# Patient Record
Sex: FEMALE | Race: WHITE | Employment: OTHER | URBAN - NONMETROPOLITAN AREA
[De-identification: names, ages, dates, MRNs, and addresses within clinical notes are randomized per-mention and may not be internally consistent; named-entity substitution may affect disease eponyms.]

---

## 2017-09-15 ENCOUNTER — HOSPITAL ENCOUNTER (INPATIENT)
Age: 77
LOS: 20 days | Discharge: HOSPICE | DRG: 441 | End: 2017-10-05
Attending: ANESTHESIOLOGY | Admitting: ANESTHESIOLOGY
Payer: MEDICARE

## 2017-09-15 ENCOUNTER — APPOINTMENT (OUTPATIENT)
Dept: GENERAL RADIOLOGY | Age: 77
DRG: 441 | End: 2017-09-15
Attending: ANESTHESIOLOGY
Payer: MEDICARE

## 2017-09-15 ENCOUNTER — APPOINTMENT (OUTPATIENT)
Dept: ULTRASOUND IMAGING | Age: 77
DRG: 441 | End: 2017-09-15
Attending: ANESTHESIOLOGY
Payer: MEDICARE

## 2017-09-15 DIAGNOSIS — E83.52 HYPERCALCEMIA: Primary | ICD-10-CM

## 2017-09-15 PROBLEM — E87.6 HYPOKALEMIA: Status: ACTIVE | Noted: 2017-09-15

## 2017-09-15 PROBLEM — D69.6 THROMBOCYTOPENIA (HCC): Status: ACTIVE | Noted: 2017-09-15

## 2017-09-15 PROBLEM — N82.1: Status: RESOLVED | Noted: 2017-09-15 | Resolved: 2017-09-15

## 2017-09-15 PROBLEM — D50.9 MICROCYTIC ANEMIA: Status: ACTIVE | Noted: 2017-09-15

## 2017-09-15 PROBLEM — J90 PLEURAL EFFUSION, RIGHT: Status: ACTIVE | Noted: 2017-09-15

## 2017-09-15 PROBLEM — E80.6 HYPERBILIRUBINEMIA: Status: ACTIVE | Noted: 2017-09-15

## 2017-09-15 PROBLEM — N82.1: Status: ACTIVE | Noted: 2017-09-15

## 2017-09-15 PROBLEM — K76.82 HEPATIC ENCEPHALOPATHY: Status: ACTIVE | Noted: 2017-09-15

## 2017-09-15 PROBLEM — I16.0 HYPERTENSIVE URGENCY: Status: ACTIVE | Noted: 2017-09-15

## 2017-09-15 LAB
ALBUMIN SERPL-MCNC: 2.2 G/DL (ref 3.5–5.1)
ALP BLD-CCNC: 197 U/L (ref 38–126)
ALT SERPL-CCNC: 15 U/L (ref 11–66)
AMMONIA: 143 UMOL/L (ref 11–60)
AMPHETAMINE+METHAMPHETAMINE URINE SCREEN: NEGATIVE
ANION GAP SERPL CALCULATED.3IONS-SCNC: 12 MEQ/L (ref 8–16)
ANISOCYTOSIS: ABNORMAL
AST SERPL-CCNC: 19 U/L (ref 5–40)
BACTERIA: ABNORMAL
BARBITURATE QUANTITATIVE URINE: NEGATIVE
BASOPHILIA: SLIGHT
BASOPHILS # BLD: 0 %
BASOPHILS ABSOLUTE: 0 THOU/MM3 (ref 0–0.1)
BENZODIAZEPINE QUANTITATIVE URINE: NEGATIVE
BILIRUB SERPL-MCNC: 2.5 MG/DL (ref 0.3–1.2)
BILIRUBIN DIRECT: 1.3 MG/DL (ref 0–0.3)
BILIRUBIN URINE: NEGATIVE
BLOOD, URINE: ABNORMAL
BUN BLDV-MCNC: 37 MG/DL (ref 7–22)
CALCIUM SERPL-MCNC: 10.3 MG/DL (ref 8.5–10.5)
CANNABINOID QUANTITATIVE URINE: NEGATIVE
CASTS: ABNORMAL /LPF
CASTS: ABNORMAL /LPF
CHARACTER, URINE: CLEAR
CHLORIDE BLD-SCNC: 105 MEQ/L (ref 98–111)
CO2: 28 MEQ/L (ref 23–33)
COCAINE METABOLITE QUANTITATIVE URINE: NEGATIVE
COLOR: YELLOW
CREAT SERPL-MCNC: 1.1 MG/DL (ref 0.4–1.2)
CRYSTALS: ABNORMAL
DIFFERENTIAL TYPE: ABNORMAL
EOSINOPHIL # BLD: 0 %
EOSINOPHILS ABSOLUTE: 0 THOU/MM3 (ref 0–0.4)
EPITHELIAL CELLS, UA: ABNORMAL /HPF
GFR SERPL CREATININE-BSD FRML MDRD: 48 ML/MIN/1.73M2
GLUCOSE BLD-MCNC: 165 MG/DL (ref 70–108)
GLUCOSE, URINE: NEGATIVE MG/DL
HCT VFR BLD CALC: 29 % (ref 37–47)
HEMOGLOBIN: 9.6 GM/DL (ref 12–16)
HYPOCHROMIA: ABNORMAL
INR BLD: 1.09 (ref 0.85–1.13)
INR BLD: 1.09 (ref 0.85–1.13)
KETONES, URINE: NEGATIVE
LACTIC ACID: 2.2 MMOL/L (ref 0.5–2.2)
LEUKOCYTE EST, POC: NEGATIVE
LV EF: 65 %
LVEF MODALITY: NORMAL
LYMPHOCYTES # BLD: 6 %
LYMPHOCYTES ABSOLUTE: 0.3 THOU/MM3 (ref 1–4.8)
MAGNESIUM: 1.8 MG/DL (ref 1.6–2.4)
MCH RBC QN AUTO: 25.8 PG (ref 27–31)
MCHC RBC AUTO-ENTMCNC: 33.1 GM/DL (ref 33–37)
MCV RBC AUTO: 77.9 FL (ref 81–99)
MICROCYTES: ABNORMAL
MISCELLANEOUS LAB TEST RESULT: ABNORMAL
MONOCYTES # BLD: 10.9 %
MONOCYTES ABSOLUTE: 0.5 THOU/MM3 (ref 0.4–1.3)
NITRITE, URINE: NEGATIVE
NUCLEATED RED BLOOD CELLS: 0 /100 WBC
OPIATES, URINE: NEGATIVE
OXYCODONE: NEGATIVE
PATHOLOGIST REVIEW: ABNORMAL
PDW BLD-RTO: 20.8 % (ref 11.5–14.5)
PH UA: 6.5
PHENCYCLIDINE QUANTITATIVE URINE: NEGATIVE
PHOSPHORUS: 2.4 MG/DL (ref 2.4–4.7)
PLATELET # BLD: 64 THOU/MM3 (ref 130–400)
PLATELET ESTIMATE: ABNORMAL
PMV BLD AUTO: 8.6 MCM (ref 7.4–10.4)
POIKILOCYTES: ABNORMAL
POTASSIUM SERPL-SCNC: 3.1 MEQ/L (ref 3.5–5.2)
PROTEIN UA: 100 MG/DL
RBC # BLD: 3.73 MILL/MM3 (ref 4.2–5.4)
RBC # BLD: ABNORMAL 10*6/UL
RBC URINE: ABNORMAL /HPF
RENAL EPITHELIAL, UA: ABNORMAL
SCAN OF BLOOD SMEAR: NORMAL
SEG NEUTROPHILS: 83.1 %
SEGMENTED NEUTROPHILS ABSOLUTE COUNT: 4.1 THOU/MM3 (ref 1.8–7.7)
SODIUM BLD-SCNC: 145 MEQ/L (ref 135–145)
SPECIFIC GRAVITY UA: 1.02 (ref 1–1.03)
TOTAL PROTEIN: 5.5 G/DL (ref 6.1–8)
UROBILINOGEN, URINE: 1 EU/DL
WBC # BLD: 4.9 THOU/MM3 (ref 4.8–10.8)
WBC UA: ABNORMAL /HPF
YEAST: ABNORMAL

## 2017-09-15 PROCEDURE — 76705 ECHO EXAM OF ABDOMEN: CPT

## 2017-09-15 PROCEDURE — 2580000003 HC RX 258: Performed by: ANESTHESIOLOGY

## 2017-09-15 PROCEDURE — 2500000003 HC RX 250 WO HCPCS: Performed by: ANESTHESIOLOGY

## 2017-09-15 PROCEDURE — 87086 URINE CULTURE/COLONY COUNT: CPT

## 2017-09-15 PROCEDURE — 99223 1ST HOSP IP/OBS HIGH 75: CPT | Performed by: ANESTHESIOLOGY

## 2017-09-15 PROCEDURE — 83605 ASSAY OF LACTIC ACID: CPT

## 2017-09-15 PROCEDURE — 2580000003 HC RX 258: Performed by: INTERNAL MEDICINE

## 2017-09-15 PROCEDURE — 83735 ASSAY OF MAGNESIUM: CPT

## 2017-09-15 PROCEDURE — 82140 ASSAY OF AMMONIA: CPT

## 2017-09-15 PROCEDURE — 99221 1ST HOSP IP/OBS SF/LOW 40: CPT | Performed by: NURSE PRACTITIONER

## 2017-09-15 PROCEDURE — 85025 COMPLETE CBC W/AUTO DIFF WBC: CPT

## 2017-09-15 PROCEDURE — 81001 URINALYSIS AUTO W/SCOPE: CPT

## 2017-09-15 PROCEDURE — 80307 DRUG TEST PRSMV CHEM ANLYZR: CPT

## 2017-09-15 PROCEDURE — 36415 COLL VENOUS BLD VENIPUNCTURE: CPT

## 2017-09-15 PROCEDURE — 71010 XR CHEST PORTABLE: CPT

## 2017-09-15 PROCEDURE — 6360000002 HC RX W HCPCS: Performed by: NURSE PRACTITIONER

## 2017-09-15 PROCEDURE — 85610 PROTHROMBIN TIME: CPT

## 2017-09-15 PROCEDURE — 2580000003 HC RX 258: Performed by: NURSE PRACTITIONER

## 2017-09-15 PROCEDURE — 51702 INSERT TEMP BLADDER CATH: CPT

## 2017-09-15 PROCEDURE — 87040 BLOOD CULTURE FOR BACTERIA: CPT

## 2017-09-15 PROCEDURE — 80053 COMPREHEN METABOLIC PANEL: CPT

## 2017-09-15 PROCEDURE — 93306 TTE W/DOPPLER COMPLETE: CPT

## 2017-09-15 PROCEDURE — 82248 BILIRUBIN DIRECT: CPT

## 2017-09-15 PROCEDURE — C9113 INJ PANTOPRAZOLE SODIUM, VIA: HCPCS | Performed by: NURSE PRACTITIONER

## 2017-09-15 PROCEDURE — 6370000000 HC RX 637 (ALT 250 FOR IP): Performed by: NURSE PRACTITIONER

## 2017-09-15 PROCEDURE — 6360000002 HC RX W HCPCS: Performed by: ANESTHESIOLOGY

## 2017-09-15 PROCEDURE — 2500000003 HC RX 250 WO HCPCS: Performed by: INTERNAL MEDICINE

## 2017-09-15 PROCEDURE — 84100 ASSAY OF PHOSPHORUS: CPT

## 2017-09-15 PROCEDURE — 2140000000 HC CCU INTERMEDIATE R&B

## 2017-09-15 PROCEDURE — 99233 SBSQ HOSP IP/OBS HIGH 50: CPT | Performed by: INTERNAL MEDICINE

## 2017-09-15 PROCEDURE — 6360000002 HC RX W HCPCS: Performed by: INTERNAL MEDICINE

## 2017-09-15 RX ORDER — POTASSIUM CHLORIDE 7.45 MG/ML
10 INJECTION INTRAVENOUS
Status: DISPENSED | OUTPATIENT
Start: 2017-09-15 | End: 2017-09-15

## 2017-09-15 RX ORDER — LABETALOL HYDROCHLORIDE 5 MG/ML
10 INJECTION, SOLUTION INTRAVENOUS EVERY 4 HOURS PRN
Status: DISCONTINUED | OUTPATIENT
Start: 2017-09-15 | End: 2017-10-05 | Stop reason: HOSPADM

## 2017-09-15 RX ORDER — OMEPRAZOLE 20 MG/1
40 CAPSULE, DELAYED RELEASE ORAL DAILY
Status: ON HOLD | COMMUNITY
End: 2017-10-05 | Stop reason: HOSPADM

## 2017-09-15 RX ORDER — LACTULOSE 10 G/15ML
20 SOLUTION ORAL 3 TIMES DAILY
Status: DISCONTINUED | OUTPATIENT
Start: 2017-09-15 | End: 2017-10-01

## 2017-09-15 RX ORDER — SODIUM CHLORIDE 0.9 % (FLUSH) 0.9 %
10 SYRINGE (ML) INJECTION PRN
Status: DISCONTINUED | OUTPATIENT
Start: 2017-09-15 | End: 2017-10-05 | Stop reason: HOSPADM

## 2017-09-15 RX ORDER — LEVOTHYROXINE SODIUM 112 UG/1
112 TABLET ORAL
Status: DISCONTINUED | OUTPATIENT
Start: 2017-09-15 | End: 2017-09-15

## 2017-09-15 RX ORDER — FUROSEMIDE 10 MG/ML
20 INJECTION INTRAMUSCULAR; INTRAVENOUS 2 TIMES DAILY
Status: DISCONTINUED | OUTPATIENT
Start: 2017-09-15 | End: 2017-09-15

## 2017-09-15 RX ORDER — LEVOTHYROXINE SODIUM 112 UG/1
112 TABLET ORAL DAILY
Status: ON HOLD | COMMUNITY
End: 2017-10-05 | Stop reason: HOSPADM

## 2017-09-15 RX ORDER — SODIUM CHLORIDE 0.9 % (FLUSH) 0.9 %
10 SYRINGE (ML) INJECTION EVERY 12 HOURS SCHEDULED
Status: DISCONTINUED | OUTPATIENT
Start: 2017-09-15 | End: 2017-09-15 | Stop reason: SDUPTHER

## 2017-09-15 RX ORDER — METRONIDAZOLE 250 MG/1
250 TABLET ORAL EVERY 12 HOURS SCHEDULED
Status: DISCONTINUED | OUTPATIENT
Start: 2017-09-15 | End: 2017-09-16

## 2017-09-15 RX ORDER — ACETAMINOPHEN 325 MG/1
650 TABLET ORAL EVERY 4 HOURS PRN
Status: DISCONTINUED | OUTPATIENT
Start: 2017-09-15 | End: 2017-10-05 | Stop reason: HOSPADM

## 2017-09-15 RX ORDER — NICOTINE 21 MG/24HR
1 PATCH, TRANSDERMAL 24 HOURS TRANSDERMAL DAILY
Status: DISCONTINUED | OUTPATIENT
Start: 2017-09-15 | End: 2017-10-05 | Stop reason: HOSPADM

## 2017-09-15 RX ORDER — SODIUM CHLORIDE 0.9 % (FLUSH) 0.9 %
10 SYRINGE (ML) INJECTION PRN
Status: DISCONTINUED | OUTPATIENT
Start: 2017-09-15 | End: 2017-09-15 | Stop reason: SDUPTHER

## 2017-09-15 RX ORDER — SODIUM CHLORIDE 0.9 % (FLUSH) 0.9 %
10 SYRINGE (ML) INJECTION EVERY 12 HOURS SCHEDULED
Status: DISCONTINUED | OUTPATIENT
Start: 2017-09-15 | End: 2017-10-05 | Stop reason: HOSPADM

## 2017-09-15 RX ORDER — URSODIOL 500 MG/1
500 TABLET, FILM COATED ORAL 2 TIMES DAILY
Status: DISCONTINUED | OUTPATIENT
Start: 2017-09-15 | End: 2017-09-19 | Stop reason: DRUGHIGH

## 2017-09-15 RX ORDER — LISINOPRIL 20 MG/1
20 TABLET ORAL DAILY
Status: DISCONTINUED | OUTPATIENT
Start: 2017-09-15 | End: 2017-09-15

## 2017-09-15 RX ORDER — PANTOPRAZOLE SODIUM 40 MG/1
40 TABLET, DELAYED RELEASE ORAL
Status: DISCONTINUED | OUTPATIENT
Start: 2017-09-15 | End: 2017-09-15

## 2017-09-15 RX ORDER — 0.9 % SODIUM CHLORIDE 0.9 %
5 VIAL (ML) INJECTION DAILY
Status: DISCONTINUED | OUTPATIENT
Start: 2017-09-15 | End: 2017-09-19

## 2017-09-15 RX ORDER — POTASSIUM CHLORIDE 7.45 MG/ML
10 INJECTION INTRAVENOUS PRN
Status: DISCONTINUED | OUTPATIENT
Start: 2017-09-15 | End: 2017-10-05 | Stop reason: HOSPADM

## 2017-09-15 RX ORDER — ONDANSETRON 2 MG/ML
4 INJECTION INTRAMUSCULAR; INTRAVENOUS EVERY 6 HOURS PRN
Status: DISCONTINUED | OUTPATIENT
Start: 2017-09-15 | End: 2017-10-05 | Stop reason: HOSPADM

## 2017-09-15 RX ORDER — FERROUS SULFATE 325(65) MG
325 TABLET ORAL
Status: DISCONTINUED | OUTPATIENT
Start: 2017-09-15 | End: 2017-09-15

## 2017-09-15 RX ORDER — LACTULOSE 10 G/15ML
30 SOLUTION ORAL 3 TIMES DAILY
Status: DISCONTINUED | OUTPATIENT
Start: 2017-09-15 | End: 2017-09-15

## 2017-09-15 RX ORDER — PANTOPRAZOLE SODIUM 40 MG/10ML
40 INJECTION, POWDER, LYOPHILIZED, FOR SOLUTION INTRAVENOUS DAILY
Status: DISCONTINUED | OUTPATIENT
Start: 2017-09-15 | End: 2017-09-15

## 2017-09-15 RX ORDER — PANTOPRAZOLE SODIUM 40 MG/10ML
40 INJECTION, POWDER, LYOPHILIZED, FOR SOLUTION INTRAVENOUS 2 TIMES DAILY
Status: DISCONTINUED | OUTPATIENT
Start: 2017-09-15 | End: 2017-09-18

## 2017-09-15 RX ORDER — LEVOTHYROXINE SODIUM ANHYDROUS 100 UG/5ML
62.5 INJECTION, POWDER, LYOPHILIZED, FOR SOLUTION INTRAVENOUS DAILY
Status: DISCONTINUED | OUTPATIENT
Start: 2017-09-15 | End: 2017-09-19

## 2017-09-15 RX ORDER — SODIUM CHLORIDE 9 MG/ML
INJECTION, SOLUTION INTRAVENOUS CONTINUOUS
Status: DISCONTINUED | OUTPATIENT
Start: 2017-09-15 | End: 2017-09-16

## 2017-09-15 RX ADMIN — POTASSIUM CHLORIDE 10 MEQ: 7.46 INJECTION, SOLUTION INTRAVENOUS at 09:37

## 2017-09-15 RX ADMIN — POTASSIUM CHLORIDE 10 MEQ: 7.46 INJECTION, SOLUTION INTRAVENOUS at 20:33

## 2017-09-15 RX ADMIN — SODIUM CHLORIDE: 9 INJECTION, SOLUTION INTRAVENOUS at 18:43

## 2017-09-15 RX ADMIN — LACTULOSE 20 G: 20 SOLUTION ORAL at 22:08

## 2017-09-15 RX ADMIN — POTASSIUM CHLORIDE 10 MEQ: 7.46 INJECTION, SOLUTION INTRAVENOUS at 08:33

## 2017-09-15 RX ADMIN — ENOXAPARIN SODIUM 40 MG: 40 INJECTION SUBCUTANEOUS at 18:46

## 2017-09-15 RX ADMIN — LABETALOL HYDROCHLORIDE 1 MG/MIN: 5 INJECTION, SOLUTION INTRAVENOUS at 17:21

## 2017-09-15 RX ADMIN — POTASSIUM CHLORIDE 10 MEQ: 7.46 INJECTION, SOLUTION INTRAVENOUS at 14:27

## 2017-09-15 RX ADMIN — LEVOTHYROXINE SODIUM ANHYDROUS 62.5 MCG: 100 INJECTION, POWDER, LYOPHILIZED, FOR SOLUTION INTRAVENOUS at 17:24

## 2017-09-15 RX ADMIN — METRONIDAZOLE 250 MG: 250 TABLET ORAL at 22:09

## 2017-09-15 RX ADMIN — SODIUM CHLORIDE: 9 INJECTION, SOLUTION INTRAVENOUS at 07:19

## 2017-09-15 RX ADMIN — Medication 10 ML: at 22:03

## 2017-09-15 RX ADMIN — PANTOPRAZOLE SODIUM 40 MG: 40 INJECTION, POWDER, FOR SOLUTION INTRAVENOUS at 22:01

## 2017-09-15 RX ADMIN — Medication 5 ML: at 17:24

## 2017-09-15 RX ADMIN — POTASSIUM CHLORIDE 10 MEQ: 7.46 INJECTION, SOLUTION INTRAVENOUS at 11:52

## 2017-09-15 RX ADMIN — POTASSIUM CHLORIDE 10 MEQ: 7.46 INJECTION, SOLUTION INTRAVENOUS at 19:31

## 2017-09-15 RX ADMIN — FUROSEMIDE 20 MG: 10 INJECTION, SOLUTION INTRAMUSCULAR; INTRAVENOUS at 11:52

## 2017-09-15 RX ADMIN — LABETALOL HYDROCHLORIDE 10 MG: 5 INJECTION, SOLUTION INTRAVENOUS at 09:37

## 2017-09-15 RX ADMIN — LABETALOL HYDROCHLORIDE 2 MG/MIN: 5 INJECTION, SOLUTION INTRAVENOUS at 12:05

## 2017-09-15 NOTE — PROGRESS NOTES
0730- Dr. Cody Velasquez to bedside and placed huerta catheter. 700mL of yellow urine out immediately after placement. 65- Dr. Rudy Carlin at bedside and updated on patient inability to take anything PO and elevated BP. Orders received for lactulose enema and IV medications for blood pressure including IV Labetalol. 200- [de-identified] [place],CNP with [facility] states to hold the enema for now and she would be placing orders for possible NG tube. 1445- Patient to ultrasound for possible paracentesis. Tech accompanied patient due to her not following commands. 1640- Patient back into room. Unable to complete paracentesis due to not enough fluid. 1715- NG tube placed to right nare after multiple attempts. Patient uncooperative during placement. 2 RNs present and had to hold arms in order to place. 1835- Chest XRAY states tube needs to be advanced 7cm. NG tube advanced 7cm. XRAY to come verify placement. 1920- XRAY verified good placement of NG tube.      1945- Report off given to Ching Garay RN at bedside

## 2017-09-15 NOTE — PROGRESS NOTES
Nutrition Assessment    Type and Reason for Visit: Initial, Positive Nutrition Screen (Difficulty swallowing pills and meds)    Nutrition Recommendations:Consider swallow eval to insure swallowing safety as appropriate     Malnutrition Assessment:  · Malnutrition Status: At risk for malnutrition    Nutrition Diagnosis:   · Problem: Inadequate oral intake  · Etiology: related to Insufficient energy/nutrient consumption     Signs and symptoms:  as evidenced by NPO status due to medical condition    Nutrition Assessment:  · Subjective Assessment: Pt. a new admit. Pt. doesn't respond to diet questios &. appears to be sleeping.  noted pt. was moaning in her notes. Labs include: K+ 3.1, Glu 165, , Hemoglobin 9.6. · Wound Type: None  · Current Nutrition Therapies:  · Oral Diet Orders: NPO   · Oral Diet intake: NPO  · Anthropometric Measures:  · Ht: 5' 1\" (154.9 cm)   · Current Body Wt: 138 lb 10.7 oz (62.9 kg)  · Admission Body Wt: 138 lb 10.7 oz (62.9 kg) ((9/15))  · Usual Body Wt: 131 lb (59.4 kg) ((2/16/17) per hospital records)  · Ideal Body Wt: 105 lb (47.6 kg), % Ideal Body 131%  · Adjusted Body Wt: 113 lb (51.3 kg), body weight adjusted for    · BMI Classification: BMI 25.0 - 29.9 Overweight  · Comparative Standards (Estimated Nutrition Needs):  · Estimated Daily Total Kcal: 9241-4877  · Estimated Daily Protein (g): 51-61 grams    Estimated Intake vs Estimated Needs: Intake Less Than Needs    Nutrition Risk Level: High    Nutrition Interventions:   Continue NPO  Continued Inpatient Monitoring, Swallow Evaluation    Nutrition Evaluation:   · Evaluation: Goals set   · Goals: Pt. will meet nutritional needs within 1-4 days. · Monitoring: NPO Status, Mental Status/Confusion, Weight, Comparative Standards, Pertinent Labs, Chewing/Swallowing    See Adult Nutrition Doc Flowsheet for more detail.      Electronically signed by Christina Carmichael RD, LD on 9/15/17 at 1:37 PM    Contact Number: [phone]

## 2017-09-15 NOTE — IP AVS SNAPSHOT
After Visit Summary  (Discharge Instructions)    Medication List for Home    Based on the information you provided to us as well as any changes during this visit, the following is your updated medication list.  Compare this with your prescription bottles at home. If you have any questions or concerns, contact your primary care physician's office. Daily Medication List (This medication list can be shared with any healthcare provider who is helping you manage your medications)      There are NEW medications for you. START taking them after you leave the hospital        Last Dose    Next Dose Due AM NOON PM NIGHT    LORazepam 2 MG/ML concentrated solution   Commonly known as:  ATIVAN   Take 0.25 mLs by mouth every hour as needed (agitation or anxiety)                                         morphine 20 MG/5ML solution   Take 0.63 mLs by mouth every 4 hours as needed for Pain . These are the medications you have told us you were taking at home, STOP taking them after you leave the hospital     FEOSOL 325 (65 Fe) MG tablet   Generic drug:  ferrous sulfate       levothyroxine 100 MCG tablet   Commonly known as:  SYNTHROID       levothyroxine 112 MCG tablet   Commonly known as:  SYNTHROID       levothyroxine 50 MCG tablet   Commonly known as:  SYNTHROID       NYSTATIN PO       omeprazole 20 MG delayed release capsule   Commonly known as:  PRILOSEC       omeprazole 20 MG tablet   Commonly known as:  PRILOSEC OTC       ursodiol 500 MG tablet   Commonly known as:  ACTIGALL            Where to Get Your Medications      Information about where to get these medications is not yet available     !  Ask your nurse or doctor about these medications     LORazepam 2 MG/ML concentrated solution    morphine 20 MG/5ML solution               Allergies as of 10/5/2017     No Known Allergies      Immunizations as of 10/5/2017     Name Date Dose VIS Date Route Contact information:    [address] [facility]  [phone]        Follow up with [facility] . Specialties:  [place], Rehabilitation    Contact information:    [address]  [place] [address] [address] [phone]      Future Appointments     2017     Imaging:  NM PARATHYROID W SPECT/CT          Preventive Care        Date Due    Tetanus Combination Vaccine (1 - Tdap) 1959    Zoster Vaccine 2000    Osteoporosis screening or a bone density scan (Dexa) is recommended once at age 72. Based upon the results and risk factors for bone loss, your provider will recommend whether this needs to be repeated. 2005    Pneumococcal Vaccines (two) for all adults aged 72 and over (2 of 2 - PCV13) 2015    Yearly Flu Vaccine (1) 2017    Cholesterol Screening 2022                 Care Plan Once You Return Home    This section includes instructions you will need to follow once you leave the hospital.  Your care team will discuss these with you, so you and those caring for you know how to best care for your health needs at home. This section may also include educational information about certain health topics that may be of help to you. Discharge [address] [place] [address] Form    Patient Name: Rafiq Lyon   :  1940  MRN:  [id number]  Admit date:  9/15/2017  Discharge date:  10/5/2017    Code Status Order: [facility]  Advance Directives:  Yes    Admitting Physician:  Rolando Vazquez MD  PCP: Jackson Haque    Discharging Nurse: AUGUSTIN FLORES [facility] Unit/Room#: 5K-04/004-A  Discharging Unit Phone Number: [phone]    Emergency Contact:   Contact 1: Name: Kyle Chapa  Contact 1: Number: [phone]  Contact 1: Relationship: daughter    Past Surgical History:  Past Surgical History:   Procedure Laterality Date    CATARACT REMOVAL      jolene     COLON SURGERY  2002 resection Dr. Little Parents  2012    Dr. Danielle Espitia  as a child   [address] [employer] [address] TUMOR REMOVAL  2013    of stomach 2013 Dr. Hilton Dickson  3 2013    Dr. Dale Rodriguez       Immunization History:   Immunization History   Administered Date(s) Administered    Influenza Virus Vaccine 09/01/2012, 09/01/2013, 10/14/2015    PPD Test 09/04/2013    Pneumococcal Polysaccharide ([id number]) 04/12/2014       Active Problems:  Patient Active Problem List   Diagnosis    GIST (gastrointestinal stromal tumor), non-malignant    Essential hypertension    [address]rhosis of liver with ascites ([address]    Cancer ([address]    Hypothyroidism    Dizzy spells    Fever    Epigastric pain    History of atrial fibrillation    [facility] cirrhosis [facility]    Hypertensive urgency    Urethrovaginal fistula    Pleural effusion    Acute hepatic encephalopathy    Thrombocytopenia (HCC)    Microcytic anemia    Hyperbilirubinemia    Hypokalemia    Urinary retention    Hypercalcemia, not POA    Acute renal failure with tubular necrosis (HCC)    Pneumonia, organism unspecified    BEN (acute kidney injury) ([facility]    Dehydration    Acquired hypothyroidism    Hypovitaminosis D    Hepatic encephalopathy (HCC)    Hypothermia, not POA    Sludge in gallbladder    Hyperparathyroidism ([address]    Bilateral leg edema    Metabolic acidosis    Severe malnutrition (HCC)       Isolation/Infection:   Isolation     No Isolation            Nurse Assessment:  Last Vital Signs: BP (!) 146/63  Pulse 74  Temp 98.2 °F (36.8 °C) (Oral)   Resp 16  Ht 5' 1\" (1.549 m)  Wt 164 lb 9.3 oz (74.7 kg)  SpO2 94%  BMI 31.1 kg/m2    Last documented pain score (0-10 scale): Pain Level: 0  Last Weight:   Wt Readings from Last 1 Encounters:   09/23/17 164 lb 9.3 oz (74.7 kg)     Mental Status:  disoriented     IV Access:  - None    Nursing Mobility/ADLs:  Walking   Dependent  Transfer  Dependent Emma Menon  is necessary for the continuing treatment of the diagnosis listed and that she requires Hospice for less 30 days. Update Admission H&P: No change in H&P    PHYSICIAN SIGNATURE:  Electronically signed by Vidhi Al MD on 10/5/17 at 1:10 PM    CASE MANAGEMENT/SOCIAL WORK SECTION    Inpatient Status Date: 9/15/17    [facility] Readmission Risk Assessment Score:  Risk Score: 17.5   (Score > 14= high risk for readmission)    Discharging to Facility/ Agency   · Name: [facility]  · Address: [address] [place] [address], [facility] [address]  · Phone: [phone]  · Fax: [phone]    Dialysis Facility (if applicable)   · Name:  · Address:  · Dialysis Schedule:  · Phone:  · Fax:    / signature: Electronically signed by HOLLY Coates on 10/2/17 at 9:25 AM      F/up dr Adiel Urbano 2-4 weeks    Important information for a smoker       SMOKING: QUIT SMOKING. THIS IS THE MOST IMPORTANT ACTION YOU CAN TAKE TO IMPROVE YOUR CURRENT AND FUTURE HEALTH. Call the [address] [place] [address] at [place] NOW ([phone])    Smoking harms nonsmokers. When nonsmokers are around people who smoke, they absorb nicotine, carbon monoxide, and other ingredients of tobacco smoke. DO NOT SMOKE AROUND CHILDREN     Children exposed to secondhand smoke are at an increased risk of:  Sudden Infant Death Syndrome (SIDS), acute respiratory infections, inflammation of the middle ear, and severe asthma. Over a longer time, it causes heart disease and lung cancer. There is no safe level of exposure to secondhand smoke. Contactualhart Signup     Zannel allows you to send messages to your doctor, view your test results, renew your prescriptions, schedule appointments, view visit notes, and more. How Do I Sign Up? 1. In your Internet browser, go to [email or web address]  2. Click on the Sign Up Now link in the Sign In box.  You will see the New Member Sign Up page. 3. Enter your MOWGLI Access Code exactly as it appears below. You will not need to use this code after youve completed the sign-up process. If you do not sign up before the expiration date, you must request a new code. MOWGLI Access Code: [email or web address]  Expires: 11/28/2017 11:05 AM    4. Enter your Social Security Number ([id number]) and Date of Birth (mm/dd/yyyy) as indicated and click Submit. You will be taken to the next sign-up page. 5. Create a MOWGLI ID. This will be your MOWGLI login ID and cannot be changed, so think of one that is secure and easy to remember. 6. Create a MOWGLI password. You can change your password at any time. 7. Enter your Password Reset Question and Answer. This can be used at a later time if you forget your password. 8. Enter your e-mail address. You will receive e-mail notification when new information is available in [address] 9. Click Sign Up. You can now view your medical record. Additional Information  If you have questions, please contact the physician practice where you receive care. Remember, MOWGLI is NOT to be used for urgent needs. For medical emergencies, dial 911. For questions regarding your MOWGLI account call [phone]. If you have a clinical question, please call your doctor's office. View your information online  ? Review your current list of  medications, immunization, and allergies. ? Review your future test results online . ? Review your discharge instructions provided by your caregivers at discharge    Certain functionality such as prescription refills, scheduling appointments or sending messages to your provider are not activated if your provider does not use [employer] in his/her office    For questions regarding your Greetzt account call [phone]. If you have a clinical question, please call your doctor's office.          The information on all pages of the After Visit Summary has been reviewed

## 2017-09-15 NOTE — IP AVS SNAPSHOT
Patient Information     Patient Name HOLLIE Sampson 1940      Important Information for Stroke      If you have a current diagnosis or history of any of the following, please review the information carefully. STROKE PATIENTS:  If you notice any sign or symptom that indicates that you may be having a stroke, activate the emergency medical services immediately by calling 9-1-1. These symptoms include: uneven facial expression, arm(s) drifting down, strange speech or loss of speech, vision problems, sudden severe headache, sudden numbness or face/arms/legs, sudden confusion or difficult understanding, sudden dizziness, sudden difficulty with walking. Continue or begin taking the medications prescribed by your physician as listed above. There are personal risk that are associated with [place]. Anyone can have a stroke no matter your age, race or gender. The chances of having a stroke increase if you have certain risk factors. The best way to protect yourself and loved ones from stroke is to understand your personal risk and how to manage it. There are 2 types of risk factors for stroke: uncontrollable and controllable. Uncontrollable risk factors include being over age 54, being male, being ,  or /, or having a personal or family history of a stroke or transient ischemic attack (TIA). Controllable risk factors generally fall into two categories: lifestyle risk factors or medical risk factors. Lifestyle risk factors can often be changed, while medical risk factors can usually be treated. Both types can be managed best by working with a doctor, who can prescribe medications and advise on how to adopt a healthy lifestyle. Controllable risk factors include high blood pressure, atrial fibrillation, high cholesterol, diabetes, atherosclerosis, circulation problems, tobacco use or smoking, alcohol use, lack of exercise, and obesity. ___  Statin prescribed  or ___ Not applicable

## 2017-09-15 NOTE — IP AVS SNAPSHOT
Patient Information     Patient Name HOLLIE Smith 1940         This is your updated medication list to keep with you all times      TAKE these medications     LORazepam 2 MG/ML concentrated solution   Commonly known as:  ATIVAN   Take 0.25 mLs by mouth every hour as needed (agitation or anxiety)       morphine 20 MG/5ML solution   Take 0.63 mLs by mouth every 4 hours as needed for Pain .

## 2017-09-15 NOTE — CONSULTS
Urology Consult Note      Reason for Consult:  Dr. Abby Hodges  Requesting Physician:  Huerta placement    History Obtained From:  Daughter, nurse. Chief Complaint:  lethargy, weakness, ams    HISTORY OF PRESENT ILLNESS:                The patient is a 68 y.o. female who presented to outside hospital secondary to altered mental status, lethargy, and weakness. Reportedly attempts at catheter placement at outside hospital were unsuccessful. Dr. Uday Londono placed huerta catheter at bedside this morning. Huerta is currently draining clear yellow urine. Pt is lethargic and unable to provide history. Daughter is present at bedside and reports pt had a change in her mental status and just wanted to lay in her bed for 2 days. As such they presented for evaluation. Daughter denies any history of urinary retention. Daughter denies any knowledge of urinary incontinence, frequency, urgency, gross hematuria. Pt has a hx of primary biliary cirrhosis, GAVE, GIST, HTN, murmur, anemia. Nursing reports there are plans for abdominal US with possible paracentesis and pt is currently being treated for hepatic encephalopathy. Past Medical History:        Diagnosis Date    Anemia     Arthritis     Blood transfusion reaction     Cancer ([address]     colon in polyps    Cirrhosis ([address]     biliary    Heart murmur     Hernia     Hypertension     Thyroid disease     Unspecified diseases of blood and blood-forming organs     anemia     Past Surgical History:        Procedure Laterality Date    CATARACT REMOVAL  1995    jolene     COLON SURGERY  July 2002    resection Dr. Little Parents  2012    Dr. Danielle Espitia  as a child   [address] [address] TUMOR REMOVAL  2013    of stomach 2013 Dr. Hilton Dickson  3 2013    Dr. [place] Marital status:       Spouse name: N/A    Number of children: N/A    Years of education: N/A     Occupational History    retired      Social History Main Topics    Smoking status: Current Some Day Smoker     Packs/day: 0.25     Years: 42.00     Types: Cigarettes    Smokeless tobacco: Never Used    Alcohol use No    Drug use: No    Sexual activity: No     Other Topics Concern    Not on file     Social History Narrative       AL  Family History   Problem Relation Age of Onset    Heart Disease Mother     Diabetes Mother     Arthritis Mother     High Blood Pressure Mother     Prostate Cancer Father     Arthritis Father     High Blood Pressure Father     Diabetes Sister     Cancer Sister      pancreatic    Heart Disease Maternal Grandfather     Cancer Sister      pancreatic    Heart Disease Sister     Diabetes Sister     High Blood Pressure Sister        Allergies:  No Known Allergies    ROS:  Unobtainable at current time secondary to pt's mental status. PHYSICAL EXAM:  VITALS:  BP (!) 143/64  Pulse 68  Temp 98.9 °F (37.2 °C) (Axillary)   Resp 18  Ht 5' 1\" (1.549 m)  Wt 138 lb 9.6 oz (62.9 kg)  SpO2 92%  BMI 26.19 kg/m2. Nursing note and vitals reviewed. Constitutional:    Pt lethargic. Moves extremities to stimulation  HEENT:   Head:         Normocephalic and atraumatic. Mouth/Throat:          Mucous membranes are normal.   Eyes:         EOM are normal. No scleral icterus. Nose:    The external appearance of the nose is normal  Ears: The ears appear normal to external inspection   Neck:         Supple, symmetrical, trachea midline, no adenopathy, thyroid symmetric, not enlarged and no tenderness. Cardiovascular:        Normal rate, regular rhythm, S1 S2 heart sounds. Pulmonary/Chest:       Lung sounds diminished. Abdominal:          Soft. No tenderness. Hypoactive bowel sounds. Musculoskeletal:    Normal range of motion. She exhibits no edema or tenderness of lower extremities.     Extremities:    No cyanosis, clubbing, or edema weeks from d/c.      Thank you for including us in the care of [address] [place] [address]  9/15/2017 3:04 PM   VICKY HALEY II.[facility]

## 2017-09-15 NOTE — PROGRESS NOTES
Hospital Medicine Progress Note     Date:  9/15/2017    PCP: Jackson Haque (Tel: [phone])    Date of Admission: 9/15/2017      Brief hospital course: Patient with history of CASTANON and cirrhosis, transferred with report of altered mental status, uncontrolled hypertension with SBP in the 200s. She has history of atrial fibrillation, not on anticoagulation due to thrombocytopenia. She was accepted on transfer, seen by Dr. Luz Maria Pisano on 9/15 (per discussion with nursing staff) - please refer to Dr. Da Lira H&P for further details. Assessment:  Principal Problem:    Hepatic encephalopathy (HCC)  Active Problems:    Hypertensive urgency    Pleural effusion, right    Cirrhosis of liver with ascites (HCC)    Essential hypertension    Hypothyroidism    History of atrial fibrillation    Thrombocytopenia (HCC)    Microcytic anemia    Hyperbilirubinemia    Hypokalemia        Plan:  1. Hepatic encephalopathy. Ammonia 140s on admission. Lactulose as ordered. GI consulted. 2. Abdominal ascites, right pleural effusion likely hepatic hydrothorax. Will obtain abdominal US, paracentesis and fluid count. In the mean time, will place on rocephin, IV lasix (unable to tolerate PO at this time, due to confusion), and monitor. On lactulose, rifaxamin. 3. Right pleural effusion, likely secondary to hepatic hydrothorax. Will monitor with diuretics. May consider CT-chest, thoracentesis if needed. 4. Accelerated hypertension. Labetalol infusion with hold parameters - discussed with nursing staff. 5. Hx of afib, not on anticoagulation per chronic thrombocytopenia and bleeding risk. 6. Hypothyroidism. Continue IV synthroid as ordered. 7. Hypokalemia, replaced per protocol. 8. Palliative care has been consulted. Diet: Diet NPO Effective Now    Code status: Full Code     ----------  Subjective  Confused - not endorsing any symptoms.     Objective  Physical exam:  Vitals: /65  Pulse 63  Temp 98.9 °F (37.2 °C) (Axillary)   Resp 18  Ht 5' 1\" (1.549 m)  Wt 138 lb 9.6 oz (62.9 kg)  SpO2 92%  BMI 26.19 kg/m2  Gen: Not in distress. Alert. Oriented to name only  Head: Normocephalic. Atraumatic. Eyes: EOMI. Good acuity. ENT: Oral mucosa dry  Neck: No JVD. No obvious thyromegaly. CVS: Nml S1S2, no MRG, RRR  Pulmomary: Clear bilaterally. No crackles. No wheezes. Gastrointestinal: Slight distention with mild tenderness to palpation. Soft, positive bowel sounds. Musculoskeletal: No edema. Warm  Neuro: No focal deficit. Moves extremity spontaneously. Psychiatry: Appropriate affect. Not agitated. Skin: Warm, dry with normal turgor.  No rash    24HR INTAKE/OUTPUT:  No intake or output data in the 24 hours ending 09/15/17 1336          Meds:    ursodiol  500 mg Oral BID    enoxaparin  40 mg Subcutaneous Q24H    nicotine  1 patch Transdermal Daily    phosphorus replacement protocol   Other RX Placeholder    potassium chloride  10 mEq Intravenous Q2H    furosemide  20 mg Intravenous BID    lactulose enema   Rectal Daily    sodium chloride flush  10 mL Intravenous 2 times per day    cefTRIAXone (ROCEPHIN) IV  2 g Intravenous Q24H    levothyroxine  62.5 mcg Intravenous Daily    And    sodium chloride (PF)  5 mL Intravenous Daily    pantoprazole  40 mg Intravenous Daily    rifaximin  550 mg Oral BID       Infusions:    sodium chloride 50 mL/hr at 09/15/17 0941    labetalol 1.5 mg/min (09/15/17 1310)       PRN Meds: acetaminophen, magnesium hydroxide, ondansetron, potassium chloride, magnesium sulfate, labetalol, sodium chloride flush    Labs/imaging:  CBC:   Recent Labs      09/15/17   0630   WBC  4.9   HGB  9.6*   PLT  64*         BMP:    Recent Labs      09/15/17   0630   NA  145   K  3.1*   CL  105   CO2  28   BUN  37*   CREATININE  1.1   GLUCOSE  165*         Hepatic:   Recent Labs      09/15/17   0630   AST  19   ALT  15   BILITOT  2.5*   ALKPHOS  197*     INR:   Recent Labs      09/15/17   0630  09/15/17 1117   INR  1.09  1.09     Reviewed imaging and reports noted    Diet: Diet NPO Effective Now    Angel Luis Morelos MD  -------------------------------  Rounding hospitalist

## 2017-09-15 NOTE — PROGRESS NOTES
Returned to start patients IV and patient still getting ECHO at this time. Informed primary nurse to call when she is done.

## 2017-09-16 PROBLEM — E83.52 HYPERCALCEMIA: Status: ACTIVE | Noted: 2017-09-16

## 2017-09-16 LAB
ALBUMIN SERPL-MCNC: 2.2 G/DL (ref 3.5–5.1)
ALP BLD-CCNC: 180 U/L (ref 38–126)
ALT SERPL-CCNC: 14 U/L (ref 11–66)
AMMONIA: 130 UMOL/L (ref 11–60)
ANION GAP SERPL CALCULATED.3IONS-SCNC: 12 MEQ/L (ref 8–16)
ANISOCYTOSIS: ABNORMAL
AST SERPL-CCNC: 15 U/L (ref 5–40)
BASOPHILS # BLD: 0.1 %
BASOPHILS ABSOLUTE: 0 THOU/MM3 (ref 0–0.1)
BILIRUB SERPL-MCNC: 2.2 MG/DL (ref 0.3–1.2)
BUN BLDV-MCNC: 43 MG/DL (ref 7–22)
CALCIUM SERPL-MCNC: 10.6 MG/DL (ref 8.5–10.5)
CHLORIDE BLD-SCNC: 109 MEQ/L (ref 98–111)
CO2: 26 MEQ/L (ref 23–33)
CREAT SERPL-MCNC: 1.3 MG/DL (ref 0.4–1.2)
EOSINOPHIL # BLD: 0 %
EOSINOPHILS ABSOLUTE: 0 THOU/MM3 (ref 0–0.4)
FERRITIN: 38 NG/ML (ref 10–291)
GFR SERPL CREATININE-BSD FRML MDRD: 40 ML/MIN/1.73M2
GLUCOSE BLD-MCNC: 155 MG/DL (ref 70–108)
HCT VFR BLD CALC: 28.7 % (ref 37–47)
HEMOGLOBIN: 9.2 GM/DL (ref 12–16)
HYPOCHROMIA: ABNORMAL
IRON SATURATION: 12 % (ref 20–50)
IRON: 24 UG/DL (ref 50–170)
LYMPHOCYTES # BLD: 6.7 %
LYMPHOCYTES ABSOLUTE: 0.3 THOU/MM3 (ref 1–4.8)
MAGNESIUM: 1.8 MG/DL (ref 1.6–2.4)
MCH RBC QN AUTO: 25.1 PG (ref 27–31)
MCHC RBC AUTO-ENTMCNC: 32.2 GM/DL (ref 33–37)
MCV RBC AUTO: 78 FL (ref 81–99)
MICROCYTES: ABNORMAL
MONOCYTES # BLD: 9.1 %
MONOCYTES ABSOLUTE: 0.4 THOU/MM3 (ref 0.4–1.3)
NUCLEATED RED BLOOD CELLS: 0 /100 WBC
PDW BLD-RTO: 21.9 % (ref 11.5–14.5)
PHOSPHORUS: 2.9 MG/DL (ref 2.4–4.7)
PLATELET # BLD: 60 THOU/MM3 (ref 130–400)
PLATELET ESTIMATE: ABNORMAL
PMV BLD AUTO: 9.1 MCM (ref 7.4–10.4)
POTASSIUM SERPL-SCNC: 3.6 MEQ/L (ref 3.5–5.2)
POTASSIUM SERPL-SCNC: 3.9 MEQ/L (ref 3.5–5.2)
RBC # BLD: 3.69 MILL/MM3 (ref 4.2–5.4)
RBC # BLD: NORMAL 10*6/UL
SEG NEUTROPHILS: 84.1 %
SEGMENTED NEUTROPHILS ABSOLUTE COUNT: 3.6 THOU/MM3 (ref 1.8–7.7)
SODIUM BLD-SCNC: 147 MEQ/L (ref 135–145)
TOTAL IRON BINDING CAPACITY: 193 UG/DL (ref 171–450)
TOTAL PROTEIN: 5.3 G/DL (ref 6.1–8)
WBC # BLD: 4.3 THOU/MM3 (ref 4.8–10.8)

## 2017-09-16 PROCEDURE — 83735 ASSAY OF MAGNESIUM: CPT

## 2017-09-16 PROCEDURE — A4421 OSTOMY SUPPLY MISC: HCPCS

## 2017-09-16 PROCEDURE — 82728 ASSAY OF FERRITIN: CPT

## 2017-09-16 PROCEDURE — 6360000002 HC RX W HCPCS: Performed by: NURSE PRACTITIONER

## 2017-09-16 PROCEDURE — 85025 COMPLETE CBC W/AUTO DIFF WBC: CPT

## 2017-09-16 PROCEDURE — 2580000003 HC RX 258: Performed by: NURSE PRACTITIONER

## 2017-09-16 PROCEDURE — 83540 ASSAY OF IRON: CPT

## 2017-09-16 PROCEDURE — 84132 ASSAY OF SERUM POTASSIUM: CPT

## 2017-09-16 PROCEDURE — 2140000000 HC CCU INTERMEDIATE R&B

## 2017-09-16 PROCEDURE — 2500000003 HC RX 250 WO HCPCS: Performed by: INTERNAL MEDICINE

## 2017-09-16 PROCEDURE — 2580000003 HC RX 258: Performed by: INTERNAL MEDICINE

## 2017-09-16 PROCEDURE — 6360000002 HC RX W HCPCS: Performed by: ANESTHESIOLOGY

## 2017-09-16 PROCEDURE — 82140 ASSAY OF AMMONIA: CPT

## 2017-09-16 PROCEDURE — 6370000000 HC RX 637 (ALT 250 FOR IP): Performed by: INTERNAL MEDICINE

## 2017-09-16 PROCEDURE — 84100 ASSAY OF PHOSPHORUS: CPT

## 2017-09-16 PROCEDURE — 80053 COMPREHEN METABOLIC PANEL: CPT

## 2017-09-16 PROCEDURE — C9113 INJ PANTOPRAZOLE SODIUM, VIA: HCPCS | Performed by: NURSE PRACTITIONER

## 2017-09-16 PROCEDURE — 6370000000 HC RX 637 (ALT 250 FOR IP): Performed by: NURSE PRACTITIONER

## 2017-09-16 PROCEDURE — 83550 IRON BINDING TEST: CPT

## 2017-09-16 PROCEDURE — 36415 COLL VENOUS BLD VENIPUNCTURE: CPT

## 2017-09-16 PROCEDURE — 99233 SBSQ HOSP IP/OBS HIGH 50: CPT | Performed by: INTERNAL MEDICINE

## 2017-09-16 PROCEDURE — 6360000002 HC RX W HCPCS: Performed by: INTERNAL MEDICINE

## 2017-09-16 RX ORDER — CIPROFLOXACIN 500 MG/1
500 TABLET, FILM COATED ORAL EVERY 12 HOURS SCHEDULED
Status: DISCONTINUED | OUTPATIENT
Start: 2017-09-16 | End: 2017-09-17

## 2017-09-16 RX ORDER — POTASSIUM CHLORIDE 7.45 MG/ML
10 INJECTION INTRAVENOUS
Status: DISPENSED | OUTPATIENT
Start: 2017-09-16 | End: 2017-09-16

## 2017-09-16 RX ORDER — SODIUM CHLORIDE 450 MG/100ML
INJECTION, SOLUTION INTRAVENOUS CONTINUOUS
Status: DISCONTINUED | OUTPATIENT
Start: 2017-09-16 | End: 2017-09-16

## 2017-09-16 RX ORDER — POTASSIUM CHLORIDE AND SODIUM CHLORIDE 450; 150 MG/100ML; MG/100ML
INJECTION, SOLUTION INTRAVENOUS CONTINUOUS
Status: DISCONTINUED | OUTPATIENT
Start: 2017-09-16 | End: 2017-09-18

## 2017-09-16 RX ORDER — METRONIDAZOLE 500 MG/1
500 TABLET ORAL EVERY 6 HOURS SCHEDULED
Status: DISCONTINUED | OUTPATIENT
Start: 2017-09-16 | End: 2017-09-21

## 2017-09-16 RX ORDER — AMLODIPINE BESYLATE 5 MG/1
5 TABLET ORAL DAILY
Status: DISCONTINUED | OUTPATIENT
Start: 2017-09-16 | End: 2017-09-17

## 2017-09-16 RX ORDER — METOPROLOL TARTRATE 50 MG/1
50 TABLET, FILM COATED ORAL 2 TIMES DAILY
Status: DISCONTINUED | OUTPATIENT
Start: 2017-09-16 | End: 2017-09-21

## 2017-09-16 RX ADMIN — METRONIDAZOLE 500 MG: 500 TABLET, FILM COATED ORAL at 23:56

## 2017-09-16 RX ADMIN — CIPROFLOXACIN 500 MG: 500 TABLET, FILM COATED ORAL at 23:56

## 2017-09-16 RX ADMIN — CIPROFLOXACIN 500 MG: 500 TABLET, FILM COATED ORAL at 11:55

## 2017-09-16 RX ADMIN — LABETALOL HYDROCHLORIDE 1 MG/MIN: 5 INJECTION, SOLUTION INTRAVENOUS at 10:39

## 2017-09-16 RX ADMIN — METRONIDAZOLE 500 MG: 500 TABLET, FILM COATED ORAL at 13:22

## 2017-09-16 RX ADMIN — PANTOPRAZOLE SODIUM 40 MG: 40 INJECTION, POWDER, FOR SOLUTION INTRAVENOUS at 23:57

## 2017-09-16 RX ADMIN — LABETALOL HYDROCHLORIDE 2 MG/MIN: 5 INJECTION, SOLUTION INTRAVENOUS at 02:13

## 2017-09-16 RX ADMIN — SODIUM CHLORIDE: 9 INJECTION, SOLUTION INTRAVENOUS at 09:42

## 2017-09-16 RX ADMIN — METRONIDAZOLE 250 MG: 250 TABLET ORAL at 09:27

## 2017-09-16 RX ADMIN — AMLODIPINE BESYLATE 5 MG: 5 TABLET ORAL at 19:51

## 2017-09-16 RX ADMIN — METRONIDAZOLE 500 MG: 500 TABLET, FILM COATED ORAL at 19:51

## 2017-09-16 RX ADMIN — POTASSIUM CHLORIDE AND SODIUM CHLORIDE: 450; 150 INJECTION, SOLUTION INTRAVENOUS at 15:05

## 2017-09-16 RX ADMIN — LACTULOSE 20 G: 20 SOLUTION ORAL at 15:14

## 2017-09-16 RX ADMIN — Medication 10 ML: at 09:25

## 2017-09-16 RX ADMIN — PANTOPRAZOLE SODIUM 40 MG: 40 INJECTION, POWDER, FOR SOLUTION INTRAVENOUS at 09:25

## 2017-09-16 RX ADMIN — POTASSIUM CHLORIDE 10 MEQ: 7.46 INJECTION, SOLUTION INTRAVENOUS at 09:44

## 2017-09-16 RX ADMIN — POTASSIUM CHLORIDE 10 MEQ: 7.46 INJECTION, SOLUTION INTRAVENOUS at 13:23

## 2017-09-16 RX ADMIN — LEVOTHYROXINE SODIUM ANHYDROUS 62.5 MCG: 100 INJECTION, POWDER, LYOPHILIZED, FOR SOLUTION INTRAVENOUS at 09:20

## 2017-09-16 RX ADMIN — LACTULOSE 20 G: 20 SOLUTION ORAL at 09:26

## 2017-09-16 RX ADMIN — Medication 10 ML: at 23:57

## 2017-09-16 RX ADMIN — Medication 5 ML: at 09:24

## 2017-09-16 ASSESSMENT — PAIN SCALES - GENERAL: PAINLEVEL_OUTOF10: 3

## 2017-09-16 NOTE — PLAN OF CARE
Problem: Falls - Risk of  Goal: Absence of falls  Outcome: Ongoing  Monitoring patient for falls with each shift assessment and hourly rounding. Bed alarm activated and 3 rails up on the bed. Problem: Nutrition  Goal: Optimal nutrition therapy  Outcome: Ongoing    Comments:   Care plan reviewed with patient. Patient verbalize understanding of the plan of care and contribute to goal setting.

## 2017-09-16 NOTE — PROGRESS NOTES
Pt Name: Anaya Motley  MRN: [id number]  [id number]  YOB: 1940  Admit Date: 9/15/2017  4:22 AM  Date of evaluation: 9/16/2017  Primary Care Physician: Messi Caraballo   3B-24/024-BELGICA CHEUNG No complaints. Lethargic    O.     Vitals:    09/16/17 0930   BP: (!) 145/65   Pulse: 55   Resp: 16   Temp:    SpO2: 92%       Body mass index is 26.81 kg/(m^2). Lethargic, responds to pain   clear to auscultation bilaterally   regular rate and rhythm   Soft and nondistended with normal BS, nontender   no cyanosis, clubbing or edema present      Current Meds    potassium chloride  10 mEq Intravenous Q1H    ursodiol  500 mg Oral BID    nicotine  1 patch Transdermal Daily    phosphorus replacement protocol   Other RX Placeholder    levothyroxine  62.5 mcg Intravenous Daily    And    sodium chloride (PF)  5 mL Intravenous Daily    lactulose  20 g Oral TID    sodium chloride flush  10 mL Intravenous 2 times per day    pantoprazole  40 mg Intravenous BID    metroNIDAZOLE  250 mg Oral 2 times per day      sodium chloride 50 mL/hr at 09/16/17 0942    labetalol 1 mg/min (09/16/17 1039)     Diet: Diet NPO Effective Now    A.  68 y.o.  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 Cirrhosis, acutely decompensated   Cholestasis without Jaundice   Hepatic encephalopathy   Ascites - trace only, too little to tap    Venous insufficiency   Hypoalbuminemia   ^alk phos   Pancytopenia    Anemia, micro - AR by labs    thrombocytopenia   Jarvis Bryanna     Infection? - possible acalculous cholecystitis on U/S   Tumor? - imaging without mass    GI bleeding? - no signs of hemorrhage   Thrombosis? - no, dopplers normal   Alcoholic hepatitis? - no signs   Med Noncompliance? - probable   Diet Noncompliance? - no   Sedatives? - no    P.      Check AFP    Cont lactulose  Cont flagyl but increase to 500 q6h  Add cipro for possible acalculous cholecystitis    Cont NPO, begin TF tomorrow if MS not improving    Ady Acevedo MD  10:59 AM 9/16/2017

## 2017-09-16 NOTE — PROGRESS NOTES
Hospital Medicine Progress Note     Date:  9/16/2017    PCP: India Bauer (Tel: [phone])    Date of Admission: 9/15/2017      Brief hospital course: Patient with history of CASTANON and cirrhosis, transferred with report of altered mental status, uncontrolled hypertension with SBP in the 200s. She has history of atrial fibrillation, not on anticoagulation due to thrombocytopenia. Patient accepted on transfer, seen by Dr. Delfino Hamman on 9/15 (per discussion with nursing staff) - please refer to Dr. Estrella Breaux H&P for further details. Assessment:  Principal Problem:    Acute hepatic encephalopathy  Active Problems:    Hypertensive urgency    Pleural effusion, right    Cirrhosis of liver with ascites (HCC)    Essential hypertension    Hypothyroidism    History of atrial fibrillation    Primary biliary cirrhosis (HCC)    Thrombocytopenia (HCC)    Microcytic anemia    Hyperbilirubinemia    Hypokalemia    Urinary retention    Hypercalcemia, not POA        Plan:  1. Hepatic encephalopathy. Ammonia remains high. On lactulose, cipro, flagyl; ursodiol per hx of PBC. GI assisting with management. 2. Right pleural effusion, likely hepatic hydrothorax. No significant ascites amenable to paracentesis on abdominal US. Will check CT-chest without contrast to assess pleural effusion and decide if will benefit from thoracentesis. 3. Accelerated hypertension/hypertensive urgency. Continue labetalol infusion for now. Will transition to PO antihypertensives soon. 4. Hx of afib, not on anticoagulation per chronic thrombocytopenia and bleeding risk. 5. Urinary retention. Aguilar catheter had to be placed by urology; to follow up with urologist as outpatient. 6. Hypothyroidism. Continue IV synthroid as ordered. 7. Hypokalemia, resolved with replacement. 8. Hypercalcemia, mild. Suspect due to dehydration. Increased fluid; repeat Ca in AM.  9. Palliative care has been consulted.       Diet: Diet NPO Effective Now

## 2017-09-17 ENCOUNTER — APPOINTMENT (OUTPATIENT)
Dept: CT IMAGING | Age: 77
DRG: 441 | End: 2017-09-17
Attending: ANESTHESIOLOGY
Payer: MEDICARE

## 2017-09-17 PROBLEM — N17.9 ACUTE RENAL FAILURE (ARF) (HCC): Status: ACTIVE | Noted: 2017-09-17

## 2017-09-17 LAB
AFP-TUMOR MARKER: 2.2 UG/L
ALBUMIN SERPL-MCNC: 2.1 G/DL (ref 3.5–5.1)
ALP BLD-CCNC: 181 U/L (ref 38–126)
ALT SERPL-CCNC: 13 U/L (ref 11–66)
AMMONIA: 66 UMOL/L (ref 11–60)
ANION GAP SERPL CALCULATED.3IONS-SCNC: 11 MEQ/L (ref 8–16)
ANION GAP SERPL CALCULATED.3IONS-SCNC: 15 MEQ/L (ref 8–16)
ANISOCYTOSIS: ABNORMAL
AST SERPL-CCNC: 18 U/L (ref 5–40)
BASOPHILS # BLD: 0.2 %
BASOPHILS ABSOLUTE: 0 THOU/MM3 (ref 0–0.1)
BILIRUB SERPL-MCNC: 1.6 MG/DL (ref 0.3–1.2)
BUN BLDV-MCNC: 49 MG/DL (ref 7–22)
BUN BLDV-MCNC: 50 MG/DL (ref 7–22)
CALCIUM SERPL-MCNC: 11.5 MG/DL (ref 8.5–10.5)
CALCIUM SERPL-MCNC: 11.9 MG/DL (ref 8.5–10.5)
CHLORIDE BLD-SCNC: 107 MEQ/L (ref 98–111)
CHLORIDE BLD-SCNC: 108 MEQ/L (ref 98–111)
CO2: 22 MEQ/L (ref 23–33)
CO2: 25 MEQ/L (ref 23–33)
CREAT SERPL-MCNC: 1.5 MG/DL (ref 0.4–1.2)
CREAT SERPL-MCNC: 1.6 MG/DL (ref 0.4–1.2)
EOSINOPHIL # BLD: 0.1 %
EOSINOPHILS ABSOLUTE: 0 THOU/MM3 (ref 0–0.4)
GFR SERPL CREATININE-BSD FRML MDRD: 31 ML/MIN/1.73M2
GFR SERPL CREATININE-BSD FRML MDRD: 34 ML/MIN/1.73M2
GLUCOSE BLD-MCNC: 104 MG/DL (ref 70–108)
GLUCOSE BLD-MCNC: 118 MG/DL (ref 70–108)
HCT VFR BLD CALC: 28 % (ref 37–47)
HEMOGLOBIN: 9.2 GM/DL (ref 12–16)
HYPOCHROMIA: ABNORMAL
LYMPHOCYTES # BLD: 5.3 %
LYMPHOCYTES ABSOLUTE: 0.3 THOU/MM3 (ref 1–4.8)
MAGNESIUM: 1.9 MG/DL (ref 1.6–2.4)
MCH RBC QN AUTO: 25.8 PG (ref 27–31)
MCHC RBC AUTO-ENTMCNC: 32.8 GM/DL (ref 33–37)
MCV RBC AUTO: 78.6 FL (ref 81–99)
MICROCYTES: ABNORMAL
MONOCYTES # BLD: 9.9 %
MONOCYTES ABSOLUTE: 0.5 THOU/MM3 (ref 0.4–1.3)
NUCLEATED RED BLOOD CELLS: 0 /100 WBC
PDW BLD-RTO: 22.4 % (ref 11.5–14.5)
PHOSPHORUS: 3 MG/DL (ref 2.4–4.7)
PLATELET # BLD: 56 THOU/MM3 (ref 130–400)
PMV BLD AUTO: 8.9 MCM (ref 7.4–10.4)
POTASSIUM SERPL-SCNC: 3.9 MEQ/L (ref 3.5–5.2)
POTASSIUM SERPL-SCNC: 5 MEQ/L (ref 3.5–5.2)
PTH INTACT: 120.3 PG/ML (ref 15–65)
RBC # BLD: 3.57 MILL/MM3 (ref 4.2–5.4)
RBC # BLD: NORMAL 10*6/UL
SEG NEUTROPHILS: 84.5 %
SEGMENTED NEUTROPHILS ABSOLUTE COUNT: 4.3 THOU/MM3 (ref 1.8–7.7)
SODIUM BLD-SCNC: 144 MEQ/L (ref 135–145)
SODIUM BLD-SCNC: 144 MEQ/L (ref 135–145)
TOTAL PROTEIN: 5 G/DL (ref 6.1–8)
URINE CULTURE, ROUTINE: NORMAL
VITAMIN D 25-HYDROXY: 10 NG/ML (ref 30–100)
WBC # BLD: 5.1 THOU/MM3 (ref 4.8–10.8)

## 2017-09-17 PROCEDURE — 80053 COMPREHEN METABOLIC PANEL: CPT

## 2017-09-17 PROCEDURE — 85025 COMPLETE CBC W/AUTO DIFF WBC: CPT

## 2017-09-17 PROCEDURE — 82306 VITAMIN D 25 HYDROXY: CPT

## 2017-09-17 PROCEDURE — 83970 ASSAY OF PARATHORMONE: CPT

## 2017-09-17 PROCEDURE — 2140000000 HC CCU INTERMEDIATE R&B

## 2017-09-17 PROCEDURE — 80048 BASIC METABOLIC PNL TOTAL CA: CPT

## 2017-09-17 PROCEDURE — 82105 ALPHA-FETOPROTEIN SERUM: CPT

## 2017-09-17 PROCEDURE — 6360000002 HC RX W HCPCS: Performed by: NURSE PRACTITIONER

## 2017-09-17 PROCEDURE — 83735 ASSAY OF MAGNESIUM: CPT

## 2017-09-17 PROCEDURE — 99233 SBSQ HOSP IP/OBS HIGH 50: CPT | Performed by: INTERNAL MEDICINE

## 2017-09-17 PROCEDURE — 82140 ASSAY OF AMMONIA: CPT

## 2017-09-17 PROCEDURE — 6370000000 HC RX 637 (ALT 250 FOR IP): Performed by: INTERNAL MEDICINE

## 2017-09-17 PROCEDURE — 2500000003 HC RX 250 WO HCPCS: Performed by: INTERNAL MEDICINE

## 2017-09-17 PROCEDURE — C9113 INJ PANTOPRAZOLE SODIUM, VIA: HCPCS | Performed by: NURSE PRACTITIONER

## 2017-09-17 PROCEDURE — 6360000002 HC RX W HCPCS: Performed by: INTERNAL MEDICINE

## 2017-09-17 PROCEDURE — 2580000003 HC RX 258: Performed by: INTERNAL MEDICINE

## 2017-09-17 PROCEDURE — 84100 ASSAY OF PHOSPHORUS: CPT

## 2017-09-17 PROCEDURE — 2580000003 HC RX 258: Performed by: NURSE PRACTITIONER

## 2017-09-17 PROCEDURE — 71250 CT THORAX DX C-: CPT

## 2017-09-17 PROCEDURE — 6370000000 HC RX 637 (ALT 250 FOR IP): Performed by: PHARMACIST

## 2017-09-17 PROCEDURE — 6370000000 HC RX 637 (ALT 250 FOR IP): Performed by: NURSE PRACTITIONER

## 2017-09-17 PROCEDURE — 36415 COLL VENOUS BLD VENIPUNCTURE: CPT

## 2017-09-17 RX ORDER — AMLODIPINE BESYLATE 10 MG/1
10 TABLET ORAL DAILY
Status: DISCONTINUED | OUTPATIENT
Start: 2017-09-18 | End: 2017-10-02

## 2017-09-17 RX ORDER — AMLODIPINE BESYLATE 5 MG/1
5 TABLET ORAL ONCE
Status: COMPLETED | OUTPATIENT
Start: 2017-09-17 | End: 2017-09-17

## 2017-09-17 RX ORDER — CIPROFLOXACIN 500 MG/1
500 TABLET, FILM COATED ORAL EVERY 24 HOURS
Status: DISCONTINUED | OUTPATIENT
Start: 2017-09-17 | End: 2017-09-21

## 2017-09-17 RX ADMIN — LEVOTHYROXINE SODIUM ANHYDROUS 62.5 MCG: 100 INJECTION, POWDER, LYOPHILIZED, FOR SOLUTION INTRAVENOUS at 11:54

## 2017-09-17 RX ADMIN — METRONIDAZOLE 500 MG: 500 TABLET, FILM COATED ORAL at 11:53

## 2017-09-17 RX ADMIN — PANTOPRAZOLE SODIUM 40 MG: 40 INJECTION, POWDER, FOR SOLUTION INTRAVENOUS at 11:54

## 2017-09-17 RX ADMIN — Medication 10 ML: at 11:55

## 2017-09-17 RX ADMIN — POTASSIUM CHLORIDE AND SODIUM CHLORIDE: 450; 150 INJECTION, SOLUTION INTRAVENOUS at 16:52

## 2017-09-17 RX ADMIN — AMLODIPINE BESYLATE 5 MG: 5 TABLET ORAL at 15:34

## 2017-09-17 RX ADMIN — CIPROFLOXACIN 500 MG: 500 TABLET, FILM COATED ORAL at 21:18

## 2017-09-17 RX ADMIN — LACTULOSE 20 G: 20 SOLUTION ORAL at 15:34

## 2017-09-17 RX ADMIN — LACTULOSE 20 G: 20 SOLUTION ORAL at 11:53

## 2017-09-17 RX ADMIN — Medication 5 ML: at 11:55

## 2017-09-17 RX ADMIN — POTASSIUM CHLORIDE AND SODIUM CHLORIDE: 450; 150 INJECTION, SOLUTION INTRAVENOUS at 05:03

## 2017-09-17 RX ADMIN — AMLODIPINE BESYLATE 5 MG: 5 TABLET ORAL at 11:53

## 2017-09-17 ASSESSMENT — PAIN SCALES - GENERAL
PAINLEVEL_OUTOF10: 0

## 2017-09-17 NOTE — PROGRESS NOTES
Pt Name: Jude Gardner  MRN: [id number]  [id number]  YOB: 1940  Admit Date: 9/15/2017  4:22 AM  Date of evaluation: 9/17/2017  Primary Care Physician: Parisa Collins   3B-24/024-BELGICA CHEUNG No complaints. Much more awake today. O.     Vitals:    09/17/17 0930   BP: (!) 155/71   Pulse: 54   Resp: 16   Temp: 97.4 °F (36.3 °C)   SpO2: 92%       Body mass index is 27.93 kg/(m^2). Awake but confused   clear to auscultation bilaterally   regular rate and rhythm   Soft and nondistended with normal BS, nontender   no cyanosis, clubbing or edema present      Current Meds    ciprofloxacin  500 mg Oral Q24H    metroNIDAZOLE  500 mg Oral 4 times per day    metoprolol tartrate  50 mg Oral BID    amLODIPine  5 mg Oral Daily    ursodiol  500 mg Oral BID    nicotine  1 patch Transdermal Daily    phosphorus replacement protocol   Other RX Placeholder    levothyroxine  62.5 mcg Intravenous Daily    And    sodium chloride (PF)  5 mL Intravenous Daily    lactulose  20 g Oral TID    sodium chloride flush  10 mL Intravenous 2 times per day    pantoprazole  40 mg Intravenous BID      0.45 % NaCl with KCl 20 mEq 75 mL/hr at 09/17/17 0503     Diet: Diet NPO Effective Now    A.  68 y.o.  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       Cirrhosis, acutely decompensated   Cholestasis without Jaundice   Hepatic encephalopathy   Ascites - trace only, too little to tap    Venous insufficiency   Hypoalbuminemia   ^alk phos   Pancytopenia    Anemia, micro - AR by labs    thrombocytopenia   Rikki Ates     Infection? - possible acalculous cholecystitis on U/S   Tumor? - imaging without mass    GI bleeding? - no signs of hemorrhage   Thrombosis? - no, dopplers normal   Alcoholic hepatitis? - no signs   Med Noncompliance? - probable   Diet Noncompliance? - no   Sedatives? - no    P.     Await AFP    Cont lactulose  Cont flagyl but increase to 500 q6h  Cont cipro for possible acalculous cholecystitis    Begin diet  If will take meds po, may pull ngt    Will need iron supp when taking po well    Gianna Carey MD  9:52 AM 9/17/2017

## 2017-09-17 NOTE — PROGRESS NOTES
replacement. 10. Hypercalcemia. Check PTH, 25-OH vitamin D. Diet: Diet NPO Effective Now    Code status: Full Code     ----------  Subjective  Remains confused. Objective  Physical exam:  Vitals: BP (!) 140/59  Pulse 54  Temp 96.1 °F (35.6 °C) (Axillary)   Resp 15  Ht 5' 1\" (1.549 m)  Wt 147 lb 12.8 oz (67 kg)  SpO2 92%  BMI 27.93 kg/m2  Gen: Not in distress. More awake but still confused. Head: Normocephalic. Atraumatic. Eyes: EOMI. Good acuity. ENT: Oral mucosa dry  Neck: No JVD. No obvious thyromegaly. CVS: Nml S1S2, no MRG, RRR  Pulmomary: Clear bilaterally. No crackles. No wheezes. Gastrointestinal: Soft, non-tender, nondistended  Musculoskeletal: Trace pedal edema. Warm  Neuro: No focal deficit. Moves extremity spontaneously. Psychiatry: Appropriate affect. Not agitated. Skin: Warm, dry with normal turgor. No rash    24HR INTAKE/OUTPUT:      Intake/Output Summary (Last 24 hours) at 09/17/17 0847  Last data filed at 09/17/17 0359   Gross per 24 hour   Intake          1835.88 ml   Output              350 ml   Net          1485.88 ml     I/O last 3 completed shifts: In: 1835.9 [I.V.:1785. 9; NG/GT:50]  Out: 350 [Urine:350]       Meds:    ciprofloxacin  500 mg Oral Q24H    [START ON 9/18/2017] amLODIPine  10 mg Oral Daily    amLODIPine  5 mg Oral Once    metroNIDAZOLE  500 mg Oral 4 times per day    metoprolol tartrate  50 mg Oral BID    ursodiol  500 mg Oral BID    nicotine  1 patch Transdermal Daily    phosphorus replacement protocol   Other RX Placeholder    levothyroxine  62.5 mcg Intravenous Daily    And    sodium chloride (PF)  5 mL Intravenous Daily    lactulose  20 g Oral TID    sodium chloride flush  10 mL Intravenous 2 times per day    pantoprazole  40 mg Intravenous BID       Infusions:    0.45 % NaCl with KCl 20 mEq 75 mL/hr at 09/17/17 0503       PRN Meds: acetaminophen, magnesium hydroxide, ondansetron, potassium chloride, magnesium sulfate, labetalol, sodium

## 2017-09-17 NOTE — PROGRESS NOTES
Pharmacy Renal Adjustment Consult    Steve Weaver is a 68 y.o. female. Pharmacy has been consulted to renally adjust Cipro per P&T Protocol. Recent Labs      09/16/17   0508  09/17/17   0412   BUN  43*  49*       Recent Labs      09/16/17   0508  09/17/17   0412   CREATININE  1.3*  1.6*       Estimated Creatinine Clearance: 26 mL/min (based on Cr of 1.6).     CrCl 22ml/min using IBW    Height:   Ht Readings from Last 1 Encounters:   09/15/17 5' 1\" (1.549 m)     Weight:  Wt Readings from Last 1 Encounters:   09/17/17 147 lb 12.8 oz (67 kg)           Plan: Adjust the following medications based on renal function:           Cipro to 500mg PO Q24h    Reina Wright PharmD, BCPS 9/17/2017 9:33 AM

## 2017-09-18 LAB
ALBUMIN SERPL-MCNC: 2.4 G/DL (ref 3.5–5.1)
ALP BLD-CCNC: 192 U/L (ref 38–126)
ALT SERPL-CCNC: 15 U/L (ref 11–66)
AMMONIA: 38 UMOL/L (ref 11–60)
ANION GAP SERPL CALCULATED.3IONS-SCNC: 12 MEQ/L (ref 8–16)
ANISOCYTOSIS: ABNORMAL
AST SERPL-CCNC: 24 U/L (ref 5–40)
BASOPHILS # BLD: 0.4 %
BASOPHILS ABSOLUTE: 0 THOU/MM3 (ref 0–0.1)
BILIRUB SERPL-MCNC: 1.8 MG/DL (ref 0.3–1.2)
BUN BLDV-MCNC: 53 MG/DL (ref 7–22)
CALCIUM SERPL-MCNC: 11.5 MG/DL (ref 8.5–10.5)
CHLORIDE BLD-SCNC: 107 MEQ/L (ref 98–111)
CO2: 21 MEQ/L (ref 23–33)
CREAT SERPL-MCNC: 1.7 MG/DL (ref 0.4–1.2)
CREATININE URINE: 61.7 MG/DL
EOSINOPHIL # BLD: 0.4 %
EOSINOPHIL SMEAR: PRESENT
EOSINOPHILS ABSOLUTE: 0 THOU/MM3 (ref 0–0.4)
GFR SERPL CREATININE-BSD FRML MDRD: 29 ML/MIN/1.73M2
GLUCOSE BLD-MCNC: 102 MG/DL (ref 70–108)
HCT VFR BLD CALC: 30.9 % (ref 37–47)
HEMOGLOBIN: 9.9 GM/DL (ref 12–16)
HYPOCHROMIA: ABNORMAL
LYMPHOCYTES # BLD: 4 %
LYMPHOCYTES ABSOLUTE: 0.3 THOU/MM3 (ref 1–4.8)
MAGNESIUM: 1.9 MG/DL (ref 1.6–2.4)
MCH RBC QN AUTO: 25.2 PG (ref 27–31)
MCHC RBC AUTO-ENTMCNC: 32.1 GM/DL (ref 33–37)
MCV RBC AUTO: 78.5 FL (ref 81–99)
MICROCYTES: ABNORMAL
MONOCYTES # BLD: 10.2 %
MONOCYTES ABSOLUTE: 0.7 THOU/MM3 (ref 0.4–1.3)
NUCLEATED RED BLOOD CELLS: 0 /100 WBC
PDW BLD-RTO: 22.1 % (ref 11.5–14.5)
PHOSPHORUS: 3 MG/DL (ref 2.4–4.7)
PLATELET # BLD: 55 THOU/MM3 (ref 130–400)
PMV BLD AUTO: 9 MCM (ref 7.4–10.4)
POTASSIUM SERPL-SCNC: 4.3 MEQ/L (ref 3.5–5.2)
PROCALCITONIN: 0.16 NG/ML (ref 0.01–0.09)
PROTEIN, URINE: 41.9 MG/DL
RBC # BLD: 3.93 MILL/MM3 (ref 4.2–5.4)
RBC # BLD: NORMAL 10*6/UL
SEG NEUTROPHILS: 85 %
SEGMENTED NEUTROPHILS ABSOLUTE COUNT: 5.5 THOU/MM3 (ref 1.8–7.7)
SODIUM BLD-SCNC: 140 MEQ/L (ref 135–145)
SPECIMEN: NORMAL
TOTAL PROTEIN: 5.5 G/DL (ref 6.1–8)
WBC # BLD: 6.5 THOU/MM3 (ref 4.8–10.8)

## 2017-09-18 PROCEDURE — 6360000002 HC RX W HCPCS: Performed by: NURSE PRACTITIONER

## 2017-09-18 PROCEDURE — 2140000000 HC CCU INTERMEDIATE R&B

## 2017-09-18 PROCEDURE — 6370000000 HC RX 637 (ALT 250 FOR IP): Performed by: PHARMACIST

## 2017-09-18 PROCEDURE — 82340 ASSAY OF CALCIUM IN URINE: CPT

## 2017-09-18 PROCEDURE — 84100 ASSAY OF PHOSPHORUS: CPT

## 2017-09-18 PROCEDURE — 6370000000 HC RX 637 (ALT 250 FOR IP): Performed by: INTERNAL MEDICINE

## 2017-09-18 PROCEDURE — 81050 URINALYSIS VOLUME MEASURE: CPT

## 2017-09-18 PROCEDURE — 99233 SBSQ HOSP IP/OBS HIGH 50: CPT | Performed by: INTERNAL MEDICINE

## 2017-09-18 PROCEDURE — 82570 ASSAY OF URINE CREATININE: CPT

## 2017-09-18 PROCEDURE — 84156 ASSAY OF PROTEIN URINE: CPT

## 2017-09-18 PROCEDURE — 89190 NASAL SMEAR FOR EOSINOPHILS: CPT

## 2017-09-18 PROCEDURE — 36415 COLL VENOUS BLD VENIPUNCTURE: CPT

## 2017-09-18 PROCEDURE — 6360000002 HC RX W HCPCS: Performed by: INTERNAL MEDICINE

## 2017-09-18 PROCEDURE — 83735 ASSAY OF MAGNESIUM: CPT

## 2017-09-18 PROCEDURE — 2580000003 HC RX 258: Performed by: INTERNAL MEDICINE

## 2017-09-18 PROCEDURE — C9113 INJ PANTOPRAZOLE SODIUM, VIA: HCPCS | Performed by: NURSE PRACTITIONER

## 2017-09-18 PROCEDURE — 99222 1ST HOSP IP/OBS MODERATE 55: CPT | Performed by: NURSE PRACTITIONER

## 2017-09-18 PROCEDURE — 6370000000 HC RX 637 (ALT 250 FOR IP): Performed by: NURSE PRACTITIONER

## 2017-09-18 PROCEDURE — 99223 1ST HOSP IP/OBS HIGH 75: CPT | Performed by: INTERNAL MEDICINE

## 2017-09-18 PROCEDURE — 84145 PROCALCITONIN (PCT): CPT

## 2017-09-18 PROCEDURE — 2500000003 HC RX 250 WO HCPCS: Performed by: INTERNAL MEDICINE

## 2017-09-18 PROCEDURE — 82140 ASSAY OF AMMONIA: CPT

## 2017-09-18 PROCEDURE — 99222 1ST HOSP IP/OBS MODERATE 55: CPT | Performed by: INTERNAL MEDICINE

## 2017-09-18 PROCEDURE — 85025 COMPLETE CBC W/AUTO DIFF WBC: CPT

## 2017-09-18 PROCEDURE — 80053 COMPREHEN METABOLIC PANEL: CPT

## 2017-09-18 PROCEDURE — 2580000003 HC RX 258: Performed by: NURSE PRACTITIONER

## 2017-09-18 RX ORDER — SODIUM CHLORIDE 450 MG/100ML
INJECTION, SOLUTION INTRAVENOUS CONTINUOUS
Status: DISCONTINUED | OUTPATIENT
Start: 2017-09-18 | End: 2017-09-18

## 2017-09-18 RX ORDER — SODIUM CHLORIDE 9 MG/ML
INJECTION, SOLUTION INTRAVENOUS CONTINUOUS
Status: DISCONTINUED | OUTPATIENT
Start: 2017-09-18 | End: 2017-09-23

## 2017-09-18 RX ORDER — HYDRALAZINE HYDROCHLORIDE 25 MG/1
25 TABLET, FILM COATED ORAL EVERY 8 HOURS SCHEDULED
Status: DISCONTINUED | OUTPATIENT
Start: 2017-09-18 | End: 2017-09-20

## 2017-09-18 RX ORDER — FAMOTIDINE 20 MG/1
20 TABLET, FILM COATED ORAL DAILY
Status: DISCONTINUED | OUTPATIENT
Start: 2017-09-18 | End: 2017-10-05 | Stop reason: HOSPADM

## 2017-09-18 RX ORDER — CINACALCET 30 MG/1
30 TABLET, FILM COATED ORAL DAILY
Status: DISCONTINUED | OUTPATIENT
Start: 2017-09-18 | End: 2017-10-02

## 2017-09-18 RX ORDER — CALCITONIN SALMON 200 [USP'U]/ML
100 INJECTION, SOLUTION INTRAMUSCULAR; SUBCUTANEOUS ONCE
Status: COMPLETED | OUTPATIENT
Start: 2017-09-18 | End: 2017-09-18

## 2017-09-18 RX ADMIN — PIPERACILLIN SODIUM,TAZOBACTAM SODIUM 3.38 G: 3; .375 INJECTION, POWDER, FOR SOLUTION INTRAVENOUS at 16:48

## 2017-09-18 RX ADMIN — LACTULOSE 20 G: 20 SOLUTION ORAL at 21:03

## 2017-09-18 RX ADMIN — PANTOPRAZOLE SODIUM 40 MG: 40 INJECTION, POWDER, FOR SOLUTION INTRAVENOUS at 08:49

## 2017-09-18 RX ADMIN — METOPROLOL TARTRATE 50 MG: 50 TABLET, FILM COATED ORAL at 21:02

## 2017-09-18 RX ADMIN — POTASSIUM CHLORIDE AND SODIUM CHLORIDE: 450; 150 INJECTION, SOLUTION INTRAVENOUS at 01:42

## 2017-09-18 RX ADMIN — SODIUM CHLORIDE: 9 INJECTION, SOLUTION INTRAVENOUS at 17:31

## 2017-09-18 RX ADMIN — METOPROLOL TARTRATE 50 MG: 50 TABLET, FILM COATED ORAL at 08:49

## 2017-09-18 RX ADMIN — FAMOTIDINE 20 MG: 20 TABLET, FILM COATED ORAL at 17:31

## 2017-09-18 RX ADMIN — METRONIDAZOLE 500 MG: 500 TABLET, FILM COATED ORAL at 17:31

## 2017-09-18 RX ADMIN — Medication 10 ML: at 21:03

## 2017-09-18 RX ADMIN — CINACALCET HYDROCHLORIDE 30 MG: 30 TABLET, COATED ORAL at 17:31

## 2017-09-18 RX ADMIN — METRONIDAZOLE 500 MG: 500 TABLET, FILM COATED ORAL at 13:43

## 2017-09-18 RX ADMIN — Medication 10 ML: at 08:50

## 2017-09-18 RX ADMIN — PIPERACILLIN SODIUM,TAZOBACTAM SODIUM 3.38 G: 3; .375 INJECTION, POWDER, FOR SOLUTION INTRAVENOUS at 08:56

## 2017-09-18 RX ADMIN — CALCITONIN SALMON 100 UNITS: 200 INJECTION, SOLUTION INTRAMUSCULAR; SUBCUTANEOUS at 17:31

## 2017-09-18 RX ADMIN — Medication 5 ML: at 08:50

## 2017-09-18 RX ADMIN — CIPROFLOXACIN 500 MG: 500 TABLET, FILM COATED ORAL at 21:03

## 2017-09-18 RX ADMIN — AMLODIPINE BESYLATE 10 MG: 10 TABLET ORAL at 08:49

## 2017-09-18 RX ADMIN — SODIUM CHLORIDE: 4.5 INJECTION, SOLUTION INTRAVENOUS at 08:50

## 2017-09-18 RX ADMIN — LEVOTHYROXINE SODIUM ANHYDROUS 62.5 MCG: 100 INJECTION, POWDER, LYOPHILIZED, FOR SOLUTION INTRAVENOUS at 08:49

## 2017-09-18 RX ADMIN — HYDRALAZINE HYDROCHLORIDE 25 MG: 25 TABLET, FILM COATED ORAL at 08:56

## 2017-09-18 RX ADMIN — HYDRALAZINE HYDROCHLORIDE 25 MG: 25 TABLET, FILM COATED ORAL at 13:43

## 2017-09-18 RX ADMIN — METRONIDAZOLE 500 MG: 500 TABLET, FILM COATED ORAL at 08:51

## 2017-09-18 RX ADMIN — LACTULOSE 20 G: 20 SOLUTION ORAL at 08:49

## 2017-09-18 RX ADMIN — LACTULOSE 20 G: 20 SOLUTION ORAL at 13:43

## 2017-09-18 ASSESSMENT — PAIN SCALES - GENERAL
PAINLEVEL_OUTOF10: 0
PAINLEVEL_OUTOF10: 0

## 2017-09-18 NOTE — PROGRESS NOTES
Hospital Medicine Progress Note     Date:  9/18/2017    PCP: Jair Garces (Tel: [phone])    Date of Admission: 9/15/2017      Brief hospital course: Patient with history of CASTANON and cirrhosis, transferred with report of altered mental status, uncontrolled hypertension with SBP in the 200s. She has history of atrial fibrillation, not on anticoagulation due to thrombocytopenia. Patient accepted on transfer, seen by Dr. Raina Louie on 9/15 (per discussion with nursing staff) - please refer to Dr. Carlyn Klinefelter H&P for further details. Assessment:  Principal Problem:    Acute hepatic encephalopathy  Active Problems:    Hypertensive urgency    Pleural effusion, right    Cirrhosis of liver with ascites (HCC)    Essential hypertension    Hypothyroidism    History of atrial fibrillation    Primary biliary cirrhosis (HCC)    Thrombocytopenia (HCC)    Microcytic anemia    Hyperbilirubinemia    Hypokalemia    Urinary retention    Hypercalcemia, not POA    Acute renal failure, not POA        Plan:  1. Hepatic encephalopathy. Elevated ammonia on admission. On lactulose; monitor clinical improvement of confusion. Hx of PBC, on ursodiol per hx of PBC. GI assisting with management. 2. Biliary sludge; cannot exclude acalculous cholecystitis. On cipro/flagyl per GI recommendations. 3. Bacterial pneumonia, aspiration suspected. CT-chest with findings concerning for aspiration pneumonia, likely POA. Cultures obtained on admission. Added zosyn to treatment. 4. Right pleural effusion, small and likely parapneumonic. No significant ascites amenable to paracentesis on abdominal US to completely associate with hepatic hydrothorax. Consult pulm to assist with management. 5. Accelerated hypertension/hypertensive urgency. Was on labetalol infusion, which has been titrated off. Continue PO BP meds as ordered. 6. Acute renal failure, not POA. Likely secondary to pre-renal azotemia.  Continue fluid and monitor renal function. Nephro consult. 7. Hx of afib, not on anticoagulation per chronic thrombocytopenia and bleeding risk. 8. Urinary retention. Aguilar catheter had to be placed by urology; to follow up with urologist as outpatient. 9. Hypothyroidism. Continue IV synthroid as ordered for now. 10. Hypokalemia, resolved with replacement. Stopped K in IVF. 11. Hypercalcemia. PTH elevated, vitamin D low. Consult endocrinologist.        Diet: DIET CARDIAC;    Code status: Full Code     ----------  Subjective  Denies any complaints. Was refusing labs as of this morning. Objective  Physical exam:  Vitals: BP (!) 143/65  Pulse 62  Temp 96.4 °F (35.8 °C) (Axillary)   Resp 16  Ht 5' 1\" (1.549 m)  Wt 143 lb 4.8 oz (65 kg)  SpO2 94%  BMI 27.08 kg/m2  Gen: Not in distress. More awake, confusion slightly improved  Head: Normocephalic. Atraumatic. Eyes: EOMI. Good acuity. ENT: Oral mucosa dry  Neck: No JVD. No obvious thyromegaly. CVS: Nml S1S2, no MRG, RRR  Pulmomary: Clear bilaterally. No crackles. No wheezes. Gastrointestinal: Soft, non-tender, nondistended  Musculoskeletal: Trace pedal edema. Warm  Neuro: No focal deficit. Moves extremity spontaneously. Psychiatry: Appropriate affect. Not agitated. Skin: Warm, dry with normal turgor. No rash    24HR INTAKE/OUTPUT:      Intake/Output Summary (Last 24 hours) at 09/18/17 0735  Last data filed at 09/18/17 0654   Gross per 24 hour   Intake          3736.03 ml   Output              400 ml   Net          3336.03 ml     I/O last 3 completed shifts: In: 6776 [P.O.:1270;  I.V.:2466]  Out: 400 [Urine:400]       Meds:    piperacillin-tazobactam  3.375 g Intravenous Q8H    hydrALAZINE  25 mg Oral 3 times per day    ciprofloxacin  500 mg Oral Q24H    amLODIPine  10 mg Oral Daily    metroNIDAZOLE  500 mg Oral 4 times per day    metoprolol tartrate  50 mg Oral BID    ursodiol  500 mg Oral BID    nicotine  1 patch Transdermal Daily    phosphorus replacement protocol   Other RX Placeholder    levothyroxine  62.5 mcg Intravenous Daily    And    sodium chloride (PF)  5 mL Intravenous Daily    lactulose  20 g Oral TID    sodium chloride flush  10 mL Intravenous 2 times per day    pantoprazole  40 mg Intravenous BID       Infusions:    sodium chloride         PRN Meds: acetaminophen, magnesium hydroxide, ondansetron, potassium chloride, magnesium sulfate, labetalol, sodium chloride flush    Labs/imaging:  CBC:   Recent Labs      09/16/17   0508  09/17/17   0412   WBC  4.3*  5.1   HGB  9.2*  9.2*   PLT  60*  56*         BMP:    Recent Labs      09/16/17   0508  09/17/17   0412  09/17/17   1645   NA  147*  144  144   K  3.6  3.9  5.0   CL  109  108  107   CO2  26  25  22*   BUN  43*  49*  50*   CREATININE  1.3*  1.6*  1.5*   GLUCOSE  155*  118*  104         Hepatic:   Recent Labs      09/16/17   0508  09/17/17   0412   AST  15  18   ALT  14  13   BILITOT  2.2*  1.6*   ALKPHOS  180*  181*     INR:   Recent Labs      09/15/17   1117   INR  1.09     Reviewed imaging and reports noted    Diet: DIET CARDIAC;    Raad Anne MD  -------------------------------  Rounding hospitalist

## 2017-09-18 NOTE — H&P
[address] [id number]                             HISTORY AND PHYSICAL    PATIENT NAME: JENSEN RAMON                          :             1940  MED REC NO:   [id number]                            ROOM:  ACCOUNT NO:   [de-identified]                           ADMISSION DATE:  09/15/2017  PROVIDER:     SANDY Franks [facility]:  09/15/2017    CHIEF COMPLAINT:  Hypertensive urgency, CASTANON with jaundice,  urethrovaginal fistula, and inability to urinate. HISTORY OF PRESENT ILLNESS:  A 59-year-old  female brought in  from [facility]. She was admitted there for altered mental status,  but she has also been found to have hypertension with blood pressures  above 200 at times. She also could not urinate. They tried to put a  Aguilar in, but it came out the vagina through urethrovaginal fistula. They even got the most experienced people from the OB/GYN nurses to  put the Aguilar in, and they were not able to do so. She has also got  CASTANON and a bit jaundiced and a little bit cirrhotic. There were not  any real abdominal ascites. She was brought here for further  evaluation and management of the urethrovaginal fistula and also her  blood pressure control. REVIEW OF SYSTEMS:  GENERAL:  Decreased activity and fatigue. Decreased appetite. No  weight loss. RESPIRATORY:  No shortness of breath. No cough. No phlegm  production. No hemoptysis. No wheezing. No stridor. CARDIOVASCULAR:  No chest pain. No palpitations. No leg swelling. GASTROINTESTINAL:  No nausea or vomiting. No hematemesis. No  abdominal pain. No abdominal distention. No constipation or  diarrhea. No melena or hematochezia. GENITOURINARY:  Cannot urinate.   Tried to put Aguilar in, was  unsuccessful because of the urethrovaginal fistula, but no dysuria and  no hematuria is noted at least.  NEUROLOGICAL:  Affect is somewhat depressed, and daily.  4.  She is on nystatin p.o., takes 5 mL by mouth four times daily. 5.  She is on omeprazole 20 mg delayed release capsule 40 mg taken by  mouth daily. 6.  Ursodiol or Actigall 500 mg by mouth two times daily. PHYSICAL EXAMINATION:  VITAL SIGNS:  Blood pressure is 144/77, pulse is 68, normal sinus  rhythm, regular rate and rhythm on monitor. Respirations are 18. Saturation 92% to 93% on room air. Temperature 98.9 degrees  Fahrenheit, which is high since she had been here apparently. Weight  is 62.9 kg. GENERAL:  She is somewhat somnolent, mildly encephalopathic, elderly   female, apparently in no real acute distress at the present  moment. No chest pain, no shortness of breath, but she just wakes up  enough to follow some commands and answer a question or two and then  goes into a glaze and stare and is no longer answering questions. HEENT:  Normocephalic, atraumatic. Pupils are equal and reactive to  light. Status post cataract surgery bilaterally. Extraocular muscles  are intact. Sclerae are icteric. Conjunctivae are icteric. NECK:  Supple. Free range of motion. No meningismus or nuchal  rigidity. No vertebral tenderness. No JVD noted. Carotid pulses  present. No bruits present. Trachea midline and mobile. No  thyromegaly noted. CHEST:  Somewhat barrel-chested. No other deformities or trauma. Equal excursion, bilateral respiratory effort. No use of accessory  muscles of respiration. No respiratory distress. LUNGS:  Coarse rhonchi throughout. Somewhat cleared with coughing. No wheezing, rales, or crackles. HEART:  PMI is displaced a little bit left of left midclavicular line,  somewhat enlarged. Normal S1. Normal S2. A 1/6 systolic ejection  murmur in upper right sternal border. No S3. No S4. No other  murmurs, gallops, rubs, or clicks. ABDOMEN:  Soft. Nondistended. Nontender. No peritoneal signs. No  hepatosplenomegaly. No masses felt.   Bowel sounds hypoactive in all  four quadrants. BACK:  No spinal tenderness to percussion. No CVA tenderness to  percussion bilaterally. GENITOURINARY:  Has urethrovaginal fistula as mentioned above. EXTREMITIES:  No cyanosis, clubbing, or edema. Radial pulses are  4+/4+ bilaterally. Pedal pulses are 1+/4+ bilaterally. No pedal  edema. No ankle edema. Calves are nontender. Negative Homans sign. No evidence of DVT. NEUROLOGIC:  Alert and oriented x3, but somewhat sluggish to answer  questions. Cranial nerves II through XII appear to be intact. Motor  is 5/5 in all extremities and all muscle groups. Sensory exam is hard  to perform because she is not very cooperative. SKIN:  Warm and dry and jaundiced. PSYCHIATRIC:  Somewhat flat affect. Somewhat somnolent and somewhat  minimally obtunded. It is hard to assess her psychiatric status at  the present moment, but appears to have a flat affect probably related  to her hyperammonemia. Ultrasound of liver, liver is somewhat decreased in size. No focal or  intrahepatic ductal dilatation seen. There is multiple tortuous  vessel at the new hepatis. Possible recannulation of the umbilicus  vein. There are multiple hypoechoic masses at the new hepatis  indicating periportal pericaval adenopathy. Gallbladder shows no  evidence of stones. There is a gallbladder wall thickening and  pericholecystic edema and sludge within the gallbladder. Common bile  duct is enlarged. IMPRESSION:  Cirrhotic liver with possible recannulated emboli of the  vein. Marked periportal pericaval adenopathy. Gallbladder sludge  with gallbladder wall thickening and pericholecystic edema with  acalculous cholecystitis is a possibility. Ultrasound of the  gallbladder, right upper quadrant, refer to above ultrasound. Chest x-ray portable shows NG tube is well into stomach. LABORATORY DATA:  Sodium 145. Potassium 3.1. Chloride 105. Bicarb  28. BUN is up at 37.   Creatinine is normal at 1.1 with estimated GFR  of 48. Anion gap 12.0. Magnesium 1.8. Lactic acid 2.2. Glucose is  up at 165. Calcium 10.3. Phosphorus 2.4. Total protein 5.5. Albumin 2.2. Alkaline phosphatase is 197 which is up. Bilirubin is  up at 2.5 and direct bilirubin is up at 1.3. Total protein is down at  5.5. CBC showed white blood cell count of 4.9 with a left shift of  83.1% neutrophils. H and H is down at 9.6 and 29.0.  RBC indices were  down with MCV being 77.9, MCH being 25.8, MCHC being 33.1 which is  normal.  RDW is way up at 20.8. There is 1+ poikilocytosis, 2+  anisocytosis, 1+ hypochromia, and 1+ macrocytes on the RBC morphology. Platelet count is 64. INR is 1.09. Urinalysis showed clear yellow  urine, pH 6.5, specific gravity 1.016, negative nitrite, negative  leukocyte esterase. Negative ketones, negative bilirubin, trace  blood, protein 100, no casts seen. Wbc only 2 to 5, red blood cells  only 5 to 10 per high-powered field. Epithelial cells is 0 to 2. No  renal epithelial cells. No bacteria. No yeast seen. No crystal  seen. ASSESSMENT AND PLAN:  1. Hypertensive urgency, appears to be controlled now with  medications and p.r.n. labetalol. 2. CASTANON with hyperammonemia, put her on lactulose. 3.  DVT prophylaxis. 4.  Hypothyroidism. Continue her levothyroxine. 5.  Put her on Flagyl for any possible infection. 6.  Smoker. Put her on Nicoderm patch. 7.  GI prophylaxis and GERD, treatment with 40 mg Protonix daily IV. 8.  Continue her ursodiol. 9.  Urethrovaginal fistula, Urology to attend to. 10. She is on labetalol drip for pressures. I spent 70 minutes evaluating and managing the patient including  documentation, review of records, and examination of the patient. This 70 minutes of hospitalist time not including time spent  performing procedures as none were performed.         Kofi Ac M.D.    D: 09/15/2017 19:46:48       T: 09/15/2017 20:00:14 [id number]  Job#: [id number]     Doc#: [id number]

## 2017-09-18 NOTE — PLAN OF CARE
Problem: Falls - Risk of  Goal: Absence of falls  Outcome: Met This Shift  Patient remained free of falls. Continues on bedrest with bed alarm on. Frequent rounding due to confusion. Problem: Nutrition  Goal: Optimal nutrition therapy  Outcome: Ongoing  Alertness improving to allow oral intake to be initiated. Problem: Risk for Impaired Skin Integrity  Goal: Tissue integrity - skin and mucous membranes  Structural intactness and normal physiological function of skin and  mucous membranes. Outcome: Ongoing  Patient refuses to be turned by staff. Insists she is capable of doing this herself. Problem: Discharge Planning:  Goal: Participates in care planning  Participates in care planning  Outcome: Not Met This Shift  Patient's confusion continues and she is not able to participate in care planning. Goal: Discharged to appropriate level of care  Discharged to appropriate level of care  Outcome: Ongoing  Patient not yet ready for discharge. Problem: Airway Clearance - Ineffective:  Goal: Ability to maintain a clear airway will improve  Ability to maintain a clear airway will improve  Outcome: Ongoing  Patient's airway remains clear. Monitor for aspiration. Problem: Anxiety/Stress:  Goal: Level of anxiety will decrease  Level of anxiety will decrease  Outcome: Ongoing  Patient continues to get agitated with staff due to confusion. Family called to assist in calming patient. Problem: Aspiration:  Goal: Absence of aspiration  Absence of aspiration  Outcome: Met This Shift  Patient's airway remained clear. No aspiration this shift. Problem: Bowel Function - Altered:  Goal: Bowel elimination is within specified parameters  Bowel elimination is within specified parameters  Outcome: Ongoing  Patient refusing Lactulose at this time. One small bowel movement this shift.     Problem: Cardiac Output - Decreased:  Goal: Hemodynamic stability will improve  Hemodynamic stability will improve  Outcome: Ongoing  Patient's vital signs improved. Legs elevated at all times. Problem: Mental Status - Impaired:  Goal: Mental status will be restored to baseline  Mental status will be restored to baseline  Outcome: Ongoing  Patient's mental status is slowly improving. Not yet ready for discharge. Problem: Nutrition Deficit:  Goal: Ability to achieve adequate nutritional intake will improve  Ability to achieve adequate nutritional intake will improve  Outcome: Ongoing  Patient more alert and able to initiate oral intake. Comments:   Care plan reviewed with patient and patient verbalized understanding of the care plan and contributing to goal setting.

## 2017-09-18 NOTE — CONSULTS
[facility]    Patient - Lissett Mir   MRN -  [id number]   [facility] # - [de-identified]   - 1940      Date of Admission -  9/15/2017  4:22 AM  Date of evaluation -  2017  Room - 3B-24/024-A   Hospital Day - 3  Consulting - Sidney Hernandez MD Primary Care Physician - Kellie Oliva   Chief Complaint/ reason for consult   Pneumonia   Active Hospital Problem List      Active Hospital Problems    Diagnosis Date Noted    Acute renal failure, not POA [N17.9] 2017    Hypercalcemia, not POA [E83.52] 2017    Hypertensive urgency [I16.0] 09/15/2017    Pleural effusion, right [J90] 09/15/2017    Acute hepatic encephalopathy [K72.00] 09/15/2017    Thrombocytopenia ([address] [D69.6] 09/15/2017    Microcytic anemia [D50.9] 09/15/2017    Hyperbilirubinemia [E80.6] 09/15/2017    Hypokalemia [E87.6] 09/15/2017    Urinary retention [R33.9]     Primary biliary cirrhosis ([address] [K74.3] 04/15/2014    History of atrial fibrillation [Z86.79] 04/10/2014    Essential hypertension [I10]     Cirrhosis of liver with ascites ([address] [K74.60]     Hypothyroidism [E03.9]      HPI   Lissett Mir is a 68 y.o. female admitted for hypertensive emergency, CASTANON, urethrovaginal fistula. HPI limited as patient is confused, information obtained by chart review. Patient was a transfer from [facility]. Was on labetalol gtt for HTN, now off. Patient is confused with high ammonia level on lactulose. GI assisting with management of hepatic encephalopathy, Cirrhosis. Developed right sided pleural effusion likely due to cirrhosis, CT of chest obtained for further evaluation shows bilateral infiltrates. Pulmonary consulted for further management. Chart states is current every day tobacco smoker. No history of COPD, pneumonia, or asthma listed.     Past Medical History         Diagnosis Date    Anemia     Arthritis     Blood transfusion reaction     Cancer ([address]     colon in metoprolol tartrate  50 mg Oral BID    ursodiol  500 mg Oral BID    nicotine  1 patch Transdermal Daily    phosphorus replacement protocol   Other RX Placeholder    levothyroxine  62.5 mcg Intravenous Daily    And    sodium chloride (PF)  5 mL Intravenous Daily    lactulose  20 g Oral TID    sodium chloride flush  10 mL Intravenous 2 times per day    pantoprazole  40 mg Intravenous BID     acetaminophen, magnesium hydroxide, ondansetron, potassium chloride, magnesium sulfate, labetalol, sodium chloride flush  IV Drips/Infusions   sodium chloride 100 mL/hr at 09/18/17 0850     Vitals    Vitals    height is 5' 1\" (1.549 m) and weight is 143 lb 4.8 oz (65 kg). Her axillary temperature is 97 °F (36.1 °C). Her blood pressure is 138/63 and her pulse is 63. Her respiration is 16 and oxygen saturation is 94%. I/O    Intake/Output Summary (Last 24 hours) at 09/18/17 1351  Last data filed at 09/18/17 0654   Gross per 24 hour   Intake          3601.03 ml   Output              400 ml   Net          3201.03 ml     Patient Vitals for the past 96 hrs (Last 3 readings):   Weight   09/18/17 0340 143 lb 4.8 oz (65 kg)   09/17/17 0359 147 lb 12.8 oz (67 kg)   09/16/17 0423 141 lb 14.4 oz (64.4 kg)     Exam   General Appearance  Awake in no acute distress on room air  HEENT - Normal, Head is normocephalic, atraumatic. EOMI, PERRL  Neck - Supple, symmetrical, trachea midline and Soft, trachea midline and straight  Lungs - no wheezes, rales or rhonchi, aeration good  Cardiovascular - Heart sounds are normal.  Regular rhythm normal rate without murmur, gallop or rub.   Abdomen - Soft, nontender, nondistended, no masses or organomegaly  Neurologic  - alert to name only, thinks year is 1977  Skin -  Jaundiced No bruising or bleeding, warm and dry   Extremities - No cyanosis, clubbing or edema  Labs   ABG  Lab Results   Component Value Date    PH 7.48 09/03/2013    PO2 64 09/03/2013    PCO2 36 09/03/2013    HCO3 27 09/03/2013 O2SAT 94 09/03/2013     No results found for: WYATT Alvarez  CBC  Recent Labs      09/16/17   0508  09/17/17   0412 09/18/17   0927   WBC  4.3*  5.1  6.5   RBC  3.69*  3.57*  3.93*   HGB  9.2*  9.2*  9.9*   HCT  28.7*  28.0*  30.9*   MCV  78.0*  78.6*  78.5*   MCH  25.1*  25.8*  25.2*   MCHC  32.2*  32.8*  32.1*   RDW  21.9*  22.4*  22.1*   PLT  60*  56*  55*   MPV  9.1  8.9  9.0      BMP  Recent Labs      09/16/17   0508  09/17/17   0412 09/17/17   1645  09/18/17   0927   NA  147*  144  144  140   K  3.6  3.9  5.0  4.3   CL  109  108  107  107   CO2  26  25  22*  21*   BUN  43*  49*  50*  53*   CREATININE  1.3*  1.6*  1.5*  1.7*   GLUCOSE  155*  118*  104  102   MG  1.8  1.9   --   1.9   PHOS  2.9  3.0   --   3.0   CALCIUM  10.6*  11.5*  11.9*  11.5*     LFT  Recent Labs      09/16/17   0508 09/17/17 0412 09/18/17   0927   AST  15  18  24   ALT  14  13  15   BILITOT  2.2*  1.6*  1.8*   ALKPHOS  180*  181*  192*     TROP  Lab Results   Component Value Date    TROPONINT < 0.010 07/02/2015    TROPONINT < 0.010 04/11/2014    TROPONINT < 0.010 04/11/2014     BNP  No results for input(s): BNP in the last 72 hours. Lactic Acid  No results for input(s): LACTA in the last 72 hours. INR  No results for input(s): INR, PROTIME in the last 72 hours. PTT  No results for input(s): APTT in the last 72 hours. Glucose  No results for input(s): POCGLU in the last 72 hours. UA No results for input(s): SPECGRAV, PHUR, COLORU, CLARITYU, MUCUS, PROTEINU, BLOODU, RBCUA, WBCUA, BACTERIA, NITRU, GLUCOSEU, BILIRUBINUR, UROBILINOGEN, KETUA, LABCAST, LABCASTTY, AMORPHOS in the last 72 hours. Invalid input(s): CRYSTALS.   PFTs   None   Echo    9/15/17  Summary   Left ventricle size is normal.   Mildly increased left ventricle wall thickness.   Mild concentric left ventricular hypertrophy.   Systolic function was normal.   There were no regional wall motion abnormalities.   Ejection fraction is visually

## 2017-09-18 NOTE — PROGRESS NOTES
Hospital Follow Up Note  JAQUAN Mcghee 1940 9/18/2017 8:25 AM  S   In bed. NGT in place, not taking orals well due to refusing. Confusion is improving         O.    Vitals:    09/18/17 0340   BP: (!) 143/65   Pulse: 62   Resp: 16   Temp: 96.4 °F (35.8 °C)   SpO2: 94%            Awake, confused          Heart  RRR         Lungs CTAB          ABD Round/S/NT/ND/+BS         Ext No LLE     A. 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 Cirrhosis, acutely decompensated  NGT   Cholestasis without Jaundice  Hepatic encephalopathy  Lactulose   Actigall     Ascites - trace only, too little to tap   Venous insufficiency  Hypoalbuminemia  Elevated alk phos  Pancytopenia                                                Anemia, micro - AR by labs   Thrombocytopenia  Elevated INR     Infection? - possible acalculous cholecystitis on U/S  Cipro/flagyl 9-15-17    AFP 2.2     Tumor? - imaging without mass   GI bleeding? - no signs of hemorrhage  Thrombosis? - no, dopplers normal  Alcoholic hepatitis? - no signs  Med Noncompliance? - probable  Diet Noncompliance? - no  Sedatives? - no       Hypertension   Hypothyroidism         P.  Continue lactulose   Continue Actigall   Continue Cipro and Flagyl for possible acalculous cholecystitis per US      When taking PO medications then may pull NGT   Begin oral iron supplementation when taking PO meds well   Follow     A&P discussed with Dr Felicity Ross, CNP

## 2017-09-18 NOTE — FLOWSHEET NOTE
Consult Note for Palliative Care  Patient Name:Maame Huber  YOB: 1940  Age:  68 y.o. Gender: female  Principal Problem:    Acute hepatic encephalopathy  Active Problems:    Essential hypertension    Cirrhosis of liver with ascites (HCC)    Hypothyroidism    History of atrial fibrillation    Primary biliary cirrhosis (HCC)    Hypertensive urgency    Pleural effusion, right    Thrombocytopenia (HCC)    Microcytic anemia    Hyperbilirubinemia    Hypokalemia    Urinary retention    Hypercalcemia, not POA    Acute renal failure, not POA      Consultation Request By: Palliative nurse. Code Status:Full Code   Advanced Care Planning:   Healthcare Directive: Yes, patient has an advance directive for healthcare treatment  Type of Healthcare Directive: Living will, Durable power of  for health care, Copy in Chart: No, copy requested from family  Healthcare Agent Appointed: [id number] Agent's Name: Kady Griffin- daughter     Lizzeth Bridges- son Healthcare Agent's Phone Number: [phone]      [phone]    Contact Numbers: [phone] (home)   Next of Kin: Extended Emergency Contact Information  Primary Emergency Contact: Mary Kate Carlos [address] Phone: [phone]  Relation: Child  Secondary Emergency Contact: Kady Griffin  Address: [place]  [phone]           [address] Phone: [phone]  Relation: Child    Restoration Affiliation/:     Subjective:  Patient is in bed and requested coffee of this . She was under her blanket and seemed to be called. We talked about her needs. She welcomed prayer and seemed more relaxed after the prayer. Her nurse let me know that family does come in the evening. She seemed confused to me and the nurse confirmed this. No family was in the room at this time.      Objective:  Assessment: (P) Coping (confused and cold)                   Assessment:  Type: (P) Initial

## 2017-09-18 NOTE — CONSULTS
female with history of hepatic cirrhosis, primary hypertension, anemia of chronic disease, who was admitted through the emergency department after the patient presented there with mental confusion. She was found to have a profoundly elevated ammonia level. She was admitted for evaluation and management. Ammonia level is back to normal now. Serum calcium has also been high. Renal function progressively has been worsening. As a result I was asked to see her. She remains confused. Past Medical History:   Diagnosis Date    Anemia     Arthritis     Blood transfusion reaction     Cancer [address]     colon in polyps    Cirrhosis ([facility]     biliary    Heart murmur     Hernia     Hypertension     Thyroid disease     Unspecified diseases of blood and blood-forming organs     anemia       Past Surgical History:   Procedure Laterality Date    CATARACT REMOVAL  1995    jolene     COLON SURGERY  July 2002    resection Dr. Guanako Chacon  2012    Dr. Conrado Kenny  as a child   [id number] [id number] TUMOR REMOVAL  2013    of stomach 2013 Dr. Ulloa   3 2013    Dr. Kaleb Rausch       Family History   Problem Relation Age of Onset    Heart Disease Mother     Diabetes Mother     Arthritis Mother     High Blood Pressure Mother     Prostate Cancer Father     Arthritis Father     High Blood Pressure Father     Diabetes Sister     Cancer Sister      pancreatic    Heart Disease Maternal Grandfather     Cancer Sister      pancreatic    Heart Disease Sister     Diabetes Sister     High Blood Pressure Sister         reports that she has been smoking Cigarettes. She has a 10.50 pack-year smoking history. She has never used smokeless tobacco. She reports that she does not drink alcohol or use illicit drugs. Allergies:  Review of patient's allergies indicates no known allergies.     Current Medications:      piperacillin-tazobactam (ZOSYN) 3.375 g in acute distress. Vitals:   Vitals:    09/18/17 1534   BP: 132/62   Pulse: 56   Resp: 16   Temp: 97.5 °F (36.4 °C)   SpO2: 93%       Skin: no rash, turgor wnl  Heent:Pupils are reactive to light and accommodation. Throat is clear. Oral mucosa is dry. Neck: no bruits or jvd noted  Cardiovascular:  S1, S2 without m/r/g  Respiratory: Clear to auscultation without any wheezes, rhonchi or rales  Abdomen:  Abdomen is mildly distended. Bowel sounds. No palpable mass or tenderness to palpation. Ext: No lower extremity edema  Psychiatric: Deferred  Musculoskeletal:  intact  CNS: Awake and alert. Orientation is zero. Speech however is normal.  She has poor motor strength.   Data:   Labs:  Lab Results   Component Value Date     09/18/2017     09/17/2017     09/17/2017    K 4.3 09/18/2017    K 5.0 09/17/2017    K 3.9 09/17/2017     09/18/2017    CO2 21 (L) 09/18/2017    CO2 22 (L) 09/17/2017    CO2 25 09/17/2017    CREATININE 1.7 (H) 09/18/2017    CREATININE 1.5 (H) 09/17/2017    CREATININE 1.6 (H) 09/17/2017    BUN 53 (H) 09/18/2017    BUN 50 (H) 09/17/2017    BUN 49 (H) 09/17/2017    GLUCOSE 102 09/18/2017    GLUCOSE 104 09/17/2017    GLUCOSE 118 (H) 09/17/2017    PHOS 3.0 09/18/2017    PHOS 3.0 09/17/2017    PHOS 2.9 09/16/2017    WBC 6.5 09/18/2017    WBC 5.1 09/17/2017    WBC 4.3 (L) 09/16/2017    HGB 9.9 (L) 09/18/2017    HGB 9.2 (L) 09/17/2017    HGB 9.2 (L) 09/16/2017    HCT 30.9 (L) 09/18/2017    HCT 28.0 (L) 09/17/2017    HCT 28.7 (L) 09/16/2017    MCV 78.5 (L) 09/18/2017    PLT 55 (L) 09/18/2017     Labs reviewed    Imaging:  CXR results: Diagnostic images reports reviewed        Thank you Dr. Miguel Shin for allowing [facility]       Electronically signed by Boris Rocha MD on 9/18/2017 at 5:01 PM

## 2017-09-19 ENCOUNTER — APPOINTMENT (OUTPATIENT)
Dept: GENERAL RADIOLOGY | Age: 77
DRG: 441 | End: 2017-09-19
Attending: ANESTHESIOLOGY
Payer: MEDICARE

## 2017-09-19 LAB
ALBUMIN SERPL-MCNC: 2.3 G/DL (ref 3.5–5.1)
ALP BLD-CCNC: 180 U/L (ref 38–126)
ALT SERPL-CCNC: 17 U/L (ref 11–66)
AMMONIA: 26 UMOL/L (ref 11–60)
ANION GAP SERPL CALCULATED.3IONS-SCNC: 20 MEQ/L (ref 8–16)
ANISOCYTOSIS: ABNORMAL
AST SERPL-CCNC: 30 U/L (ref 5–40)
BASOPHILS # BLD: 0 %
BASOPHILS ABSOLUTE: 0 THOU/MM3 (ref 0–0.1)
BILIRUB SERPL-MCNC: 2 MG/DL (ref 0.3–1.2)
BUN BLDV-MCNC: 51 MG/DL (ref 7–22)
CALCIUM IONIZED: 1.51 MMOL/L (ref 1.12–1.32)
CALCIUM SERPL-MCNC: 10.8 MG/DL (ref 8.5–10.5)
CALCIUM URINE: 122 MG/24HR (ref 100–240)
CALCIUM URINE: 16.1 MG/DL
CHLORIDE BLD-SCNC: 105 MEQ/L (ref 98–111)
CO2: 20 MEQ/L (ref 23–33)
CREAT SERPL-MCNC: 1.8 MG/DL (ref 0.4–1.2)
CREATININE URINE: 0.4 GM/24HR
CREATININE URINE: 50.2 MG/DL
EOSINOPHIL # BLD: 0.1 %
EOSINOPHILS ABSOLUTE: 0 THOU/MM3 (ref 0–0.4)
GFR SERPL CREATININE-BSD FRML MDRD: 27 ML/MIN/1.73M2
GLUCOSE BLD-MCNC: 111 MG/DL (ref 70–108)
HCT VFR BLD CALC: 30.9 % (ref 37–47)
HEMOGLOBIN: 9.8 GM/DL (ref 12–16)
HOURS COLLECTED: 24 HRS
HYPOCHROMIA: ABNORMAL
LYMPHOCYTES # BLD: 4.1 %
LYMPHOCYTES ABSOLUTE: 0.3 THOU/MM3 (ref 1–4.8)
MAGNESIUM: 1.9 MG/DL (ref 1.6–2.4)
MCH RBC QN AUTO: 25.2 PG (ref 27–31)
MCHC RBC AUTO-ENTMCNC: 31.9 GM/DL (ref 33–37)
MCV RBC AUTO: 79 FL (ref 81–99)
MICROCYTES: ABNORMAL
MONOCYTES # BLD: 13 %
MONOCYTES ABSOLUTE: 1 THOU/MM3 (ref 0.4–1.3)
NUCLEATED RED BLOOD CELLS: 0 /100 WBC
PDW BLD-RTO: 20.8 % (ref 11.5–14.5)
PHOSPHORUS: 3.2 MG/DL (ref 2.4–4.7)
PLATELET # BLD: 58 THOU/MM3 (ref 130–400)
PMV BLD AUTO: 9.4 MCM (ref 7.4–10.4)
POTASSIUM SERPL-SCNC: 4.2 MEQ/L (ref 3.5–5.2)
RBC # BLD: 3.91 MILL/MM3 (ref 4.2–5.4)
RBC # BLD: NORMAL 10*6/UL
SEG NEUTROPHILS: 82.8 %
SEGMENTED NEUTROPHILS ABSOLUTE COUNT: 6.6 THOU/MM3 (ref 1.8–7.7)
SODIUM BLD-SCNC: 145 MEQ/L (ref 135–145)
SODIUM URINE: 18 MEQ/24HR (ref 75–200)
SODIUM URINE: 24 MEQ/L
T4 FREE: 1.12 NG/DL (ref 0.93–1.76)
TOTAL PROTEIN: 5.4 G/DL (ref 6.1–8)
TSH SERPL DL<=0.05 MIU/L-ACNC: 2.41 UIU/ML (ref 0.4–4.2)
URINE VOLUME MEASURE: 760 ML
URINE VOLUME, 24 HOUR: 760 ML
URINE VOLUME, 24 HOUR: 760 ML
WBC # BLD: 8 THOU/MM3 (ref 4.8–10.8)

## 2017-09-19 PROCEDURE — 92610 EVALUATE SWALLOWING FUNCTION: CPT

## 2017-09-19 PROCEDURE — G8978 MOBILITY CURRENT STATUS: HCPCS

## 2017-09-19 PROCEDURE — 2580000003 HC RX 258: Performed by: NURSE PRACTITIONER

## 2017-09-19 PROCEDURE — G8979 MOBILITY GOAL STATUS: HCPCS

## 2017-09-19 PROCEDURE — 6360000002 HC RX W HCPCS: Performed by: INTERNAL MEDICINE

## 2017-09-19 PROCEDURE — 83735 ASSAY OF MAGNESIUM: CPT

## 2017-09-19 PROCEDURE — 6370000000 HC RX 637 (ALT 250 FOR IP): Performed by: NURSE PRACTITIONER

## 2017-09-19 PROCEDURE — 2140000000 HC CCU INTERMEDIATE R&B

## 2017-09-19 PROCEDURE — 71020 XR CHEST STANDARD TWO VW: CPT

## 2017-09-19 PROCEDURE — 6370000000 HC RX 637 (ALT 250 FOR IP): Performed by: INTERNAL MEDICINE

## 2017-09-19 PROCEDURE — 36415 COLL VENOUS BLD VENIPUNCTURE: CPT

## 2017-09-19 PROCEDURE — 84300 ASSAY OF URINE SODIUM: CPT

## 2017-09-19 PROCEDURE — 99232 SBSQ HOSP IP/OBS MODERATE 35: CPT | Performed by: NURSE PRACTITIONER

## 2017-09-19 PROCEDURE — 99232 SBSQ HOSP IP/OBS MODERATE 35: CPT | Performed by: INTERNAL MEDICINE

## 2017-09-19 PROCEDURE — 80050 GENERAL HEALTH PANEL: CPT

## 2017-09-19 PROCEDURE — 2580000003 HC RX 258: Performed by: INTERNAL MEDICINE

## 2017-09-19 PROCEDURE — 82140 ASSAY OF AMMONIA: CPT

## 2017-09-19 PROCEDURE — 97530 THERAPEUTIC ACTIVITIES: CPT

## 2017-09-19 PROCEDURE — 82330 ASSAY OF CALCIUM: CPT

## 2017-09-19 PROCEDURE — 6370000000 HC RX 637 (ALT 250 FOR IP): Performed by: PHARMACIST

## 2017-09-19 PROCEDURE — 2500000003 HC RX 250 WO HCPCS: Performed by: INTERNAL MEDICINE

## 2017-09-19 PROCEDURE — 82570 ASSAY OF URINE CREATININE: CPT

## 2017-09-19 PROCEDURE — 97162 PT EVAL MOD COMPLEX 30 MIN: CPT

## 2017-09-19 PROCEDURE — 83883 ASSAY NEPHELOMETRY NOT SPEC: CPT

## 2017-09-19 PROCEDURE — 84100 ASSAY OF PHOSPHORUS: CPT

## 2017-09-19 PROCEDURE — 84439 ASSAY OF FREE THYROXINE: CPT

## 2017-09-19 RX ORDER — URSODIOL 300 MG/1
300 CAPSULE ORAL 2 TIMES DAILY
Status: DISCONTINUED | OUTPATIENT
Start: 2017-09-19 | End: 2017-10-05 | Stop reason: HOSPADM

## 2017-09-19 RX ORDER — CALCITONIN SALMON 200 [USP'U]/ML
100 INJECTION, SOLUTION INTRAMUSCULAR; SUBCUTANEOUS ONCE
Status: COMPLETED | OUTPATIENT
Start: 2017-09-19 | End: 2017-09-19

## 2017-09-19 RX ORDER — FERROUS SULFATE 325(65) MG
325 TABLET ORAL 2 TIMES DAILY WITH MEALS
Status: DISCONTINUED | OUTPATIENT
Start: 2017-09-19 | End: 2017-09-24

## 2017-09-19 RX ORDER — LEVOTHYROXINE SODIUM 112 UG/1
112 TABLET ORAL DAILY
Status: DISCONTINUED | OUTPATIENT
Start: 2017-09-20 | End: 2017-10-05 | Stop reason: HOSPADM

## 2017-09-19 RX ORDER — METHYLPREDNISOLONE SODIUM SUCCINATE 125 MG/2ML
250 INJECTION, POWDER, LYOPHILIZED, FOR SOLUTION INTRAMUSCULAR; INTRAVENOUS DAILY
Status: DISCONTINUED | OUTPATIENT
Start: 2017-09-19 | End: 2017-09-19 | Stop reason: SDUPTHER

## 2017-09-19 RX ADMIN — CALCITONIN SALMON 100 UNITS: 200 INJECTION, SOLUTION INTRAMUSCULAR; SUBCUTANEOUS at 12:56

## 2017-09-19 RX ADMIN — LACTULOSE 20 G: 20 SOLUTION ORAL at 12:56

## 2017-09-19 RX ADMIN — METOPROLOL TARTRATE 50 MG: 50 TABLET, FILM COATED ORAL at 19:56

## 2017-09-19 RX ADMIN — CIPROFLOXACIN 500 MG: 500 TABLET, FILM COATED ORAL at 19:56

## 2017-09-19 RX ADMIN — HYDRALAZINE HYDROCHLORIDE 25 MG: 25 TABLET, FILM COATED ORAL at 12:55

## 2017-09-19 RX ADMIN — PIPERACILLIN SODIUM,TAZOBACTAM SODIUM 3.38 G: 3; .375 INJECTION, POWDER, FOR SOLUTION INTRAVENOUS at 09:43

## 2017-09-19 RX ADMIN — PIPERACILLIN SODIUM,TAZOBACTAM SODIUM 3.38 G: 3; .375 INJECTION, POWDER, FOR SOLUTION INTRAVENOUS at 16:35

## 2017-09-19 RX ADMIN — Medication 325 MG: at 16:35

## 2017-09-19 RX ADMIN — FAMOTIDINE 20 MG: 20 TABLET, FILM COATED ORAL at 07:43

## 2017-09-19 RX ADMIN — LACTULOSE 20 G: 20 SOLUTION ORAL at 07:43

## 2017-09-19 RX ADMIN — METRONIDAZOLE 500 MG: 500 TABLET, FILM COATED ORAL at 12:55

## 2017-09-19 RX ADMIN — AMLODIPINE BESYLATE 10 MG: 10 TABLET ORAL at 07:43

## 2017-09-19 RX ADMIN — LEVOTHYROXINE SODIUM ANHYDROUS 62.5 MCG: 100 INJECTION, POWDER, LYOPHILIZED, FOR SOLUTION INTRAVENOUS at 07:43

## 2017-09-19 RX ADMIN — CINACALCET HYDROCHLORIDE 30 MG: 30 TABLET, COATED ORAL at 07:43

## 2017-09-19 RX ADMIN — METOPROLOL TARTRATE 50 MG: 50 TABLET, FILM COATED ORAL at 07:43

## 2017-09-19 RX ADMIN — PIPERACILLIN SODIUM,TAZOBACTAM SODIUM 3.38 G: 3; .375 INJECTION, POWDER, FOR SOLUTION INTRAVENOUS at 01:50

## 2017-09-19 RX ADMIN — SODIUM CHLORIDE: 9 INJECTION, SOLUTION INTRAVENOUS at 09:44

## 2017-09-19 RX ADMIN — Medication 5 ML: at 07:44

## 2017-09-19 RX ADMIN — Medication 325 MG: at 12:55

## 2017-09-19 RX ADMIN — METRONIDAZOLE 500 MG: 500 TABLET, FILM COATED ORAL at 01:52

## 2017-09-19 RX ADMIN — SODIUM CHLORIDE 250 MG: 9 INJECTION, SOLUTION INTRAVENOUS at 12:56

## 2017-09-19 RX ADMIN — URSODIOL 300 MG: 300 CAPSULE ORAL at 19:56

## 2017-09-19 RX ADMIN — LACTULOSE 20 G: 20 SOLUTION ORAL at 19:55

## 2017-09-19 RX ADMIN — Medication 10 ML: at 07:45

## 2017-09-19 RX ADMIN — METRONIDAZOLE 500 MG: 500 TABLET, FILM COATED ORAL at 16:35

## 2017-09-19 RX ADMIN — METRONIDAZOLE 500 MG: 500 TABLET, FILM COATED ORAL at 07:45

## 2017-09-19 ASSESSMENT — PAIN SCALES - GENERAL
PAINLEVEL_OUTOF10: 0

## 2017-09-19 NOTE — PROGRESS NOTES
Hospital Medicine Progress Note     Date:  9/19/2017    PCP: Jair Garces (Tel: [phone])    Date of Admission: 9/15/2017      Brief hospital course: Patient with history of CASTANON and cirrhosis, transferred with report of altered mental status, uncontrolled hypertension with SBP in the 200s. She has history of atrial fibrillation, not on anticoagulation due to thrombocytopenia. Patient accepted on transfer, seen by Dr. Raina Louie on 9/15 (per discussion with nursing staff) - please refer to Dr. Carlyn Klinefelter H&P for further details. Assessment:  Principal Problem:    Acute hepatic encephalopathy  Active Problems:    Hypertensive urgency    Pleural effusion    Cirrhosis of liver with ascites (HCC)    Essential hypertension    Hypothyroidism    History of atrial fibrillation    Primary biliary cirrhosis (HCC)    Thrombocytopenia (HCC)    Microcytic anemia    Hyperbilirubinemia    Hypokalemia    Urinary retention    Hypercalcemia, not POA    Acute renal failure, not POA    Pneumonia, organism unspecified    BEN ([facility] kidney injury) ([facility]    Dehydration    Acquired hypothyroidism    Hypovitaminosis D        Plan:  1. Hepatic encephalopathy. Elevated ammonia on admission; however, ammonia normal with lactulose. Confusion improving, albeit slowly. Hx of PBC, on ursodiol per hx of PBC. GI assisting with management. 2. Biliary sludge; cannot exclude acalculous cholecystitis. On cipro/flagyl per GI recommendations. 3. Bacterial pneumonia, aspiration suspected. CT-chest with findings concerning for aspiration pneumonia, likely POA. Cultures obtained on admission. On zosyn. 4. Right pleural effusion, small and likely parapneumonic. No significant ascites amenable to paracentesis on abdominal US to completely associate with hepatic hydrothorax. Needs repeat CXR in a few weeks. 5. Accelerated hypertension/hypertensive urgency. Was on labetalol infusion, which has been titrated off.  Continue PO BP meds as ordered. 6. Acute renal failure, not POA. Likely secondary to pre-renal azotemia vs hypercalcemia vs interstitial nephritis. Nephrology assisting with care - on IV solumedrol; calcitonin for management of hypercalcemia. 7. Hypercalcemia. PTH elevated, vitamin D low; likely primary hyperparathyroidism. Endocrinology on board - planning sestamibi scan later this week. 8. Hx of afib, not on anticoagulation per chronic thrombocytopenia and bleeding risk. 9. Urinary retention. Aguilar catheter had to be placed by urology; to follow up with urologist as outpatient. 10. Hypothyroidism. Continue synthroid. 11. Hypokalemia, resolved with replacement. Diet: DIET CARDIAC; Dietary Nutrition Supplements: Low Calorie High Protein Supplement    Code status: Full Code     ----------  Subjective  Denies any complaints. No chest pain or shortness of breath. Objective  Physical exam:  Vitals: BP (!) 148/67  Pulse 58  Temp 97.3 °F (36.3 °C) (Oral)   Resp 16  Ht 5' 1\" (1.549 m)  Wt 143 lb 4.8 oz (65 kg)  SpO2 96%  BMI 27.08 kg/m2  Gen: Not in distress. Awake. Remains confused. Head: Normocephalic. Atraumatic. Eyes: EOMI. Good acuity. ENT: Oral mucosa dry  Neck: No JVD. No obvious thyromegaly. CVS: Nml S1S2, no MRG, RRR  Pulmomary: Clear bilaterally. No crackles. No wheezes. Gastrointestinal: Soft, non-tender, nondistended  Musculoskeletal: Trace pedal edema. Warm  Neuro: No focal deficit. Moves extremity spontaneously. Psychiatry: Appropriate affect. Not agitated. Skin: Warm, dry with normal turgor. No rash    24HR INTAKE/OUTPUT:      Intake/Output Summary (Last 24 hours) at 09/19/17 1349  Last data filed at 09/19/17 0545   Gross per 24 hour   Intake          2141.43 ml   Output           1269.2 ml   Net           872.23 ml     I/O last 3 completed shifts: In: 2531.4 [P.O.:850;  I.V.:1681.4]  Out: 1269.2 [Urine:1269.2]       Meds:    [START ON 9/20/2017] levothyroxine  112 mcg Oral Daily    ferrous

## 2017-09-19 NOTE — PROGRESS NOTES
AROM : WFL    Strength RLE: WFL  Comment: >/=3/5, pt not following commands for MMT    Strength LLE: WFL  Comment: >/=3/5, pt not following commands for MMT         Balance  Sitting - Static: Fair, +  Sitting - Dynamic: Fair  Standing - Static: Fair  Standing - Dynamic: Fair, -    Supine to Sit: Minimal assistance (cues for direction and to stay focused to task)    Transfers  Sit to Stand: Minimal Assistance (bedside chair, cues for hand placement, pt difficulty following one step commands)  Stand to sit: Contact guard assistance       Ambulation 1  Surface: level tile  Device:  (IV pole for 1 UE support)  Assistance: Minimal assistance  Quality of Gait: multiple cues for direction, pt took a couple of steps then stated she needed to stop secondary to eye shadow and pointed to floor, PT attempted to redirect pt stating there was nothing on floor and after around 2 min pt started to sit so needed guided back to chair for safety  Distance: 3'x1          Exercises:  Comments: none, pt not following commands for therex          Activity Tolerance:  Activity Tolerance: Patient limited by cognitive status    Treatment Initiated: static and dyn sitting on edge of chair, pt reaching OBOS to bed for blanket, pt redirected multiple times to task however not following simple one step  Commands, pt hallucinating and confused, SBA to Ingris for safety with balance    Assessment:   Body structures, Functions, Activity limitations: Decreased functional mobility , Decreased balance, Decreased safe awareness, Decreased cognition, Decreased endurance, Decreased strength  Assessment: pt limited by confusion, hallucinating and safety concerns for mobility, recommend cont PT to improve pt strength/endurance/balance for increased functional mobility  Prognosis: Fair    Clinical Presentation: Moderate - Evolving with Changing Characteristics: pt with a lot of safety concerns secondary to confusion and hallucinations and increased need for Walking and Moving Around Current Status (): At least 40 percent but less than 60 percent impaired, limited or restricted  Mobility: Walking and Moving Around Goal Status ():  At least 1 percent but less than 20 percent impaired, limited or restricted

## 2017-09-19 NOTE — PLAN OF CARE
Problem: Falls - Risk of  Goal: Absence of falls  Outcome: Ongoing  No falls this shift. Patient up with 2 assist to the chair. Problem: Nutrition  Goal: Optimal nutrition therapy  Outcome: Ongoing  Patient able to tolerate PO diet. Problem: Discharge Planning:  Goal: Participates in care planning  Participates in care planning   Outcome: Ongoing    Problem: Airway Clearance - Ineffective:  Goal: Ability to maintain a clear airway will improve  Ability to maintain a clear airway will improve   Outcome: Ongoing  Lungs sounds improving. Problem: Musculor/Skeletal Functional Status  Goal: Highest potential functional level  Outcome: Ongoing  Patient very confused and does not follow all commands when getting up. Comments:   Care plan reviewed with patient and family. Patient and family verbalize understanding of the plan of care and contribute to goal setting.

## 2017-09-19 NOTE — PROGRESS NOTES
Renal Progress Note    Assessment and Plan: 1. Acute kidney injury mostly stable and likely due to combination of hypercalcemia and interstitial nephritis drug-induced  2. Essential hypertension  3. Drug induced interstitial nephritis  4. Hypercalcemia improved  5. Mental confusion multifactorial  6. Hepatitic cirrhosis  7. Pancytopenia  8. Metabolic acidosis  PLAN:   labs reviewed  Medications reviewed  Calcitonin 100 unit subcu once daily  Solu-Medrol  250 mg intravenously once a day for 3 days and  BMP the morning  If no improvement in serum bicarbonate level, I will change the intravenous fluid to bicarbonate infusion in am  We'll follow    Patient Active Problem List:     GIST (gastrointestinal stromal tumor), non-malignant     Essential hypertension     Cirrhosis of liver with ascites (HCC)     Cancer (HCC)     Hypothyroidism     Dizzy spells     Fever     Epigastric pain     History of atrial fibrillation     Primary biliary cirrhosis (HCC)     Hypertensive urgency     Pleural effusion     Acute hepatic encephalopathy     Thrombocytopenia (HCC)     Microcytic anemia     Hyperbilirubinemia     Hypokalemia     Urinary retention     Hypercalcemia, not POA     Acute renal failure, not POA     Pneumonia, organism unspecified     BEN (acute kidney injury) ([facility] [address]     Dehydration     Acquired hypothyroidism     Hypovitaminosis D      Subjective:   Admit Date: 9/15/2017    Interval History: Late entry  Seeing for acute kidney injury  Very awake this morning when I saw her. Still confused. Updated by the staff. Blood pressure is okay.   Urine output is good        Medications:   Scheduled Meds:   [START ON 9/20/2017] levothyroxine  112 mcg Oral Daily    ferrous sulfate  325 mg Oral BID WC    calcitonin  100 Units Intramuscular Once    methylPREDNISolone  250 mg Intravenous Daily    piperacillin-tazobactam  3.375 g Intravenous Q8H    hydrALAZINE  25 mg Oral 3 times per day    cinacalcet  30 mg Oral

## 2017-09-19 NOTE — PROGRESS NOTES
Pt up in chair in room, alert to self but is otherwise confused. Call placed to pt's son Oh Thompson who has a lot of questions regarding his mom's plan of care. He states both he and sister share Power of [place] but that she works until [employer] everyday. Suggested that he speak with his sister and see if they can coordinate a time to come in tomorrow to meet with Palliative Care and discuss concerns. He states he will talk with her tonight. He also states that is mom would \"hate him\" if he put her in an ECF but does not know if he could take her home with him as his ex-wife is now living with him and is a paranoid schizophrenic who is also off her medication. Emotional support offered to son as he sounds very overwhelmed over the phone. We discussed briefly what they would want done if her heart would quit or she would stop breathing suddenly as she is currently a Full Code and he replied \"man I just don't even know. \" Again I encouraged a family meeting tomorrow if possible with palliative care and he did agree as long as his sister could make it too. I did try to call the sister also, but did not get an answer. Plan is for Palliative Care to call Oh Thompson in the morning after he talks to his sister tonight, and try to set up a family meeting. Floor RN updated on case, and Palliative Care will continue to see pt.

## 2017-09-19 NOTE — PROGRESS NOTES
edema. Acalculous cholecystitis is a possibility. **This report has been created using voice recognition software. It may contain minor errors which are inherent in voice recognition technology. ** Final report electronically signed by Dr. Sharita Lemus on 9/15/2017 7:23 PM    Us Gallbladder Ruq    Result Date: 9/15/2017  PROCEDURE: US GALLBLADDER RUQ, US LIVER CLINICAL INFORMATION: CIRRHOSIS, HEPATITIS,  . COMPARISON: No prior study. TECHNIQUE: Grayscale and color Doppler ultrasound FINDINGS: Exam is somewhat limited due to patient condition. Liver Liver is somewhat decreased in size. No focal mass or intrahepatic ductal dilatation is seen. There are multiple tortuous vessels at the new hepatis. There is possible recannulated the umbilicus vein. There are multiple hypoechoic masses at the new hepatis indicating periportal pericaval adenopathy. Some of these are as large as 9.7 cm. The gallbladder shows no evidence of stones there is gallbladder wall thickening and pericholecystic edema and sludge within the gallbladder. Common bile duct is not enlarged. 1. Cirrhotic liver with possible recannulated emboli: Vein. 2. Marked periportal pericaval adenopathy. Follow-up with CT scan is recommended. 3. Gallbladder sludge with gallbladder wall thickening and pericholecystic edema. Acalculous cholecystitis is a possibility. **This report has been created using voice recognition software. It may contain minor errors which are inherent in voice recognition technology. ** Final report electronically signed by Dr. Sharita Lemus on 9/15/2017 7:23 PM    Xr Chest Portable    Result Date: 9/15/2017  PROCEDURE: XR CHEST PORTABLE CLINICAL INFORMATION: NG tube placement,  . COMPARISON: 9/15/2017 at 1740 TECHNIQUE: Portable supine FINDINGS: NG tube is been advanced and is well into the stomach. Distal tip is not included on the image. There are no other changes.      NG tube is well into the stomach **This report has been created Assessment / Plan:    1. Hypercalcemia with elevated PTH, suggesting possible primary hyperparathyroidism. Nephrology gave Calcitonin SQ and Cinacalcet 30 mg 9/18. Calcium this Am 10.8. Vitamin D 1.25 dihydroxy, 24 hour urine for calcium, creatinine and sodium pending. Sestamibi scan scheduled for Thursday. 2. Hypothyrodisim. Now taking orals, transition IV Synthroid to PO.   3. Hypovitaminosis D. Vit D 10. No replacement due to #1. 4. Cirrhosis with hepatic encephalopathy. GI following. Lactulose. Ammonia level has improved. 5. Encephalopathy, multifactorial with #1/4/5  6. Right lower lobe pneumonia, Pulmonary following. 7. Right pleural effusion. 8. Acute kidney injury, nephrology consulted. Dispo: Await pending test. Change Synthroid to PO. Electronically signed by Ladi Dove CNP on 9/19/2017 at 11:05 AM  Rounding for Dr. Nate Baker, Endocrinologist.         Attending addendum:  I have reviewed NP note and relevant data in detail. I agree with findings and plans of care as documented in NP nurse note.

## 2017-09-19 NOTE — PROGRESS NOTES
[address]  OCCUPATIONAL THERAPY MISSED TREATMENT NOTE  ACUTE CARE    Date: 2017  Patient Name: Nicolás Whitfield        CSN: [id number]   : 1940  (68 y.o.)  Gender: female   Referring Practitioner: Dr. Louise Walsh MD  Diagnosis: Hypertensive Urgency         REASON FOR MISSED TREATMENT:  Missed Treat. Will see tomorrow.

## 2017-09-19 NOTE — PROGRESS NOTES
No history of COPD, pneumonia, or asthma listed. Events past 24 hours   Oxygenating well on room air  Seems little more confused today from yesterday  OOB to chair  Increased cough through night  AFVSS  Past Medical History         Diagnosis Date    Anemia     Arthritis     Blood transfusion reaction     Cancer (HCC)     colon in polyps    Cirrhosis (HCC)     biliary    Heart murmur     Hernia     Hypertension     Thyroid disease     Unspecified diseases of blood and blood-forming organs     anemia      Meds    Current Medications    [START ON 9/20/2017] levothyroxine  112 mcg Oral Daily    ferrous sulfate  325 mg Oral BID WC    calcitonin  100 Units Intramuscular Once    methylPREDNISolone (SOLU-MEDROL) IVPB  250 mg Intravenous Daily    piperacillin-tazobactam  3.375 g Intravenous Q8H    hydrALAZINE  25 mg Oral 3 times per day    cinacalcet  30 mg Oral Daily    famotidine  20 mg Oral Daily    ciprofloxacin  500 mg Oral Q24H    amLODIPine  10 mg Oral Daily    metroNIDAZOLE  500 mg Oral 4 times per day    metoprolol tartrate  50 mg Oral BID    ursodiol  500 mg Oral BID    nicotine  1 patch Transdermal Daily    phosphorus replacement protocol   Other RX Placeholder    lactulose  20 g Oral TID    sodium chloride flush  10 mL Intravenous 2 times per day     acetaminophen, magnesium hydroxide, ondansetron, potassium chloride, magnesium sulfate, labetalol, sodium chloride flush  IV Drips/Infusions   sodium chloride 125 mL/hr at 09/19/17 0944     Vitals    Vitals    height is 5' 1\" (1.549 m) and weight is 143 lb 4.8 oz (65 kg). Her oral temperature is 97.3 °F (36.3 °C). Her blood pressure is 148/67 (abnormal) and her pulse is 58. Her respiration is 16 and oxygen saturation is 96%.         I/O    Intake/Output Summary (Last 24 hours) at 09/19/17 1230  Last data filed at 09/19/17 0545   Gross per 24 hour   Intake          2141.43 ml   Output           1269.2 ml   Net           872.23 ml Patient Vitals for the past 96 hrs (Last 3 readings):   Weight   09/18/17 0340 143 lb 4.8 oz (65 kg)   09/17/17 0359 147 lb 12.8 oz (67 kg)   09/16/17 0423 141 lb 14.4 oz (64.4 kg)     Exam   General Appearance  Awake, no acute distress on room air, sitting up in recliner   HEENT - Normal, Head is normocephalic, atraumatic. PERRL, + scleral icterus   Neck - Supple, symmetrical, trachea midline and Soft, trachea midline and straight  Lungs - no wheezes, rales or rhonchi, aeration diminished   Cardiovascular - Heart sounds are normal.  Regular rhythm normal rate without murmur, gallop or rub.   Abdomen - Soft, nontender, nondistended, no masses or organomegaly  Neurologic -  Alert to name only,  There are no focal motor or sensory deficits  Skin - No bruising or bleeding  Extremities - No cyanosis, clubbing or edema    Labs   ABG  Lab Results   Component Value Date    PH 7.48 09/03/2013    PO2 64 09/03/2013    PCO2 36 09/03/2013    HCO3 27 09/03/2013    O2SAT 94 09/03/2013     No results found for: WYATT Gilliam  CBC  Recent Labs      09/17/17   0412  09/18/17   0927  09/19/17   0735   WBC  5.1  6.5  8.0   RBC  3.57*  3.93*  3.91*   HGB  9.2*  9.9*  9.8*   HCT  28.0*  30.9*  30.9*   MCV  78.6*  78.5*  79.0*   MCH  25.8*  25.2*  25.2*   MCHC  32.8*  32.1*  31.9*   RDW  22.4*  22.1*  20.8*   PLT  56*  55*  58*   MPV  8.9  9.0  9.4      BMP  Recent Labs      09/17/17   0412  09/17/17   1645  09/18/17   0927  09/19/17   0735   NA  144  144  140  145   K  3.9  5.0  4.3  4.2   CL  108  107  107  105   CO2  25  22*  21*  20*   BUN  49*  50*  53*  51*   CREATININE  1.6*  1.5*  1.7*  1.8*   GLUCOSE  118*  104  102  111*   MG  1.9   --   1.9  1.9   PHOS  3.0   --   3.0  3.2   CALCIUM  11.5*  11.9*  11.5*  10.8*     LFT  Recent Labs      09/17/17   0412  09/18/17   0927  09/19/17   0735   AST  18  24  30   ALT  13  15  17   BILITOT  1.6*  1.8*  2.0*   ALKPHOS  181*  192*  180*     TROP  Lab Results encephalopathy with elevated ammonia- improving-  Lactulose per GI  Hypercalcemia with elevated PTH- endocrinology following   Hypothyroidism- IV synthroid per Endocrinology   Recommendations   Continue Zosyn for treatment of pneumonia- started 9/18/17- recommend complete 7 days antibiotic therapy   Small effusion in right- continue to monitor, does not need thoracentesis at this time  CXR improving  Speech therapy consult for swallow eval completed- ok to proceed with regular diet with close monitoring   Supplemental O2 PRN to keep sats >90%  Nicotine patch for smoking cessation  DVT prophylaxis per primary     Case discussed with nurse and patient/family. Questions and concerns addressed. Meds and Orders reviewed. Electronically signed by     Kathe Cabrera CNP on 9/19/2017 at 12:30 PM      Improved CXR  Smaller effusions, no SOB  Coughing with eating, will recommend SLP eval  Complete ATBs for pneumonia  Pulmonary will s/o  Thank you    Patient seen and examined independently by me. Above discussed and I agree with Kathe Cabrera CNP note Also see my additional comments. Labs, cultures, and radiographs when available were reviewed. Changes were made in the orders as necessary. I discussed patient concerns with Maame's R.N. and instructions were given. Respiratory care issues addressed. Please see our orders for the updated patient care plan.     Electronically signed by     Lisandro Morrow MD on 9/19/2017 at 1:33 PM

## 2017-09-19 NOTE — CONSULTS
suicide ideation  Neurology: no syncope, no seizures, no numbness or tingling of hands, no numbness or tingling of feet, no paresis               PHYSICAL EXAM:  BP (!) 161/95  Pulse 62  Temp 98.8 °F (37.1 °C) (Axillary)   Resp 18  Ht 5' 1\" (1.549 m)  Wt 143 lb 4.8 oz (65 kg)  SpO2 95%  BMI 27.08 kg/m2  General:  Awake, alert, not in distress. Appears to be stated age. HEENT: Atraumatic, normocephalic. PERRLA. EOM intact. Pink conjunctiva, lips moist. Teeth in good repair. Anicteric sclera. Pink and moist oral mucosa. No lymphadenopathy. Trachea midline. Thyroid non palpable. Neck supple. No carotid bruit. No JVD. Chest: Bilateal air entry, Clear to auscultation, no wheezing, rhonchi or rales. Cardiovascular: RRR, R8U9, + murmur, click, rub or gallop. No lower extremity edema. Pedal and posterior tibialis 2+. Abdomen: Soft, non tender to palpation. Active bowel sounds x 4 quadrants. Musculoskeletal: passive and active ROM x 4 extremities. Integumentary: Pink, warm and dry. Free from rash or lesions. CNS: Oriented to person only. Cranial nerves 2-12 grossly intact. Speech clear. Normal sensation. Face symmetrical. No tremor. Psych: confused.       Review of Labs and Diagnostic Testing:    CBC:   Recent Labs      09/17/17   0412  09/18/17   0927  09/19/17   0735   WBC  5.1  6.5  8.0   HGB  9.2*  9.9*  9.8*   PLT  56*  55*  58*     BMP:    Recent Labs      09/17/17   1645  09/18/17   0927  09/19/17   0735   NA  144  140  145   K  5.0  4.3  4.2   CL  107  107  105   CO2  22*  21*  20*   BUN  50*  53*  51*   CREATININE  1.5*  1.7*  1.8*   GLUCOSE  104  102  111*     Calcium:  Recent Labs      09/19/17   0735   CALCIUM  10.8*       Magnesium:  Recent Labs      09/19/17   0735   MG  1.9     Phosphorus:  Recent Labs      09/19/17   0735   PHOS  3.2       Hepatic:   Recent Labs      09/19/17   0735   ALKPHOS  180*   ALT  17   AST  30   PROT  5.4*   BILITOT  2.0*   LABALBU  2.3*       ABGs:   Lab Results

## 2017-09-19 NOTE — PROGRESS NOTES
[address] [address]  SPEECH THERAPY  STRZ CCU-STEPDOWN 3B  Bedside Swallowing Evaluation      SLP Individual Minutes  Time In: 1024  Time Out: 5717  Minutes: 9          Date: 2017  Patient Name: Yoana Larkin      CSN: [id number]   : 1940  (68 y.o.)  Gender: female   Referring Physician:  Dr. Radha Reece    Diagnosis: Hypertensive urgency  Secondary Diagnosis:  Dysphagia  History of Present Illness/Injury: Patient admitted to [facility] with above dx. See physician H&P for full report. ST consulted d/t patient coughing with liquids. ST to complete swallow evaluation, r/o aspiration and determine goals and POC.     Past Medical History:   Diagnosis Date    Anemia     Arthritis     Blood transfusion reaction     Cancer ([facility] [address]     colon in polyps    Cirrhosis ([facility] [address]     biliary    Heart murmur     Hernia     Hypertension     Thyroid disease     Unspecified diseases of blood and blood-forming organs     anemia       Pain:  0/10    Current Diet: Regular with thin     Respiratory Status: [x] Independent     Behavioral Observation: [x] Alert  [x] Confused        [x] Limited Direction Following     ORAL MECHANISM EVALUATION:         Comments:  Facial / Labial [x]WFL [] Impaired []DNT    Lingual [x]WFL [] Impaired []DNT Small white patches   Dentition [x]WFL [] Impaired []DNT    Velum [x]WFL [] Impaired []DNT    Vocal Quality [x]WFL [] Impaired []DNT    Sensation [x]WFL [] Impaired []DNT    Cough [x]WFL [] Impaired []DNT    Other: []WFL [] Impaired []DNT    Other: []WFL [] Impaired []DNT        PATIENT WAS EVALUATED USING:  Thin, puree,  Hard solids     ORAL PHASE: [x] WFL  [] Impaired   [] Loss of bolus from lips [] Impaired AP movement [] Pocketing Left   [] Pocketing Right  [] Lingual Pumping  [] Impaired Mastication   [] Reduced Bolus Formation [] Impaired Oral Initiation    [] OTHER:    PHARYNGEAL PHASE: [x] WFL: Pharyngeal phase appears WFLs, but cannot completely rule out pharyngeal phase deficits from

## 2017-09-20 LAB
ALBUMIN SERPL-MCNC: 2.4 G/DL (ref 3.5–5.1)
ALP BLD-CCNC: 180 U/L (ref 38–126)
ALT SERPL-CCNC: 19 U/L (ref 11–66)
AMMONIA: 14 UMOL/L (ref 11–60)
ANION GAP SERPL CALCULATED.3IONS-SCNC: 16 MEQ/L (ref 8–16)
ANISOCYTOSIS: ABNORMAL
AST SERPL-CCNC: 36 U/L (ref 5–40)
BASOPHILS # BLD: 0.1 %
BASOPHILS ABSOLUTE: 0 THOU/MM3 (ref 0–0.1)
BILIRUB SERPL-MCNC: 2.2 MG/DL (ref 0.3–1.2)
BUN BLDV-MCNC: 53 MG/DL (ref 7–22)
CALCIUM SERPL-MCNC: 10.5 MG/DL (ref 8.5–10.5)
CHLORIDE BLD-SCNC: 106 MEQ/L (ref 98–111)
CO2: 19 MEQ/L (ref 23–33)
CREAT SERPL-MCNC: 2 MG/DL (ref 0.4–1.2)
CRENATED RBC'S: ABNORMAL
EOSINOPHIL # BLD: 0.2 %
EOSINOPHILS ABSOLUTE: 0 THOU/MM3 (ref 0–0.4)
GFR SERPL CREATININE-BSD FRML MDRD: 24 ML/MIN/1.73M2
GLUCOSE BLD-MCNC: 121 MG/DL (ref 70–108)
HCT VFR BLD CALC: 31.6 % (ref 37–47)
HEMOGLOBIN: 10.2 GM/DL (ref 12–16)
HYPOCHROMIA: ABNORMAL
LYMPHOCYTES # BLD: 4.7 %
LYMPHOCYTES ABSOLUTE: 0.4 THOU/MM3 (ref 1–4.8)
MAGNESIUM: 2 MG/DL (ref 1.6–2.4)
MCH RBC QN AUTO: 25.3 PG (ref 27–31)
MCHC RBC AUTO-ENTMCNC: 32.3 GM/DL (ref 33–37)
MCV RBC AUTO: 78.4 FL (ref 81–99)
MICROCYTES: ABNORMAL
MONOCYTES # BLD: 9.4 %
MONOCYTES ABSOLUTE: 0.7 THOU/MM3 (ref 0.4–1.3)
NUCLEATED RED BLOOD CELLS: 2 /100 WBC
PDW BLD-RTO: 22.4 % (ref 11.5–14.5)
PHOSPHORUS: 3.7 MG/DL (ref 2.4–4.7)
PLATELET # BLD: 72 THOU/MM3 (ref 130–400)
PLATELET ESTIMATE: ABNORMAL
PMV BLD AUTO: 9.4 MCM (ref 7.4–10.4)
POIKILOCYTES: ABNORMAL
POTASSIUM SERPL-SCNC: 4.1 MEQ/L (ref 3.5–5.2)
RBC # BLD: 4.03 MILL/MM3 (ref 4.2–5.4)
RBC # BLD: ABNORMAL 10*6/UL
SCAN OF BLOOD SMEAR: NORMAL
SEG NEUTROPHILS: 85.6 %
SEGMENTED NEUTROPHILS ABSOLUTE COUNT: 6.6 THOU/MM3 (ref 1.8–7.7)
SODIUM BLD-SCNC: 141 MEQ/L (ref 135–145)
TOTAL PROTEIN: 5.7 G/DL (ref 6.1–8)
WBC # BLD: 7.7 THOU/MM3 (ref 4.8–10.8)

## 2017-09-20 PROCEDURE — 6370000000 HC RX 637 (ALT 250 FOR IP): Performed by: NURSE PRACTITIONER

## 2017-09-20 PROCEDURE — 2580000003 HC RX 258: Performed by: INTERNAL MEDICINE

## 2017-09-20 PROCEDURE — 97166 OT EVAL MOD COMPLEX 45 MIN: CPT

## 2017-09-20 PROCEDURE — 6370000000 HC RX 637 (ALT 250 FOR IP): Performed by: FAMILY MEDICINE

## 2017-09-20 PROCEDURE — 6370000000 HC RX 637 (ALT 250 FOR IP): Performed by: INTERNAL MEDICINE

## 2017-09-20 PROCEDURE — 99232 SBSQ HOSP IP/OBS MODERATE 35: CPT | Performed by: INTERNAL MEDICINE

## 2017-09-20 PROCEDURE — 85025 COMPLETE CBC W/AUTO DIFF WBC: CPT

## 2017-09-20 PROCEDURE — 6370000000 HC RX 637 (ALT 250 FOR IP): Performed by: PHARMACIST

## 2017-09-20 PROCEDURE — 97530 THERAPEUTIC ACTIVITIES: CPT

## 2017-09-20 PROCEDURE — 6360000002 HC RX W HCPCS: Performed by: INTERNAL MEDICINE

## 2017-09-20 PROCEDURE — 83735 ASSAY OF MAGNESIUM: CPT

## 2017-09-20 PROCEDURE — 82140 ASSAY OF AMMONIA: CPT

## 2017-09-20 PROCEDURE — 84100 ASSAY OF PHOSPHORUS: CPT

## 2017-09-20 PROCEDURE — 36415 COLL VENOUS BLD VENIPUNCTURE: CPT

## 2017-09-20 PROCEDURE — 80053 COMPREHEN METABOLIC PANEL: CPT

## 2017-09-20 PROCEDURE — 92523 SPEECH SOUND LANG COMPREHEN: CPT

## 2017-09-20 PROCEDURE — 2140000000 HC CCU INTERMEDIATE R&B

## 2017-09-20 RX ORDER — LANOLIN ALCOHOL/MO/W.PET/CERES
3 CREAM (GRAM) TOPICAL NIGHTLY PRN
Status: DISCONTINUED | OUTPATIENT
Start: 2017-09-20 | End: 2017-10-05 | Stop reason: HOSPADM

## 2017-09-20 RX ORDER — DIPHENHYDRAMINE HCL 25 MG
25 TABLET ORAL ONCE
Status: COMPLETED | OUTPATIENT
Start: 2017-09-20 | End: 2017-09-20

## 2017-09-20 RX ORDER — HALOPERIDOL 5 MG/ML
1 INJECTION INTRAMUSCULAR EVERY 6 HOURS PRN
Status: DISCONTINUED | OUTPATIENT
Start: 2017-09-20 | End: 2017-10-05 | Stop reason: HOSPADM

## 2017-09-20 RX ADMIN — LEVOTHYROXINE SODIUM 112 MCG: 112 TABLET ORAL at 05:40

## 2017-09-20 RX ADMIN — PIPERACILLIN SODIUM,TAZOBACTAM SODIUM 3.38 G: 3; .375 INJECTION, POWDER, FOR SOLUTION INTRAVENOUS at 20:10

## 2017-09-20 RX ADMIN — Medication 3 MG: at 20:15

## 2017-09-20 RX ADMIN — PIPERACILLIN SODIUM,TAZOBACTAM SODIUM 3.38 G: 3; .375 INJECTION, POWDER, FOR SOLUTION INTRAVENOUS at 12:15

## 2017-09-20 RX ADMIN — CINACALCET HYDROCHLORIDE 30 MG: 30 TABLET, COATED ORAL at 09:47

## 2017-09-20 RX ADMIN — METOPROLOL TARTRATE 50 MG: 50 TABLET, FILM COATED ORAL at 09:48

## 2017-09-20 RX ADMIN — AMLODIPINE BESYLATE 10 MG: 10 TABLET ORAL at 09:47

## 2017-09-20 RX ADMIN — METRONIDAZOLE 500 MG: 500 TABLET, FILM COATED ORAL at 17:34

## 2017-09-20 RX ADMIN — SODIUM CHLORIDE 250 MG: 9 INJECTION, SOLUTION INTRAVENOUS at 12:14

## 2017-09-20 RX ADMIN — RIFAXIMIN 550 MG: 550 TABLET ORAL at 20:09

## 2017-09-20 RX ADMIN — FAMOTIDINE 20 MG: 20 TABLET, FILM COATED ORAL at 09:47

## 2017-09-20 RX ADMIN — URSODIOL 300 MG: 300 CAPSULE ORAL at 09:48

## 2017-09-20 RX ADMIN — LACTULOSE 20 G: 20 SOLUTION ORAL at 09:48

## 2017-09-20 RX ADMIN — METRONIDAZOLE 500 MG: 500 TABLET, FILM COATED ORAL at 00:01

## 2017-09-20 RX ADMIN — METRONIDAZOLE 500 MG: 500 TABLET, FILM COATED ORAL at 12:20

## 2017-09-20 RX ADMIN — URSODIOL 300 MG: 300 CAPSULE ORAL at 20:10

## 2017-09-20 RX ADMIN — HYDRALAZINE HYDROCHLORIDE 25 MG: 25 TABLET, FILM COATED ORAL at 05:40

## 2017-09-20 RX ADMIN — Medication 325 MG: at 17:34

## 2017-09-20 RX ADMIN — LACTULOSE 20 G: 20 SOLUTION ORAL at 20:10

## 2017-09-20 RX ADMIN — Medication 325 MG: at 09:47

## 2017-09-20 RX ADMIN — DIPHENHYDRAMINE HCL 25 MG: 25 TABLET ORAL at 02:34

## 2017-09-20 RX ADMIN — CIPROFLOXACIN 500 MG: 500 TABLET, FILM COATED ORAL at 20:10

## 2017-09-20 RX ADMIN — HYDRALAZINE HYDROCHLORIDE 25 MG: 25 TABLET, FILM COATED ORAL at 00:00

## 2017-09-20 RX ADMIN — LACTULOSE 20 G: 20 SOLUTION ORAL at 17:34

## 2017-09-20 RX ADMIN — SODIUM CHLORIDE: 9 INJECTION, SOLUTION INTRAVENOUS at 21:29

## 2017-09-20 RX ADMIN — VITAMIN D, TAB 1000IU (100/BT) 5000 UNITS: 25 TAB at 12:20

## 2017-09-20 RX ADMIN — METRONIDAZOLE 500 MG: 500 TABLET, FILM COATED ORAL at 05:40

## 2017-09-20 ASSESSMENT — PAIN SCALES - GENERAL
PAINLEVEL_OUTOF10: 0
PAINLEVEL_OUTOF10: 0

## 2017-09-20 NOTE — PROGRESS NOTES
[address] [address]  SPEECH THERAPY  STRZ CCU-STEPDOWN 3B  Speech  Language  Cognitive Evaluation    SLP Individual Minutes  Time In: 2712  Time Out: 1100  Minutes: 15          Date: 2017  Patient Name: Nicolás Whitfield      MRN: [id number]    : 1940  (79 y.o.)  Gender: female   Referring Physician:  Dr Jay Rodriguez  Diagnosis: hypertensive urgency  Secondary Diagnosis:  Cognitive impairment  History of Present Illness/Injury: Patient with history of CASTANON and cirrhosis, transferred with report of altered mental status, uncontrolled hypertension with SBP in the 200s. She has history of atrial fibrillation, not on anticoagulation due to thrombocytopenia. Past Medical History:   Diagnosis Date    Anemia     Arthritis     Blood transfusion reaction     Cancer ([facility] [address]     colon in polyps    Cirrhosis ([facility] [address]     biliary    Heart murmur     Hernia     Hypertension     Thyroid disease     Unspecified diseases of blood and blood-forming organs     anemia       Precautions: falls aspiration  Pain:  0/10    Subjective:  Pt seen at bedside. Poor alertness and attention  SOCIAL HISTORY: Per chart review, pt is from home alone. Pt unable to provide further details regarding PLOF. SPEECH / VOICE:  Vocal quality WFL. Fluctuating dysarthria possibly related to alertness. LANGUAGE:  Receptive:  1 step commands: 3/5  2 step commands: 0/2  Simple yes/no: 1/2    Expressive:  Automatic speech: Numbers 1-10: stated 1-3 then no response  Sentence Completion (automatic): 0/2 no response  Confrontational namin/3  Divergent naming (concrete): no response  Sentence Formulation: nonsensical speech at times. COGNITION:  Unable to fully assess due to poor alertness a well as expressive and receive language impairment. Will continue to assess during therapy sessions    SWALLOWING:  BSE completed  with recommendation for regular and thin diet. See report for details.       IMPRESSIONS: Pt presents to r/o aspiratino.   Goal 3: Pt will follow 1 step commands and answer basic yes/no questions with 70% accuracy to improve receptive language skills  Goal 4: Pt will complete automatic speech tasks and basic naming tasks with 70% accuracy to improve verbal expression  Goal 5: Complete further cognitive assessment as attention and basic communication skills improve      Juancho Tamez M.S. [id number]

## 2017-09-20 NOTE — PLAN OF CARE
access. Will continue to monitor with cardiac telemetry. Problem: Mental Status - Impaired:  Goal: Mental status will be restored to baseline  Mental status will be restored to baseline   Outcome: Ongoing  Patient still very confused. Patient disoriented x 4. Will continue to monitor. Problem: Musculor/Skeletal Functional Status  Goal: Highest potential functional level  Outcome: Ongoing  Patient has OT working with patient to obtain highest functional level. Will continue to monitor. Comments:   Care plan reviewed with patient. Patient verbalized understanding of the plan of care and contribute to goal setting.

## 2017-09-20 NOTE — PLAN OF CARE
Problem: Falls - Risk of  Goal: Absence of falls  Outcome: Ongoing  No falls this shift. Bed in locked and low position with side rails up x 3 and call light within reach. Tele monitor in use as well as bed alarm    Problem: Nutrition  Goal: Optimal nutrition therapy  Outcome: Not Met This Shift  Patient eating very little. Does take sips from her Ensure but does not finish any  Of them    Problem: Risk for Impaired Skin Integrity  Goal: Tissue integrity - skin and mucous membranes  Structural intactness and normal physiological function of skin and  mucous membranes. Outcome: Ongoing  Patient moves in bed often but has a tendency to scoot more than turn increasing shearing risk. Has sacral dressing on her cocyx    Problem: Discharge Planning:  Goal: Participates in care planning  Participates in care planning   Outcome: Ongoing  Patient unable to participate in discharge planning due to mental status but family meeting with palliative care is occurring today    Problem: Airway Clearance - Ineffective:  Goal: Ability to maintain a clear airway will improve  Ability to maintain a clear airway will improve   Outcome: Ongoing  No noted airway issues - is able to cough and swallow without issues    Problem: Anxiety/Stress:  Goal: Level of anxiety will decrease  Level of anxiety will decrease   Outcome: Ongoing  Unsure if patient is actually anxious or ifv she is more confused but is fairly calm    Problem: Bowel Function - Altered:  Goal: Bowel elimination is within specified parameters  Bowel elimination is within specified parameters   Outcome: Ongoing  No bowel issues at present.   Getting Lactulose 3 times a day for liver issues    Problem: Cardiac Output - Decreased:  Goal: Hemodynamic stability will improve  Hemodynamic stability will improve   Outcome: Ongoing  Vitals are stable and remains in NSR    Problem: Mental Status - Impaired:  Goal: Mental status will be restored to baseline  Mental status will be restored to baseline   Outcome: Ongoing  Continues to be confused     Problem: Musculor/Skeletal Functional Status  Goal: Highest potential functional level  Outcome: Ongoing  Patient moves all extremities when asked but is weak.

## 2017-09-20 NOTE — FLOWSHEET NOTE
While connecting with the patient, there were no family members present. I offered words of encouragement to the pt. Plan: Pt is in Palliative care.  Spiritual support would be helpful.     09/20/17 0946   Encounter Summary   Services provided to: Patient   Referral/Consult From: [address] [place] [address] Family members   Continue Visiting Yes  (9/20/17 Palliative Care pt. )   Complexity of Encounter Moderate   Length of Encounter 15 minutes   Routine   Type Follow up   Assessment Coping   Intervention Nurtured hope   Outcome Coping   Spiritual/Protestant   Type Spiritual support

## 2017-09-20 NOTE — PLAN OF CARE
Problem: DISCHARGE BARRIERS  Goal: Patients continuum of care needs are met  Outcome: Ongoing  1530: Spoke with pt son Jake Arredondo and daughter Elías Suarez to review pt status and goals of care. At this time pt remains confused, resting in bed. Pt does not appear in any acute distress. Discussed options for care including code status options and benefits and risks. Daughter and son state that pt overall status has been declining in past year. We discuss that pt will need 24 hour care at time of discharge, and family acknowledge this. We also discuss in detail benefits and risks of aggressive measures and family \"unsure if Mom should to go though all of that. \" We discuss that underlying health issues cannot be cured, but may respond to medical intervention and pt may improve some. We also discuss Limited code status and benefits and risks. Family voice understanding and daughter offers that her mother has told her she did not want to be on a vent. After much discussion family state that they would like to see how pt does for next 24-48 hours and pt is not improving will consider changing code status at that time. Emotional support given and pt remains FULL CODE at this time.

## 2017-09-20 NOTE — PROGRESS NOTES
methylPREDNISolone (SOLU-MEDROL) IVPB  250 mg Intravenous Daily    ursodiol  300 mg Oral BID    piperacillin-tazobactam  3.375 g Intravenous Q8H    cinacalcet  30 mg Oral Daily    famotidine  20 mg Oral Daily    ciprofloxacin  500 mg Oral Q24H    amLODIPine  10 mg Oral Daily    metroNIDAZOLE  500 mg Oral 4 times per day    metoprolol tartrate  50 mg Oral BID    nicotine  1 patch Transdermal Daily    phosphorus replacement protocol   Other RX Placeholder    lactulose  20 g Oral TID    sodium chloride flush  10 mL Intravenous 2 times per day       Infusions:    sodium chloride 100 mL/hr at 09/20/17 1218       PRN Meds: melatonin, haloperidol lactate, acetaminophen, magnesium hydroxide, ondansetron, potassium chloride, magnesium sulfate, labetalol, sodium chloride flush    Labs/imaging:  CBC:   Recent Labs      09/18/17 0927 09/19/17   0735  09/20/17   0616   WBC  6.5  8.0  7.7   HGB  9.9*  9.8*  10.2*   PLT  55*  58*  72*         BMP:    Recent Labs      09/18/17 0927 09/19/17   0735  09/20/17   0616   NA  140  145  141   K  4.3  4.2  4.1   CL  107  105  106   CO2  21*  20*  19*   BUN  53*  51*  53*   CREATININE  1.7*  1.8*  2.0*   GLUCOSE  102  111*  121*         Hepatic:   Recent Labs      09/18/17 0927 09/19/17   0735  09/20/17   0616   AST  24  30  36   ALT  15  17  19   BILITOT  1.8*  2.0*  2.2*   ALKPHOS  192*  180*  180*     INR:   No results for input(s): INR in the last 72 hours. Reviewed imaging and reports noted    Diet: DIET CARDIAC;   Dietary Nutrition Supplements: Low Calorie High Protein Supplement    Ambar Tello MD  -------------------------------  Rounding hospitalist

## 2017-09-20 NOTE — PROGRESS NOTES
Endocrinology Progress Note    Patient:  Rosalinda Baird      Unit/Bed:3B-24/024-A    YOB: 1940    MRN: [id number]     Acct: [de-identified]     Admit date: 9/15/2017    Patient Seen, Chart, Consults notes, Labs, Radiology studies reviewed. Subjective:   CC: Hypercalcemia. Confused. Denies pain. Diet:  DIET CARDIAC; Dietary Nutrition Supplements: Low Calorie High Protein Supplement    Medications:  Scheduled Meds:   vitamin D  5,000 Units Oral Daily    levothyroxine  112 mcg Oral Daily    ferrous sulfate  325 mg Oral BID WC    methylPREDNISolone (SOLU-MEDROL) IVPB  250 mg Intravenous Daily    ursodiol  300 mg Oral BID    piperacillin-tazobactam  3.375 g Intravenous Q8H    cinacalcet  30 mg Oral Daily    famotidine  20 mg Oral Daily    ciprofloxacin  500 mg Oral Q24H    amLODIPine  10 mg Oral Daily    metroNIDAZOLE  500 mg Oral 4 times per day    metoprolol tartrate  50 mg Oral BID    nicotine  1 patch Transdermal Daily    phosphorus replacement protocol   Other RX Placeholder    lactulose  20 g Oral TID    sodium chloride flush  10 mL Intravenous 2 times per day     Continuous Infusions:   sodium chloride 100 mL/hr at 09/20/17 1218     PRN Meds:melatonin, haloperidol lactate, acetaminophen, magnesium hydroxide, ondansetron, potassium chloride, magnesium sulfate, labetalol, sodium chloride flush    Objective:    CBC:   Recent Labs      09/18/17   0927  09/19/17   0735  09/20/17   0616   WBC  6.5  8.0  7.7   HGB  9.9*  9.8*  10.2*   PLT  55*  58*  72*     BMP:    Recent Labs      09/18/17   0927  09/19/17   0735  09/20/17   0616   NA  140  145  141   K  4.3  4.2  4.1   CL  107  105  106   CO2  21*  20*  19*   BUN  53*  51*  53*   CREATININE  1.7*  1.8*  2.0*   GLUCOSE  102  111*  121*     Calcium:  Recent Labs      09/20/17   0616   CALCIUM  10.5     Ionized Calcium:No results for input(s): IONCA in the last 72 hours.   Magnesium:  Recent Labs      09/20/17   0616   MG  2.0 Phosphorus:  Recent Labs      09/20/17   0616   PHOS  3.7     BNP:No results for input(s): BNP in the last 72 hours. Glucose:No results for input(s): POCGLU in the last 72 hours. HgbA1C: No results for input(s): LABA1C in the last 72 hours. INR: No results for input(s): INR in the last 72 hours. Hepatic:   Recent Labs      09/20/17   0616   ALKPHOS  180*   ALT  19   AST  36   PROT  5.7*   BILITOT  2.2*   LABALBU  2.4*     Amylase and Lipase:No results for input(s): LACTA, AMYLASE in the last 72 hours. Lactic Acid: No results for input(s): LACTA in the last 72 hours. Troponin: No results for input(s): CKTOTAL, CKMB, TROPONINT in the last 72 hours. BNP: No results for input(s): BNP in the last 72 hours. Lipids: No results for input(s): CHOL, TRIG, HDL, LDLCALC in the last 72 hours. Invalid input(s): LDL  ABGs:   Lab Results   Component Value Date    PH 7.48 09/03/2013    PCO2 36 09/03/2013    PO2 64 09/03/2013    HCO3 27 09/03/2013    O2SAT 94 09/03/2013       Radiology reports as per the Radiologist  Radiology: Us Liver    Result Date: 9/15/2017  PROCEDURE: US GALLBLADDER RUQ, US LIVER CLINICAL INFORMATION: CIRRHOSIS, HEPATITIS,  . COMPARISON: No prior study. TECHNIQUE: Grayscale and color Doppler ultrasound FINDINGS: Exam is somewhat limited due to patient condition. Liver Liver is somewhat decreased in size. No focal mass or intrahepatic ductal dilatation is seen. There are multiple tortuous vessels at the new hepatis. There is possible recannulated the umbilicus vein. There are multiple hypoechoic masses at the new hepatis indicating periportal pericaval adenopathy. Some of these are as large as 9.7 cm. The gallbladder shows no evidence of stones there is gallbladder wall thickening and pericholecystic edema and sludge within the gallbladder. Common bile duct is not enlarged. 1. Cirrhotic liver with possible recannulated emboli: Vein. 2. Marked periportal pericaval adenopathy.  Follow-up with CT scan is recommended. 3. Gallbladder sludge with gallbladder wall thickening and pericholecystic edema. Acalculous cholecystitis is a possibility. **This report has been created using voice recognition software. It may contain minor errors which are inherent in voice recognition technology. ** Final report electronically signed by Dr. Sharita Lemus on 9/15/2017 7:23 PM    Us Gallbladder Ruq    Result Date: 9/15/2017  PROCEDURE: US GALLBLADDER RUQ, US LIVER CLINICAL INFORMATION: CIRRHOSIS, HEPATITIS,  . COMPARISON: No prior study. TECHNIQUE: Grayscale and color Doppler ultrasound FINDINGS: Exam is somewhat limited due to patient condition. Liver Liver is somewhat decreased in size. No focal mass or intrahepatic ductal dilatation is seen. There are multiple tortuous vessels at the new hepatis. There is possible recannulated the umbilicus vein. There are multiple hypoechoic masses at the new hepatis indicating periportal pericaval adenopathy. Some of these are as large as 9.7 cm. The gallbladder shows no evidence of stones there is gallbladder wall thickening and pericholecystic edema and sludge within the gallbladder. Common bile duct is not enlarged. 1. Cirrhotic liver with possible recannulated emboli: Vein. 2. Marked periportal pericaval adenopathy. Follow-up with CT scan is recommended. 3. Gallbladder sludge with gallbladder wall thickening and pericholecystic edema. Acalculous cholecystitis is a possibility. **This report has been created using voice recognition software. It may contain minor errors which are inherent in voice recognition technology. ** Final report electronically signed by Dr. Sharita Lemus on 9/15/2017 7:23 PM    Xr Chest Portable    Result Date: 9/15/2017  PROCEDURE: XR CHEST PORTABLE CLINICAL INFORMATION: NG tube placement,  . COMPARISON: 9/15/2017 at 1740 TECHNIQUE: Portable supine FINDINGS: NG tube is been advanced and is well into the stomach.  Distal tip is not included on the image. There are no other changes. NG tube is well into the stomach **This report has been created using voice recognition software. It may contain minor errors which are inherent in voice recognition technology. ** Final report electronically signed by Dr. Fausto Alfonso on 9/15/2017 7:10 PM    Xr Chest Portable    Result Date: 9/15/2017  PROCEDURE: XR CHEST PORTABLE CLINICAL INFORMATION: NG placement,  . COMPARISON: 9/15/2017 TECHNIQUE: Portable supine FINDINGS: NG tube extends to the distal esophagus. It needs to be advanced 7 cm to be in the proximal stomach. There are no other changes. NG tube in the distal esophagus **This report has been created using voice recognition software. It may contain minor errors which are inherent in voice recognition technology. ** Final report electronically signed by Dr. Fausto Alfonso on 9/15/2017 6:10 PM    Xr Chest Portable    Result Date: 9/15/2017  PROCEDURE: XR CHEST PORTABLE CLINICAL INFORMATION: AMS, . COMPARISON: PA and lateral chest x-ray December 5, 2015. TECHNIQUE: AP upright view of the chest. FINDINGS: The heart size remains mildly enlarged. The mediastinum is not widened. There is mildly increased density in the right lower lobe mostly in the right infrahilar region. There is obscuration to moderate degree the right lateral costophrenic angle. The rest of the lungs are clear. The pulmonary vascularity is normal. No suspicious osseous lesions are present. 1.  Findings consistent with small to moderate size right pleural effusion with underlying atelectasis. Also in the differential would be early infiltrate but this is less favored. Recommend a true PA and lateral chest x-ray preferably in the x-ray department if patient can tolerate. **This report has been created using voice recognition software. It may contain minor errors which are inherent in voice recognition technology. ** Final report electronically signed by Dr. Blue Quinteros on 9/15/2017 9:14 AM    Us Abdominal Limited    Result Date: 9/15/2017  PROCEDURE: US ABDOMINAL LIMITED CLINICAL INFORMATION: Cirrhosis with ascites,  . COMPARISON: None available. TECHNIQUE: Lizz Lund scale sonographic imaging of the 4 quadrants of the abdomen performed for localization of ascites. FINDINGS: There is a trace amount of free fluid deep to the abdominal wall at the left midabdomen. No large fluid collection is identified. Trace amount of fluid within the abdomen. Insufficient quantity of ascites to safely perform an ultrasound-guided paracentesis. **This report has been created using voice recognition software. It may contain minor errors which are inherent in voice recognition technology. ** Final report electronically signed by Dr. Doyle Coburn on 9/15/2017 5:13 PM       Physical Exam:  Vitals: BP (!) 116/55  Pulse 52  Temp 97.4 °F (36.3 °C) (Axillary)   Resp 18  Ht 5' 1\" (1.549 m)  Wt 149 lb 1.6 oz (67.6 kg)  SpO2 97%  BMI 28.17 kg/m2  24 hour intake/output:    Intake/Output Summary (Last 24 hours) at 09/20/17 1338  Last data filed at 09/20/17 0320   Gross per 24 hour   Intake           2524.7 ml   Output              700 ml   Net           1824.7 ml     Last 3 weights: Wt Readings from Last 3 Encounters:   09/20/17 149 lb 1.6 oz (67.6 kg)   02/16/17 131 lb (59.4 kg)   12/10/15 122 lb (55.3 kg)       General:  Awake, alert, not in distress. HEENT: Atraumatic, normocephalic. Pink conjunctiva, Anicteric sclera. Pink and moist oral mucosa. Trachea midline. Neck supple. Chest: Bilateal air entry, Clear to auscultation, no wheezing, rhonchi or rales. Cardiovascular: RRR, B0Y6, + murmur, click, rub or gallop. No lower extremity edema. Pedal and posterior tibialis 2+. Abdomen: Soft, non tender to palpation. Active bowel sounds x 4 quadrants. Musculoskeletal: passive and active ROM x 4 extremities. Integumentary: Pink, warm and dry. Free from rash or lesions. CNS: Oriented to person only.  Cranial nerves 2-12 grossly intact. Speech clear. Normal sensation. Face symmetrical. No tremor. Psych: Confused                      Assessment / Plan:    1. Hypercalcemia with elevated PTH, suggesting possible primary hyperparathyroidism. Received Calcitonin SQ x 1 and daily Cinacalcet 30 mg starting 9/18. Calcium this Am 10.5. Normal urinary calcium. Vitamin D 1.25 dihydroxy pending. Sestamibi scan scheduled for tomorrow. 2. Hypothyrodisim. PO synthroid. TSH 2.410, FT4 1. 12.  3. Hypovitaminosis D. Vit D 10. Do not recommend replacement due to #1. 4. Cirrhosis with hepatic encephalopathy. GI following. Lactulose. Ammonia level has improved. 5. Encephalopathy, multifactorial with #1/4/5.  6. Right lower lobe pneumonia, Pulmonary following. 7. Right pleural effusion. 8. Acute kidney injury, nephrology consulted. Dispo: Parathyroid scan in AM.     Electronically signed by Carter Carbone CNP on 9/20/2017 at 1:38 PM  Rounding for Dr. Shaquille Wilson, Endocrinologist.     Endocrine attending addendum    Subjective:   Admit Date: 9/15/2017  PCP: Parker Mckeon  Interval History: Subsequent hospital encounter for this 30-year-old female whom we have seeing for primary hyperparathyroidism. She presented with an altered mental status along with uncontrolled blood pressure. Patient has history of hypothyroidism. She has received Sensipar, PTH came down to 120 with calcium of 10.5. Vitamin D level was low at 10 and she is on replacement. The patient is mentally confused and is not able to provide any meaningful history. She is not in any acute respiratory distress. There is no fever. Ros: As noted in HPI. The remainder of systems were reviewed and negative. PFSH: unchanged from admission.         Objective:   Vitals: BP (!) 113/55  Pulse 57  Temp 92.6 °F (33.7 °C) (Oral)   Resp 18  Ht 5' 1\" (1.549 m)  Wt 151 lb 6.4 oz (68.7 kg)  SpO2 95%  BMI 28.61 kg/m2  General appearance: alert and cooperative with exam  HEENT: Head: Normal, normocephalic, atraumatic. Neck: no adenopathy, no carotid bruit, no JVD and supple, symmetrical, trachea midline  Lungs: clear to auscultation bilaterally  Heart: regular rate and rhythm, S1, S2 normal, no murmur, click, rub or gallop  Abdomen: soft, non-tender; bowel sounds normal; no masses,  no organomegaly  Extremities: extremities normal, atraumatic, no cyanosis or edema  Neurologic: Mental status: Awake and alert but confused. Assessment and Plan:   1. Hypercalcemia: Due to primary hyperparathyroidism. She is responding to Sensipar. Continue to monitor calcium and PTH. Sestamibi scan is pending. 2.  Encephalopathy: Multifactorial with contributing factors including hypercalcemia, liver disease and possibly age-related. Supportive care. 3. Low vitamin D: Multiple risk factors including age, inadequate sun exposure and poor intake. Within the context of hyperparathyroidism I would urge caution in replacement. Therefore she needs to be closely monitored. 4.  Hypothyroidism: Replaced. 5.  Acute kidney disease: Multifactorial.  Renal is on board.     Abdirashid Springer MD  Board-Certified Endocrinologist.            Electronically signed by Abdirashid Springer MD on 9/20/2017 at 8:59 PM

## 2017-09-20 NOTE — PROGRESS NOTES
Renal Progress Note    Assessment and Plan:    1.acute kidney injury stable from interstitial nephritis ? 2. Hypercalcemia improved   3. Hyperparathyroidism p[rimary versus secondary  4. Liver cirrhosis   5. Agitataion ? 6. Insomnia   7. Anemia   8. Thrombocytopenia   9. Vit d deficiency  PLAN  Reviewed medications   Labs pending this am incluiding kappa/lambda free light chain assay  Haloperidol 1 mg IV every 6 hrs,prn agitation . Hold for temp of 100 degree F or higher to avoid Neuroleptic Malignant Syndrome    Melatonin 3 mg po bedtime ,prn sleep  Decrease IV to 100 ml/hr   PT and OT  Hold hydralazine as it can be sedative   Vi t D 5,000 IU daily  AM labs   Will follow     Patient Active Problem List:     GIST (gastrointestinal stromal tumor), non-malignant     Essential hypertension     Cirrhosis of liver with ascites (HCC)     Cancer (HCC)     Hypothyroidism     Dizzy spells     Fever     Epigastric pain     History of atrial fibrillation     Primary biliary cirrhosis (HCC)     Hypertensive urgency     Pleural effusion     Acute hepatic encephalopathy     Thrombocytopenia (HCC)     Microcytic anemia     Hyperbilirubinemia     Hypokalemia     Urinary retention     Hypercalcemia, not POA     Acute renal failure, not POA     Pneumonia, organism unspecified     BEN (acute kidney injury) ([facility] [address]     Dehydration     Acquired hypothyroidism     Hypovitaminosis D      Subjective:   Admit Date: 9/15/2017    Interval History:   Seeing for acute kidney injury and hypercalcemia   Awake this am  Updated by the staff  Have not had much sleep in several days   Received benadryl last which did not help  Still agitated   Good blood pressure   Ok urine output though marginal      Medications:   Scheduled Meds:   levothyroxine  112 mcg Oral Daily    ferrous sulfate  325 mg Oral BID WC    methylPREDNISolone (SOLU-MEDROL) IVPB  250 mg Intravenous Daily    ursodiol  300 mg Oral BID    piperacillin-tazobactam  3.375 g Intravenous Q8H    cinacalcet  30 mg Oral Daily    famotidine  20 mg Oral Daily    ciprofloxacin  500 mg Oral Q24H    amLODIPine  10 mg Oral Daily    metroNIDAZOLE  500 mg Oral 4 times per day    metoprolol tartrate  50 mg Oral BID    nicotine  1 patch Transdermal Daily    phosphorus replacement protocol   Other RX Placeholder    lactulose  20 g Oral TID    sodium chloride flush  10 mL Intravenous 2 times per day     Continuous Infusions:   sodium chloride 125 mL/hr at 09/19/17 0944       CBC:   Recent Labs      09/18/17   0927  09/19/17   0735   WBC  6.5  8.0   HGB  9.9*  9.8*   PLT  55*  58*     CMP:  Recent Labs      09/17/17   1645  09/18/17   0927  09/19/17   0735   NA  144  140  145   K  5.0  4.3  4.2   CL  107  107  105   CO2  22*  21*  20*   BUN  50*  53*  51*   CREATININE  1.5*  1.7*  1.8*   GLUCOSE  104  102  111*   CALCIUM  11.9*  11.5*  10.8*   LABGLOM  34*  29*  27*     Troponin: No results for input(s): TROPONINI in the last 72 hours. BNP: No results for input(s): BNP in the last 72 hours. INR: No results for input(s): INR in the last 72 hours. Lipids: No results for input(s): CHOL, LDLDIRECT, TRIG, HDL, AMYLASE, LIPASE in the last 72 hours. Liver: Recent Labs      09/19/17   0735   AST  30   ALT  17   ALKPHOS  180*   PROT  5.4*   LABALBU  2.3*   BILITOT  2.0*     Iron:  No results for input(s): IRONS, FERRITIN in the last 72 hours.     Invalid input(s): LABIRONS    Objective:   Vitals: /61  Pulse 57  Temp 98.1 °F (36.7 °C) (Oral)   Resp 18  Ht 5' 1\" (1.549 m)  Wt 149 lb 1.6 oz (67.6 kg)  SpO2 97%  BMI 28.17 kg/m2   Wt Readings from Last 3 Encounters:   09/20/17 149 lb 1.6 oz (67.6 kg)   02/16/17 131 lb (59.4 kg)   12/10/15 122 lb (55.3 kg)      24HR INTAKE/OUTPUT:    Intake/Output Summary (Last 24 hours) at 09/20/17 0641  Last data filed at 09/20/17 0320   Gross per 24 hour   Intake           2524.7 ml   Output              700 ml   Net           1824.7 ml       Constitutional:  Alert, awake, no apparent distress   Skin:normal   HEENT:Pupils are reactive . Throat is clear   Neck:supple with no bruit  Cardiovascular:  S1, S2 without m/r/g   Respiratory:  CTA B without w/r/r   Abdomen: +bs, soft, non tender   Ext: No LE edema  Musculoskeletal:Intact  Neuro:Alert and alert ,oriented to self and date of birth      Electronically signed by Froilan Leslie MD on 9/20/2017 at 6:41 AM

## 2017-09-20 NOTE — PROGRESS NOTES
[facility] 60  INPATIENT OCCUPATIONAL THERAPY  STR CCU-STEPDOWN 3B  EVALUATION    Time:  Time In:   Time Out: 0831  Timed Code Treatment Minutes: 10 Minutes  Minutes: 24          Date: 2017  Patient Name: Tawanna Reynaga,   Gender: female      MRN: [id number]  : 1940  (68 y.o.)  Referring Practitioner: Dr. Belinda Freeman MD  Diagnosis: Hypertensive Urgency  Diagnosis: The encounter diagnosis was Hypercalcemia, not POA. Additional Pertinent Hx: Patient with history of CASTANON and cirrhosis, transferred with report of altered mental status, uncontrolled hypertension with SBP in the 200s. She has history of atrial fibrillation, not on anticoagulation due to thrombocytopenia    Restrictions/Precautions:  Restrictions/Precautions: General Precautions, Fall Risk                      Tawanna Reynaga  has a past medical history of Anemia; Arthritis; Blood transfusion reaction; Cancer ([facility] [address] Cirrhosis ([facility] [address] Heart murmur; Hernia; Hypertension; Thyroid disease; and Unspecified diseases of blood and blood-forming organs. Tawanna Reynaga  has a past surgical history that includes Colon surgery (2002); Cataract removal (); Tubal ligation (); Tonsillectomy (as a child); Colonoscopy (); Upper gastrointestinal endoscopy (3 2013); and tumor removal (). Subjective  Chart Reviewed:  (completed medical chart review including physician kaylynn avalos healthcare provider notes)  Patient assessed for rehabilitation services?: Yes    Subjective: Pt attempting to get OoB on own. Pt very confused this date and even becomig agitated with therapist when attempting to get pt to complete tasks and reorient pt to place and situation.      General:                      Pain:  Pain Assessment  Patient Currently in Pain: Denies       Social/Functional:  Lives With: Alone  Type of Home: House  Home Layout: One level  Home Access: Stairs to enter with rails  Entrance Stairs - Number of Steps: 3  Home Equipment:  (no Nursing Facility    Clinical Decision Making: Clinical Decision making was of Moderate Complexity as the result of analysis of data from a detailed assessment, a consideration of several treatment options, the presence of comorbidities affecting the plan of care and the need for minimal to moderate modifications or assistance required to complete the evaluation. Patient Education:  Patient Education: OT POC, orientation   Barriers to Learning: cogntion     Equipment Recommendations: Other: continue to assess     Safety:  Safety Devices in place: Yes  Type of devices: All fall risk precautions in place, Left in bed, Bed alarm in place, Call light within reach, Nurse notified, Gait belt    Plan:  Times per week: 3-5x   Current Treatment Recommendations: Safety Education & Training, Balance Training, Patient/Caregiver Education & Training, Self-Care / ADL, Functional Mobility Training, Endurance Training, Cognitive Reorientation    Goals:  Patient goals : pt unabel to state d/t cognition     Short term goals  Time Frame for Short term goals: 1 week  Short term goal 1: Pt to follow 1 step commands > 25% of time to compelte grooming tasks   Short term goal 2: Pt to complete grooming tasks with less than min A and no vcs for initiation of tasks or problem solving  Short term goal 3: Pt to complete bilateral UE AROM ex to increase UB activity tolerance for ADL tasks   Short term goal 4: OTR to further assess mobility as able   Long term goals  Time Frame for Long term goals : not est d/t ELOS     Evaluation Complexity: Based on the findings of patient history, examination, clinical presentation, and decision making during this evaluation, this patient is of medium complexity.

## 2017-09-21 ENCOUNTER — APPOINTMENT (OUTPATIENT)
Dept: GENERAL RADIOLOGY | Age: 77
DRG: 441 | End: 2017-09-21
Attending: ANESTHESIOLOGY
Payer: MEDICARE

## 2017-09-21 ENCOUNTER — APPOINTMENT (OUTPATIENT)
Dept: ULTRASOUND IMAGING | Age: 77
DRG: 441 | End: 2017-09-21
Attending: ANESTHESIOLOGY
Payer: MEDICARE

## 2017-09-21 ENCOUNTER — APPOINTMENT (OUTPATIENT)
Dept: NUCLEAR MEDICINE | Age: 77
DRG: 441 | End: 2017-09-21
Attending: ANESTHESIOLOGY
Payer: MEDICARE

## 2017-09-21 PROBLEM — T68.XXXA HYPOTHERMIA: Status: ACTIVE | Noted: 2017-09-21

## 2017-09-21 LAB
ALBUMIN SERPL-MCNC: 2.1 G/DL (ref 3.5–5.1)
ALP BLD-CCNC: 158 U/L (ref 38–126)
ALT SERPL-CCNC: 20 U/L (ref 11–66)
AMMONIA: < 10 UMOL/L (ref 11–60)
ANION GAP SERPL CALCULATED.3IONS-SCNC: 15 MEQ/L (ref 8–16)
ANISOCYTOSIS: ABNORMAL
AST SERPL-CCNC: 44 U/L (ref 5–40)
BACTERIA: ABNORMAL /HPF
BASOPHILIA: SLIGHT
BASOPHILS # BLD: 0 %
BASOPHILS ABSOLUTE: 0 THOU/MM3 (ref 0–0.1)
BILIRUB SERPL-MCNC: 1.9 MG/DL (ref 0.3–1.2)
BILIRUBIN URINE: NEGATIVE
BLOOD CULTURE, ROUTINE: NORMAL
BLOOD CULTURE, ROUTINE: NORMAL
BLOOD, URINE: NEGATIVE
BUN BLDV-MCNC: 61 MG/DL (ref 7–22)
CALCIUM SERPL-MCNC: 10 MG/DL (ref 8.5–10.5)
CASTS 2: ABNORMAL /LPF
CASTS UA: ABNORMAL /LPF
CHARACTER, URINE: ABNORMAL
CHLORIDE BLD-SCNC: 106 MEQ/L (ref 98–111)
CO2: 17 MEQ/L (ref 23–33)
COLOR: ABNORMAL
CORTISOL COLLECTION INFO: NORMAL
CREAT SERPL-MCNC: 2.1 MG/DL (ref 0.4–1.2)
CRENATED RBC'S: ABNORMAL
CRYSTALS, UA: ABNORMAL
EOSINOPHIL # BLD: 0.1 %
EOSINOPHIL SMEAR: NORMAL
EOSINOPHILS ABSOLUTE: 0 THOU/MM3 (ref 0–0.4)
EPITHELIAL CELLS, UA: ABNORMAL /HPF
GFR SERPL CREATININE-BSD FRML MDRD: 23 ML/MIN/1.73M2
GLUCOSE BLD-MCNC: 122 MG/DL (ref 70–108)
GLUCOSE URINE: NEGATIVE MG/DL
HCT VFR BLD CALC: 30.7 % (ref 37–47)
HEMOGLOBIN: 10.1 GM/DL (ref 12–16)
HYPOCHROMIA: ABNORMAL
KAPPA/LAMBDA FREE LIGHT CHAINS: NORMAL
KETONES, URINE: NEGATIVE
LEUKOCYTE ESTERASE, URINE: ABNORMAL
LYMPHOCYTES # BLD: 3.4 %
LYMPHOCYTES ABSOLUTE: 0.3 THOU/MM3 (ref 1–4.8)
MAGNESIUM: 2 MG/DL (ref 1.6–2.4)
MCH RBC QN AUTO: 24.9 PG (ref 27–31)
MCHC RBC AUTO-ENTMCNC: 32.8 GM/DL (ref 33–37)
MCV RBC AUTO: 76.2 FL (ref 81–99)
MICROCYTES: ABNORMAL
MISCELLANEOUS 2: ABNORMAL
MONOCYTES # BLD: 8.8 %
MONOCYTES ABSOLUTE: 0.8 THOU/MM3 (ref 0.4–1.3)
NITRITE, URINE: NEGATIVE
NUCLEATED RED BLOOD CELLS: 0 /100 WBC
PDW BLD-RTO: 22.2 % (ref 11.5–14.5)
PH UA: 5
PHOSPHORUS: 4.4 MG/DL (ref 2.4–4.7)
PLATELET # BLD: 80 THOU/MM3 (ref 130–400)
PLATELET ESTIMATE: ABNORMAL
PMV BLD AUTO: 9.8 MCM (ref 7.4–10.4)
POIKILOCYTES: ABNORMAL
POTASSIUM SERPL-SCNC: 4.6 MEQ/L (ref 3.5–5.2)
PROTEIN UA: 100
RBC # BLD: 4.04 MILL/MM3 (ref 4.2–5.4)
RBC # BLD: ABNORMAL 10*6/UL
RBC URINE: ABNORMAL /HPF
RENAL EPITHELIAL, UA: PRESENT
SCAN OF BLOOD SMEAR: NORMAL
SEG NEUTROPHILS: 87.7 %
SEGMENTED NEUTROPHILS ABSOLUTE COUNT: 7.5 THOU/MM3 (ref 1.8–7.7)
SODIUM BLD-SCNC: 138 MEQ/L (ref 135–145)
SPECIFIC GRAVITY, URINE: 1.03 (ref 1–1.03)
SPECIMEN: NORMAL
TOTAL PROTEIN: 5.4 G/DL (ref 6.1–8)
UROBILINOGEN, URINE: 0.2 EU/DL
WBC # BLD: 8.6 THOU/MM3 (ref 4.8–10.8)
WBC UA: ABNORMAL /HPF
YEAST: ABNORMAL

## 2017-09-21 PROCEDURE — 82140 ASSAY OF AMMONIA: CPT

## 2017-09-21 PROCEDURE — 87106 FUNGI IDENTIFICATION YEAST: CPT

## 2017-09-21 PROCEDURE — 6370000000 HC RX 637 (ALT 250 FOR IP): Performed by: INTERNAL MEDICINE

## 2017-09-21 PROCEDURE — 76775 US EXAM ABDO BACK WALL LIM: CPT

## 2017-09-21 PROCEDURE — 6370000000 HC RX 637 (ALT 250 FOR IP): Performed by: NURSE PRACTITIONER

## 2017-09-21 PROCEDURE — 36415 COLL VENOUS BLD VENIPUNCTURE: CPT

## 2017-09-21 PROCEDURE — 84100 ASSAY OF PHOSPHORUS: CPT

## 2017-09-21 PROCEDURE — 6360000002 HC RX W HCPCS: Performed by: INTERNAL MEDICINE

## 2017-09-21 PROCEDURE — 2580000003 HC RX 258: Performed by: INTERNAL MEDICINE

## 2017-09-21 PROCEDURE — 99232 SBSQ HOSP IP/OBS MODERATE 35: CPT | Performed by: NURSE PRACTITIONER

## 2017-09-21 PROCEDURE — 81001 URINALYSIS AUTO W/SCOPE: CPT

## 2017-09-21 PROCEDURE — 71010 XR CHEST PORTABLE: CPT

## 2017-09-21 PROCEDURE — 85025 COMPLETE CBC W/AUTO DIFF WBC: CPT

## 2017-09-21 PROCEDURE — 3430000000 HC RX DIAGNOSTIC RADIOPHARMACEUTICAL: Performed by: NURSE PRACTITIONER

## 2017-09-21 PROCEDURE — 99233 SBSQ HOSP IP/OBS HIGH 50: CPT | Performed by: INTERNAL MEDICINE

## 2017-09-21 PROCEDURE — 80053 COMPREHEN METABOLIC PANEL: CPT

## 2017-09-21 PROCEDURE — 83735 ASSAY OF MAGNESIUM: CPT

## 2017-09-21 PROCEDURE — 2580000003 HC RX 258: Performed by: NURSE PRACTITIONER

## 2017-09-21 PROCEDURE — 99232 SBSQ HOSP IP/OBS MODERATE 35: CPT | Performed by: INTERNAL MEDICINE

## 2017-09-21 PROCEDURE — A9537 TC99M MEBROFENIN: HCPCS | Performed by: NURSE PRACTITIONER

## 2017-09-21 PROCEDURE — 6370000000 HC RX 637 (ALT 250 FOR IP): Performed by: ANESTHESIOLOGY

## 2017-09-21 PROCEDURE — 87040 BLOOD CULTURE FOR BACTERIA: CPT

## 2017-09-21 PROCEDURE — 87086 URINE CULTURE/COLONY COUNT: CPT

## 2017-09-21 PROCEDURE — 2140000000 HC CCU INTERMEDIATE R&B

## 2017-09-21 PROCEDURE — 78226 HEPATOBILIARY SYSTEM IMAGING: CPT

## 2017-09-21 PROCEDURE — 99222 1ST HOSP IP/OBS MODERATE 55: CPT | Performed by: SURGERY

## 2017-09-21 PROCEDURE — 82652 VIT D 1 25-DIHYDROXY: CPT

## 2017-09-21 PROCEDURE — 89190 NASAL SMEAR FOR EOSINOPHILS: CPT

## 2017-09-21 RX ORDER — 0.9 % SODIUM CHLORIDE 0.9 %
250 INTRAVENOUS SOLUTION INTRAVENOUS ONCE
Status: COMPLETED | OUTPATIENT
Start: 2017-09-21 | End: 2017-09-21

## 2017-09-21 RX ORDER — HYDRALAZINE HYDROCHLORIDE 50 MG/1
50 TABLET, FILM COATED ORAL EVERY 8 HOURS SCHEDULED
Status: DISCONTINUED | OUTPATIENT
Start: 2017-09-21 | End: 2017-09-21

## 2017-09-21 RX ORDER — HYDRALAZINE HYDROCHLORIDE 25 MG/1
25 TABLET, FILM COATED ORAL EVERY 8 HOURS SCHEDULED
Status: DISCONTINUED | OUTPATIENT
Start: 2017-09-21 | End: 2017-09-22

## 2017-09-21 RX ADMIN — LEVOTHYROXINE SODIUM 112 MCG: 112 TABLET ORAL at 09:23

## 2017-09-21 RX ADMIN — PIPERACILLIN SODIUM,TAZOBACTAM SODIUM 3.38 G: 3; .375 INJECTION, POWDER, FOR SOLUTION INTRAVENOUS at 20:05

## 2017-09-21 RX ADMIN — AMLODIPINE BESYLATE 10 MG: 10 TABLET ORAL at 09:23

## 2017-09-21 RX ADMIN — SODIUM CHLORIDE 250 ML: 9 INJECTION, SOLUTION INTRAVENOUS at 14:02

## 2017-09-21 RX ADMIN — RIFAXIMIN 550 MG: 550 TABLET ORAL at 09:23

## 2017-09-21 RX ADMIN — METRONIDAZOLE 500 MG: 500 TABLET, FILM COATED ORAL at 00:47

## 2017-09-21 RX ADMIN — SODIUM CHLORIDE 250 ML: 9 INJECTION, SOLUTION INTRAVENOUS at 17:54

## 2017-09-21 RX ADMIN — VITAMIN D, TAB 1000IU (100/BT) 5000 UNITS: 25 TAB at 09:23

## 2017-09-21 RX ADMIN — Medication 325 MG: at 09:23

## 2017-09-21 RX ADMIN — SODIUM CHLORIDE: 9 INJECTION, SOLUTION INTRAVENOUS at 18:55

## 2017-09-21 RX ADMIN — METRONIDAZOLE 500 MG: 500 TABLET, FILM COATED ORAL at 12:34

## 2017-09-21 RX ADMIN — Medication 10 ML: at 09:23

## 2017-09-21 RX ADMIN — HALOPERIDOL LACTATE 1 MG: 5 INJECTION, SOLUTION INTRAMUSCULAR at 01:03

## 2017-09-21 RX ADMIN — URSODIOL 300 MG: 300 CAPSULE ORAL at 09:23

## 2017-09-21 RX ADMIN — PIPERACILLIN SODIUM,TAZOBACTAM SODIUM 3.38 G: 3; .375 INJECTION, POWDER, FOR SOLUTION INTRAVENOUS at 12:34

## 2017-09-21 RX ADMIN — SODIUM CHLORIDE: 9 INJECTION, SOLUTION INTRAVENOUS at 08:12

## 2017-09-21 RX ADMIN — PIPERACILLIN SODIUM,TAZOBACTAM SODIUM 3.38 G: 3; .375 INJECTION, POWDER, FOR SOLUTION INTRAVENOUS at 04:17

## 2017-09-21 RX ADMIN — Medication 8.9 MILLICURIE: at 14:50

## 2017-09-21 RX ADMIN — LACTULOSE 20 G: 20 SOLUTION ORAL at 09:23

## 2017-09-21 RX ADMIN — CINACALCET HYDROCHLORIDE 30 MG: 30 TABLET, COATED ORAL at 09:23

## 2017-09-21 RX ADMIN — FAMOTIDINE 20 MG: 20 TABLET, FILM COATED ORAL at 09:23

## 2017-09-21 RX ADMIN — SODIUM CHLORIDE 250 MG: 9 INJECTION, SOLUTION INTRAVENOUS at 12:20

## 2017-09-21 ASSESSMENT — PAIN SCALES - GENERAL
PAINLEVEL_OUTOF10: 0

## 2017-09-21 ASSESSMENT — PAIN SCALES - WONG BAKER
WONGBAKER_NUMERICALRESPONSE: 0
WONGBAKER_NUMERICALRESPONSE: 0

## 2017-09-21 NOTE — PROGRESS NOTES
Lab in to draw labs. Mentioned she thinks patient is picking at bottom. Chunks of feces noted on sheet. When turned, no bowel movement. Feces washed off patient's hands and clean sheets provided. Will continue to monitor.

## 2017-09-21 NOTE — PROGRESS NOTES
Endocrinology Progress Note    Patient:  Jude Gardner      Unit/Bed:3B-24/024-A    YOB: 1940    MRN: [id number]     Acct: [de-identified]     Admit date: 9/15/2017    Patient Seen, Chart, Consults notes, Labs, Radiology studies reviewed. Subjective:   CC: Hypercalcemia. Remains confused. Nursing staff report bradycardia and hypothermia. Diet:  DIET CARDIAC;   Dietary Nutrition Supplements: Low Calorie High Protein Supplement    Medications:  Scheduled Meds:   vitamin D  5,000 Units Oral Daily    rifaximin  550 mg Oral BID    levothyroxine  112 mcg Oral Daily    ferrous sulfate  325 mg Oral BID WC    methylPREDNISolone (SOLU-MEDROL) IVPB  250 mg Intravenous Daily    ursodiol  300 mg Oral BID    piperacillin-tazobactam  3.375 g Intravenous Q8H    cinacalcet  30 mg Oral Daily    famotidine  20 mg Oral Daily    ciprofloxacin  500 mg Oral Q24H    amLODIPine  10 mg Oral Daily    metroNIDAZOLE  500 mg Oral 4 times per day    metoprolol tartrate  50 mg Oral BID    nicotine  1 patch Transdermal Daily    phosphorus replacement protocol   Other RX Placeholder    lactulose  20 g Oral TID    sodium chloride flush  10 mL Intravenous 2 times per day     Continuous Infusions:   sodium chloride 100 mL/hr at 09/21/17 0812     PRN Meds:melatonin, haloperidol lactate, acetaminophen, magnesium hydroxide, ondansetron, potassium chloride, magnesium sulfate, labetalol, sodium chloride flush    Objective:    CBC:   Recent Labs      09/19/17   0735  09/20/17   0616  09/21/17   0452   WBC  8.0  7.7  8.6   HGB  9.8*  10.2*  10.1*   PLT  58*  72*  80*     BMP:    Recent Labs      09/19/17   0735  09/20/17   0616  09/21/17   0452   NA  145  141  138   K  4.2  4.1  4.6   CL  105  106  106   CO2  20*  19*  17*   BUN  51*  53*  61*   CREATININE  1.8*  2.0*  2.1*   GLUCOSE  111*  121*  122*     Calcium:  Recent Labs      09/21/17   0452   CALCIUM  10.0     Ionized Calcium:No results for input(s): IONCA in the recannulated emboli: Vein. 2. Marked periportal pericaval adenopathy. Follow-up with CT scan is recommended. 3. Gallbladder sludge with gallbladder wall thickening and pericholecystic edema. Acalculous cholecystitis is a possibility. **This report has been created using voice recognition software. It may contain minor errors which are inherent in voice recognition technology. ** Final report electronically signed by Dr. Geni Bautista on 9/15/2017 7:23 PM    Us Gallbladder Ruq    Result Date: 9/15/2017  PROCEDURE: US GALLBLADDER RUQ, US LIVER CLINICAL INFORMATION: CIRRHOSIS, HEPATITIS,  . COMPARISON: No prior study. TECHNIQUE: Grayscale and color Doppler ultrasound FINDINGS: Exam is somewhat limited due to patient condition. Liver Liver is somewhat decreased in size. No focal mass or intrahepatic ductal dilatation is seen. There are multiple tortuous vessels at the new hepatis. There is possible recannulated the umbilicus vein. There are multiple hypoechoic masses at the new hepatis indicating periportal pericaval adenopathy. Some of these are as large as 9.7 cm. The gallbladder shows no evidence of stones there is gallbladder wall thickening and pericholecystic edema and sludge within the gallbladder. Common bile duct is not enlarged. 1. Cirrhotic liver with possible recannulated emboli: Vein. 2. Marked periportal pericaval adenopathy. Follow-up with CT scan is recommended. 3. Gallbladder sludge with gallbladder wall thickening and pericholecystic edema. Acalculous cholecystitis is a possibility. **This report has been created using voice recognition software. It may contain minor errors which are inherent in voice recognition technology. ** Final report electronically signed by Dr. Geni Bautista on 9/15/2017 7:23 PM    Xr Chest Portable    Result Date: 9/15/2017  PROCEDURE: XR CHEST PORTABLE CLINICAL INFORMATION: NG tube placement,  .  COMPARISON: 9/15/2017 at 1740 TECHNIQUE: Portable supine FINDINGS: NG tube is been advanced and is well into the stomach. Distal tip is not included on the image. There are no other changes. NG tube is well into the stomach **This report has been created using voice recognition software. It may contain minor errors which are inherent in voice recognition technology. ** Final report electronically signed by Dr. Esperanza Amador on 9/15/2017 7:10 PM    Xr Chest Portable    Result Date: 9/15/2017  PROCEDURE: XR CHEST PORTABLE CLINICAL INFORMATION: NG placement,  . COMPARISON: 9/15/2017 TECHNIQUE: Portable supine FINDINGS: NG tube extends to the distal esophagus. It needs to be advanced 7 cm to be in the proximal stomach. There are no other changes. NG tube in the distal esophagus **This report has been created using voice recognition software. It may contain minor errors which are inherent in voice recognition technology. ** Final report electronically signed by Dr. Esperanza Amador on 9/15/2017 6:10 PM    Xr Chest Portable    Result Date: 9/15/2017  PROCEDURE: XR CHEST PORTABLE CLINICAL INFORMATION: AMS, . COMPARISON: PA and lateral chest x-ray December 5, 2015. TECHNIQUE: AP upright view of the chest. FINDINGS: The heart size remains mildly enlarged. The mediastinum is not widened. There is mildly increased density in the right lower lobe mostly in the right infrahilar region. There is obscuration to moderate degree the right lateral costophrenic angle. The rest of the lungs are clear. The pulmonary vascularity is normal. No suspicious osseous lesions are present. 1.  Findings consistent with small to moderate size right pleural effusion with underlying atelectasis. Also in the differential would be early infiltrate but this is less favored. Recommend a true PA and lateral chest x-ray preferably in the x-ray department if patient can tolerate. **This report has been created using voice recognition software.  It may contain minor errors which are inherent in voice recognition technology. ** Final report electronically signed by Dr. Rodney Chavez on 9/15/2017 9:14 AM    Us Abdominal Limited    Result Date: 9/15/2017  PROCEDURE: US ABDOMINAL LIMITED CLINICAL INFORMATION: Cirrhosis with ascites,  . COMPARISON: None available. TECHNIQUE: Dorthea  scale sonographic imaging of the 4 quadrants of the abdomen performed for localization of ascites. FINDINGS: There is a trace amount of free fluid deep to the abdominal wall at the left midabdomen. No large fluid collection is identified. Trace amount of fluid within the abdomen. Insufficient quantity of ascites to safely perform an ultrasound-guided paracentesis. **This report has been created using voice recognition software. It may contain minor errors which are inherent in voice recognition technology. ** Final report electronically signed by Dr. Megan Packer on 9/15/2017 5:13 PM       Physical Exam:  Vitals: BP (!) 127/59  Pulse 51  Temp (!) 90.9 °F (32.7 °C) (Rectal)   Resp 16  Ht 5' 1\" (1.549 m)  Wt 151 lb 6.4 oz (68.7 kg)  SpO2 97%  BMI 28.61 kg/m2  24 hour intake/output:    Intake/Output Summary (Last 24 hours) at 09/21/17 1108  Last data filed at 09/21/17 0407   Gross per 24 hour   Intake             2180 ml   Output              250 ml   Net             1930 ml     Last 3 weights: Wt Readings from Last 3 Encounters:   09/21/17 151 lb 6.4 oz (68.7 kg)   02/16/17 131 lb (59.4 kg)   12/10/15 122 lb (55.3 kg)       General:  Awake, alert, not in distress. HEENT: Atraumatic, normocephalic. Pink conjunctiva, Anicteric sclera. Pink and moist oral mucosa. Trachea midline. Neck supple. Chest: Bilateal air entry, Clear to auscultation, no wheezing, rhonchi or rales. Cardiovascular: RRR, O1Z8, + murmur, click, rub or gallop. No lower extremity edema. Pedal and posterior tibialis 2+. Abdomen: Soft, non tender to palpation. Active bowel sounds x 4 quadrants. Musculoskeletal: passive and active ROM x 4 extremities.   Integumentary: Pink, warm and dry. Free from rash or lesions. CNS: Oriented to person only. Cranial nerves 2-12 grossly intact. Speech clear. Normal sensation. Face symmetrical. No tremor. Psych: Confused                      Assessment / Plan:    1. Hypercalcemia with elevated PTH, suggesting possible primary hyperparathyroidism. Received Calcitonin SQ x 1 and daily Cinacalcet 30 mg starting 9/18. Calcium this Am 10. Normal urinary calcium. Vitamin D 1.25 dihydroxy pending. Sestamibi scan pushed to Tuesday next week. 2. Bradycardia and hypothermia. Received Solumedrol 9/20. Check random cortisol level. 3. Hypothyrodisim. PO synthroid. TSH 2.410, FT4 1. 12.  4. Hypovitaminosis D. Vit D 10. Do not recommend replacement due to #1.   5. Cirrhosis with hepatic encephalopathy. GI following. Lactulose. Ammonia level has improved. 6. Encephalopathy, multifactorial with #1/4/5.  7. Right lower lobe pneumonia, Pulmonary following. 8. Right pleural effusion. 9. Acute kidney injury, nephrology consulted. Dispo: Discussed case with palliative care.  Obtain random cortisol, if low will obtain ACTH stim tomorrow AM.    Electronically signed by Bryce Ngo CNP on 9/21/2017 at 11:08 AM  Rounding for Dr. Kamilah Alamo, Endocrinologist.

## 2017-09-21 NOTE — PROGRESS NOTES
Spoke to Nuclear Medicine. They stated they had an order for the parathyroid scan to be done as outpatient on Tuesday. They stated since she was having the HIDA today she wouldn't be able to have it done until at least Monday. Informed Nancy Loaiza, she stated Tues was fine.

## 2017-09-21 NOTE — CONSULTS
[address] [address]  General Surgery Consult Note  Dr. Halle Tellez  Pt Name: Yasemin Nolasco  MRN: [id number]  YOB: 1940  Date of evaluation: 9/21/2017  Primary Care Physician: Saul Lorenzana  Patient evaluated at the request of  Dr. Catherine Goode  Reason for evaluation: abnormal CT abdomen  IMPRESSIONS:     Active Hospital Problems    Diagnosis Date Noted    Hepatic encephalopathy ([facility] [K72.90]     Acquired hypothyroidism [E03.9]     Hypovitaminosis D [E55.9]     Pneumonia, organism unspecified [J18.9]     BEN (acute kidney injury) ([facility] [N17.9]     Dehydration [E86.0]     Acute renal failure, not POA [N17.9] 09/17/2017    Hypercalcemia, not POA [E83.52] 09/16/2017    Hypertensive urgency [I16.0] 09/15/2017    Urethrovaginal fistula [N82.1] 09/15/2017    Pleural effusion [J90] 09/15/2017    Acute hepatic encephalopathy [K72.00] 09/15/2017    Thrombocytopenia ([facility] [D69.6] 09/15/2017    Microcytic anemia [D50.9] 09/15/2017    Hyperbilirubinemia [E80.6] 09/15/2017    Hypokalemia [E87.6] 09/15/2017    Urinary retention [R33.9]     Primary biliary cirrhosis ([facility] [K74.3] 04/15/2014    History of atrial fibrillation [Z86.79] 04/10/2014    Essential hypertension [I10]     Cirrhosis of liver with ascites ([facility] [facility] [K74.60]     Hypothyroidism [E03.9]      PLANS:   1. Was not c/o any abd pain  2. Prior to NPO for HIDA was amie diet  3. HIDA is normal no evidence of cholecystitis  4. Known cirrhotic with ascites and portal htn, and varices  5. Came in with AMS and elevated NH3  6. Is confused  7. Has chronic elevated bilirubin from PBC  8. Has sludge in GB on CT scan  9. Has thrombocytopenia and anemia  10. 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 Was seen by Dr Delilah Herrmann in 2014 for this hernia and observation recommended  12.  If would need surgery, she is high risk for complications related to all her comorbid conditions, and severe liver Historical Provider, MD    Scheduled Meds:   vitamin D  5,000 Units Oral Daily    rifaximin  550 mg Oral BID    levothyroxine  112 mcg Oral Daily    ferrous sulfate  325 mg Oral BID WC    methylPREDNISolone (SOLU-MEDROL) IVPB  250 mg Intravenous Daily    ursodiol  300 mg Oral BID    piperacillin-tazobactam  3.375 g Intravenous Q8H    cinacalcet  30 mg Oral Daily    famotidine  20 mg Oral Daily    ciprofloxacin  500 mg Oral Q24H    amLODIPine  10 mg Oral Daily    metroNIDAZOLE  500 mg Oral 4 times per day    metoprolol tartrate  50 mg Oral BID    nicotine  1 patch Transdermal Daily    phosphorus replacement protocol   Other RX Placeholder    lactulose  20 g Oral TID    sodium chloride flush  10 mL Intravenous 2 times per day     Continuous Infusions:   sodium chloride 100 mL/hr at 09/20/17 2129     PRN Meds:.melatonin, haloperidol lactate, acetaminophen, magnesium hydroxide, ondansetron, potassium chloride, magnesium sulfate, labetalol, sodium chloride flush  Allergies  Review of patient's allergies indicates no known allergies. Family History  Family History   Problem Relation Age of Onset    Heart Disease Mother     Diabetes Mother     Arthritis Mother     High Blood Pressure Mother     Prostate Cancer Father     Arthritis Father     High Blood Pressure Father     Diabetes Sister     Cancer Sister      pancreatic    Heart Disease Maternal Grandfather     Cancer Sister      pancreatic    Heart Disease Sister     Diabetes Sister     High Blood Pressure Sister      Social History  Social History     Social History    Marital status:       Spouse name: N/A    Number of children: N/A    Years of education: N/A     Occupational History    retired      Social History Main Topics    Smoking status: Current Some Day Smoker     Packs/day: 0.25     Years: 42.00     Types: Cigarettes    Smokeless tobacco: Never Used    Alcohol use No    Drug use: No    Sexual activity: No Other Topics Concern    Not on file     Social History Narrative     Review of Systems:  Could not be evaluated by me because of her mental status, chart review revealed no hx of GB sxs  OBJECTIVE:   CURRENT VITALS:  height is 5' 1\" (1.549 m) and weight is 151 lb 6.4 oz (68.7 kg). Her oral temperature is 97.2 °F (36.2 °C). Her blood pressure is 131/60 and her pulse is 51. Her respiration is 18 and oxygen saturation is 96%. Body mass index is 28.61 kg/(m^2). Temperature Range (24h):Temp: 97.2 °F (36.2 °C) Temp  Av.4 °F (36.3 °C)  Min: 97.2 °F (36.2 °C)  Max: 97.5 °F (36.4 °C)  BP Range (62Y): Systolic (91BLZ), JYF:274 , Min:116 , CXB:739     Diastolic (56QFU), MGD:85, Min:53, Max:60    Pulse Range (24h): Pulse  Av  Min: 45  Max: 58  Respiration Range (24h): Resp  Av.3  Min: 12  Max: 18  Current Pulse Ox (24h):  SpO2: 96 %  Pulse Ox Range (24h):  SpO2  Av.2 %  Min: 95 %  Max: 97 %  Oxygen Amount and Delivery:    CONSTITUTIONAL: fatigued, confused, distracted, no apparent distress, appears older than stated age and normal weight  SKIN: Skin color, texture, turgor normal. No rashes or lesions. LYMPH: no cervical nodes, no inguinal nodes  HEENT: Head is normocephalic, atraumatic. EOMI, PERRLA. NECK: Supple, symmetrical, trachea midline, no adenopathy, thyroid symmetric, not enlarged and no tenderness, skin normal.  CHEST/LUNGS: chest symmetric with normal A/P diameter, normal respiratory rate and rhythm, lungs clear to auscultation without wheezes, rales or rhonchi. No accessory muscle use. Scars None   CARDIOVASCULAR: Heart sounds are normal.  Regular rate and rhythm without murmur, gallop or rub. Normal S1 and S2. . Carotid and femoral pulses 2+/4 and equal bilaterally. ABDOMEN: Normal shape. large midline scar scar(s) present. Normal bowel sounds. No bruits. scars from previous incisions . incisional hernia. Percussion: Normal without hepatosplenomegally. Tenderness: absent.   RECTAL:

## 2017-09-21 NOTE — PLAN OF CARE
Problem: Falls - Risk of  Goal: Absence of falls  Outcome: Ongoing  Patient free of falls. Call light within reach. Bed in lowest position. Nonskid socks on. Bed alarm on. Patient able to ambulate with the assist of two. Hourly rounding continues. Problem: Nutrition  Goal: Optimal nutrition therapy  Outcome: Ongoing  Patient appetite worsening and patient barely eating. Will continue to monitor. Problem: Risk for Impaired Skin Integrity  Goal: Tissue integrity - skin and mucous membranes  Structural intactness and normal physiological function of skin and  mucous membranes. Outcome: Ongoing  Patient has generalized bruising located on body. Will continue to monitor. Problem: Discharge Planning:  Goal: Discharged to appropriate level of care  Discharged to appropriate level of care   Outcome: Ongoing  Patient plans to be discharged to TCU or an ECF. Appropriate for her level of care. Will continue to monitor. Problem: Anxiety/Stress:  Goal: Level of anxiety will decrease  Level of anxiety will decrease   Outcome: Ongoing  Patient very anxious and not resting well. Melatonin given to patient and did not help. Haldol also given to patient and did not help with restlessness. Will continue to monitor. Problem: Aspiration:  Goal: Absence of aspiration  Absence of aspiration   Outcome: Ongoing  Patient passed swallow evaluation. No signs of aspiration present. Problem: Bowel Function - Altered:  Goal: Bowel elimination is within specified parameters  Bowel elimination is within specified parameters   Outcome: Ongoing  Patient only having smears. Patient incontinent of stool. Receiving lactulose. Will continue to monitor bowel function. Problem: Cardiac Output - Decreased:  Goal: Hemodynamic stability will improve  Hemodynamic stability will improve   Outcome: Ongoing  Patient's vitals stable. Patient remains normal sinus with heart rate in the 40-60s.  Patient has normal saline running at

## 2017-09-21 NOTE — PROGRESS NOTES
Paged Dr. Madyson Riley to inform him of the low urine output. He stated to flush the huerta one time to make sure it wasn't occluded and to give a 250mL bolus. Orders placed in Epic.

## 2017-09-21 NOTE — PROGRESS NOTES
Hospital Medicine Progress Note     Date:  9/21/2017    PCP: Richard Wilburn (Tel: [phone])    Date of Admission: 9/15/2017      Brief hospital course: Patient with history of CASTANON and cirrhosis, transferred with report of altered mental status, uncontrolled hypertension with SBP in the 200s. She has history of atrial fibrillation, not on anticoagulation due to thrombocytopenia. Assessment:  Principal Problem:    Acute hepatic encephalopathy  Active Problems:    Hypertensive urgency    Pleural effusion    Cirrhosis of liver with ascites (HCC)    Essential hypertension    Hypothyroidism    History of atrial fibrillation    Primary biliary cirrhosis (HCC)    Urethrovaginal fistula    Thrombocytopenia (HCC)    Microcytic anemia    Hyperbilirubinemia    Hypokalemia    Urinary retention    Hypercalcemia, not POA    Acute renal failure, not POA    Pneumonia, organism unspecified    BEN (acute kidney injury) ([facility]    Dehydration    Acquired hypothyroidism    Hypovitaminosis D    Hepatic encephalopathy ([facility] [address] [facility]      Plan:  1. Hepatic encephalopathy. Elevated ammonia on admission; however, ammonia normal with lactulose. Confusion improving, albeit slowly. Hx of PBC, on ursodiol per hx of PBC. GI assisting with management. 2. Biliary sludge; cannot exclude acalculous cholecystitis. Was on cipro and flagyl per GI, which has been discontinued as of 9/21. HIDA scan pending per general surgery. 3. Bacterial pneumonia, aspiration suspected. CT-chest with findings concerning for aspiration pneumonia, likely POA. Currently on zosyn. CXR 9/21, slight worsening of infiltrates. On zosyn and ID/pulm on board. 4. Hypothermia, diagnosed 9/21. Suspect underlying sepsis syndrome. Repeat cultures ordered UA reviewed; not consistent with infection. CXR with worsening infiltrate. Follow up HIDA scan. Check stool for C diff. ID assisting with management. 5. Right pleural effusion, small and likely parapneumonic.  No significant ascites amenable to paracentesis on abdominal US to completely associate with hepatic hydrothorax. Needs repeat CXR in a few weeks. 6. Accelerated hypertension/hypertensive urgency. Was on labetalol infusion, which has been titrated off. Now bradycardic - changed metoprolol to hydralazine; continue norvasc. 7. Acute renal failure, not POA. Likely secondary to pre-renal azotemia vs hypercalcemia vs interstitial nephritis. Nephrology assisting with care - on IV solumedrol. 8. Hypercalcemia, resolved. PTH elevated, vitamin D low; likely primary hyperparathyroidism. Endocrinology on board - planning sestamibi scan in near future. 9. Hx of afib, not on anticoagulation per chronic thrombocytopenia and bleeding risk. 10. Urinary retention. Aguilar catheter had to be placed by urology; to follow up with urologist as outpatient. 11. Urethrovaginal fistula. Surgery consulted to assist with eval.  12. Hypothyroidism. Continue synthroid. Diet: DIET CARDIAC; Dietary Nutrition Supplements: Low Calorie High Protein Supplement    Code status: Full Code     ----------  Subjective  Confused. Not endorsing any symptoms. Objective  Physical exam:  Vitals: BP (!) 127/59  Pulse 51  Temp 93 °F (33.9 °C) (Rectal)   Resp 16  Ht 5' 1\" (1.549 m)  Wt 151 lb 6.4 oz (68.7 kg)  SpO2 97%  BMI 28.61 kg/m2  Gen: Not in distress. Awake. Remains confused. Head: Normocephalic. Atraumatic. Eyes: EOMI. Good acuity. ENT: Oral mucosa dry  Neck: No JVD. No obvious thyromegaly. CVS: Nml S1S2, no MRG. Bradycardic  Pulmomary: Clear bilaterally. No crackles. No wheezes. Gastrointestinal: Ventral abdominal hernia. Soft, non-tender, nondistended  Musculoskeletal: Trace pedal edema. Warm  Neuro: No focal deficit. Moves extremity spontaneously. Psychiatry: Appropriate affect. Not agitated. Skin: Warm, dry with normal turgor.  No rash    24HR INTAKE/OUTPUT:      Intake/Output Summary (Last 24 hours) at 09/21/17 0646  Last data

## 2017-09-21 NOTE — FLOWSHEET NOTE
Message sent again to Dr. Kate Childress him of an even lower temp 90.9 rectally. He called back and stated start the blanche hugger, consult ID and get another set of blood cultures. Orders placed in Epic.

## 2017-09-21 NOTE — PLAN OF CARE
Problem: Falls - Risk of  Goal: Absence of falls  Outcome: Ongoing  Hourly rounding continues. Problem: Nutrition  Goal: Optimal nutrition therapy  Outcome: Ongoing  Patient NPO today, meals encouraged when able to eat    Problem: Risk for Impaired Skin Integrity  Goal: Tissue integrity - skin and mucous membranes  Structural intactness and normal physiological function of skin and  mucous membranes. Outcome: Ongoing  Skin intact, continue to turn patient. Problem: Discharge Planning:  Goal: Participates in care planning  Participates in care planning   Outcome: Ongoing  Family participates in care, planning for nursing home  Goal: Discharged to appropriate level of care  Discharged to appropriate level of care   Outcome: Ongoing  Planning for nursing home at this time    Problem: Airway Clearance - Ineffective:  Goal: Ability to maintain a clear airway will improve  Ability to maintain a clear airway will improve   Outcome: Ongoing  Patient able to cough, will continue to assess. Problem: Anxiety/Stress:  Goal: Level of anxiety will decrease  Level of anxiety will decrease   Outcome: Ongoing  Patient sleepy today    Problem: Aspiration:  Goal: Absence of aspiration  Absence of aspiration   Outcome: Ongoing  No signs of aspiration today, will continue to assess. Problem:  Bowel Function - Altered:  Goal: Bowel elimination is within specified parameters  Bowel elimination is within specified parameters   Outcome: Ongoing  No bowel movement today    Problem: Cardiac Output - Decreased:  Goal: Hemodynamic stability will improve  Hemodynamic stability will improve   Outcome: Ongoing   Temp low today, blanche adame on to increase temp    Problem: Mental Status - Impaired:  Goal: Mental status will be restored to baseline  Mental status will be restored to baseline   Outcome: Not Met This Shift  Patient only alert to name and birthday    Problem: Nutrition Deficit:  Goal: Ability to achieve adequate nutritional intake will improve  Ability to achieve adequate nutritional intake will improve   Outcome: Ongoing  Meals encouraged    Problem: DISCHARGE BARRIERS  Goal: Patients continuum of care needs are met  Outcome: Completed Date Met:  09/21/17    Problem: Emotional Distress  Goal: Verbalization of thoughts and feelings  Outcome: Ongoing  Patient sleepy today. Problem: Musculor/Skeletal Functional Status  Goal: Highest potential functional level  Outcome: Ongoing  PT/OT continues. Comments:   Care plan reviewed with family. Family verbalize understanding of the plan of care and contribute to goal setting.

## 2017-09-21 NOTE — PROGRESS NOTES
Hospital Follow Up Note  JAQUAN Mazariegos 1940 9/21/2017 10:28 AM  S    Per RN, temp 93, having some bradycardia. Still confused and more drowsy today         O.    Vitals:    09/21/17 0945   BP:    Pulse:    Resp:    Temp: 93.1 °F (33.9 °C)   SpO2:             Drowsy          Heart  RRR         Lungs CTAB          ABD S/NT/ND/+BS + ventral hernia          Ext No LLE     A. 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       Cirrhosis, acutely decompensated  NGT   Cholestasis without Jaundice  Hepatic encephalopathy  Ammonia normal 9-19-17. Was 143 on admit   Lactulose, Xifaxan   Actigall       Ascites - trace only, too little to tap   Venous insufficiency  Hypoalbuminemia  Elevated alk phos  Pancytopenia      Anemia, micro - AR by labs   Oral iron supplementation   Thrombocytopenia  Elevated INR      Infection? - possible acalculous cholecystitis on U/S  Cipro/flagyl 9-15-17      AFP 2.2       Tumor? - imaging without mass   GI bleeding? - no signs of hemorrhage  Thrombosis? - no, dopplers normal  Alcoholic hepatitis? - no signs  Med Noncompliance? - probable  Diet Noncompliance? - no  Sedatives? - no      Hypertension   Hypothyroidism, endocrinology following    Hypercalcemia   Acute kidney injury, nephrology following. Stopped PPI added pepcid       Right pleural effusion, pulmonology following likely hepatic hydrothorax   Pneumonia         P.  Continue lactulose   Continue Actigall for PBC    Continue Cipro and Flagyl for possible acalculous cholecystitis per US   Continue oral iron supplementation   Check Ammonia level    Follow HIDA scan results from today scheduled for 1400, If HIDA negative then will stop antibiotics   Follow       A&P discussed with Dr Robe Muñoz, CNP

## 2017-09-21 NOTE — PROGRESS NOTES
Renal Progress Note    Assessment and Plan: 1. Acute kidney injury stable for the most part  2. Deconditioning   3. Dehydration  4. Anemia   5. Thrombocytopenia  6. Mental confusion  7. Resolving hypercalcemia   PLAN:  Reviewed labs   Reviewed medications    Continue current intravenous fluid  Labs in the morning  Kidney ultrasound  Repeat urine eosinophils  Suggest either psychiatric or neurology evaluation  Discussed with the staff  We'll follow      Patient Active Problem List:     GIST (gastrointestinal stromal tumor), non-malignant     Essential hypertension     Cirrhosis of liver with ascites ([facility] [address]     Cancer (HCC)     Hypothyroidism     Dizzy spells     Fever     Epigastric pain     History of atrial fibrillation     Primary biliary cirrhosis (HCC)     Hypertensive urgency     Urethrovaginal fistula     Pleural effusion     Acute hepatic encephalopathy     Thrombocytopenia (HCC)     Microcytic anemia     Hyperbilirubinemia     Hypokalemia     Urinary retention     Hypercalcemia, not POA     Acute renal failure, not POA     Pneumonia, organism unspecified     BEN (acute kidney injury) ([address]     Dehydration     Acquired hypothyroidism     Hypovitaminosis D     Hepatic encephalopathy (HCC)      Subjective:   Admit Date: 9/15/2017    Interval History:   Seeing for acute kidney injury  Very sleepy when I saw her  Updated by the staff  Did not sleep well last night and has not slept very much since she came to the hospital  Just went to sleep about one hour ago according to the staff  Blood pressure is okay  Urine output is marginal      Medications:   Scheduled Meds:   hydrALAZINE  25 mg Oral 3 times per day    vitamin D  5,000 Units Oral Daily    rifaximin  550 mg Oral BID    levothyroxine  112 mcg Oral Daily    ferrous sulfate  325 mg Oral BID WC    ursodiol  300 mg Oral BID    piperacillin-tazobactam  3.375 g Intravenous Q8H    cinacalcet  30 mg Oral Daily    famotidine  20 mg Oral Daily    ciprofloxacin clear   Neck:supple with no thyromegally  Cardiovascular:  S1, S2 without m/r/g   Respiratory:  CTA B without w/r/r   Abdomen: +bs, soft,   Ext: No LE edema  Musculoskeletal:Intact  Neuro:Deferred      Electronically signed by Naresh Fleming MD on 9/21/2017 at 1:02 PM

## 2017-09-21 NOTE — CONSULTS
sulfate  325 mg Oral BID     ursodiol  300 mg Oral BID    piperacillin-tazobactam  3.375 g Intravenous Q8H    cinacalcet  30 mg Oral Daily    famotidine  20 mg Oral Daily    ciprofloxacin  500 mg Oral Q24H    amLODIPine  10 mg Oral Daily    metroNIDAZOLE  500 mg Oral 4 times per day    nicotine  1 patch Transdermal Daily    phosphorus replacement protocol   Other RX Placeholder    lactulose  20 g Oral TID    sodium chloride flush  10 mL Intravenous 2 times per day     Continuous Infusions:   sodium chloride 100 mL/hr at 09/21/17 0812     PRN Meds:melatonin, haloperidol lactate, acetaminophen, magnesium hydroxide, ondansetron, potassium chloride, magnesium sulfate, labetalol, sodium chloride flush  Allergies:   ALLERGIES: Review of patient's allergies indicates no known allergies. SOCIAL HISTORY:     TOBACCO:   reports that she has been smoking Cigarettes. She has a 10.50 pack-year smoking history. She has never used smokeless tobacco.     ETOH:   reports that she does not drink alcohol. Unable to get much hx from patient      FAMILY HISTORY:         Problem Relation Age of Onset    Heart Disease Mother     Diabetes Mother    [facility] Arthritis Mother     High Blood Pressure Mother     Prostate Cancer Father     Arthritis Father     High Blood Pressure Father     Diabetes Sister     Cancer Sister      pancreatic    Heart Disease Maternal Grandfather     Cancer Sister      pancreatic    Heart Disease Sister     Diabetes Sister     High Blood Pressure Sister        REVIEW OF SYSTEMS:   Unable to get much hx from patient    PHYSICAL EXAM:     BP (!) 127/59  Pulse 51  Temp 93 °F (33.9 °C) (Rectal)   Resp 16  Ht 5' 1\" (1.549 m)  Wt 151 lb 6.4 oz (68.7 kg)  SpO2 97%  BMI 28.61 kg/m2  General: chronically sick looking  HEENT: pale conjunctiva, icteric sclera, dry oral mucosa. Chest:  Bilateral air entry  Cardiovascular:  RRR ,S1S2,   Abdomen:  Soft, non tender to palpation. incisional hernia  Extremities: no rash  Skin:  Warm and dry. CNS:lethargic        LABS:     CBC:   Recent Labs      09/19/17   0735  09/20/17   0616  09/21/17   0452   WBC  8.0  7.7  8.6   HGB  9.8*  10.2*  10.1*   PLT  58*  72*  80*     BMP:    Recent Labs      09/19/17   0735  09/20/17   0616  09/21/17   0452   NA  145  141  138   K  4.2  4.1  4.6   CL  105  106  106   CO2  20*  19*  17*   BUN  51*  53*  61*   CREATININE  1.8*  2.0*  2.1*   GLUCOSE  111*  121*  122*     Calcium:  Recent Labs      09/21/17   0452   CALCIUM  10.0     Ionized Calcium:No results for input(s): IONCA in the last 72 hours. Magnesium:  Recent Labs      09/21/17   0452   MG  2.0     Phosphorus:  Recent Labs      09/21/17   0452   PHOS  4.4     BNP:No results for input(s): BNP in the last 72 hours. Glucose:No results for input(s): POCGLU in the last 72 hours. HgbA1C: No results for input(s): LABA1C in the last 72 hours. INR: No results for input(s): INR in the last 72 hours. Hepatic:   Recent Labs      09/21/17   0452   ALKPHOS  158*   ALT  20   AST  44*   PROT  5.4*   BILITOT  1.9*   LABALBU  2.1*     Amylase and Lipase:No results for input(s): LACTA, AMYLASE in the last 72 hours. Lactic Acid: No results for input(s): LACTA in the last 72 hours. Troponin: No results for input(s): CKTOTAL, CKMB, TROPONINI in the last 72 hours. BNP: No results for input(s): BNP in the last 72 hours. Lipids: No results for input(s): CHOL, TRIG, HDL, LDLCALC in the last 72 hours.     Invalid input(s): LDL  ABGs: No results found for: PHART, PO2ART, REB4XON, HFP0ZMC, BEART    Cultures:      CXR:       UA:   Recent Labs      09/21/17   1300   PHUR  5.0   COLORU  DK YELLOW*   PROTEINU  100*   BLOODU  NEGATIVE   RBCUA  15-20   WBCUA  10-15   BACTERIA  NONE   NITRU  NEGATIVE   GLUCOSEU  NEGATIVE   BILIRUBINUR  NEGATIVE   UROBILINOGEN  0.2   KETUA  NEGATIVE         IMAGING:    Micro:   Lab Results   Component Value Date    BC No growth-preliminary  No growth

## 2017-09-21 NOTE — PLAN OF CARE
Problem: Discharge Planning:  Goal: Participates in care planning  Participates in care planning   Outcome: Ongoing  1610: Updated on pt status by Reyes Valdes. Pt was with low temp and intermittent bradycardia this am. Pt is currently off unit at this time in xray. Family here at this time and reviewed status and goals of care. Again reviewed pt status and benefits and risks of treatment options including code status. At this time family request Limited Code: NO SHOCK, NO CPR, NO INTUBATION, NO RESUSCITATIVE MEDS. Family would like to continue medical interventions at this time and see how pt does. Dr. Maame Butcher texted request for code status change. Will continue to follow and support pt and family. Lina Hoffman RN updated on conversation with family and their request for code status change.

## 2017-09-22 LAB
ALBUMIN SERPL-MCNC: 2.2 G/DL (ref 3.5–5.1)
ALLEN TEST: POSITIVE
ALP BLD-CCNC: 171 U/L (ref 38–126)
ALT SERPL-CCNC: 18 U/L (ref 11–66)
ANION GAP SERPL CALCULATED.3IONS-SCNC: 19 MEQ/L (ref 8–16)
ANISOCYTOSIS: ABNORMAL
AST SERPL-CCNC: 27 U/L (ref 5–40)
BASE EXCESS (CALCULATED): -4.8 MMOL/L (ref -2.5–2.5)
BASOPHILS # BLD: 0.3 %
BASOPHILS ABSOLUTE: 0 THOU/MM3 (ref 0–0.1)
BILIRUB SERPL-MCNC: 1.8 MG/DL (ref 0.3–1.2)
BUN BLDV-MCNC: 66 MG/DL (ref 7–22)
CALCIUM SERPL-MCNC: 10.2 MG/DL (ref 8.5–10.5)
CHLORIDE BLD-SCNC: 107 MEQ/L (ref 98–111)
CO2: 16 MEQ/L (ref 23–33)
COLLECTED BY:: ABNORMAL
CORTISOL COLLECTION INFO: NORMAL
CORTISOL: 67.46 UG/DL
CREAT SERPL-MCNC: 2.5 MG/DL (ref 0.4–1.2)
DEVICE: ABNORMAL
EOSINOPHIL # BLD: 0 %
EOSINOPHILS ABSOLUTE: 0 THOU/MM3 (ref 0–0.4)
GFR SERPL CREATININE-BSD FRML MDRD: 19 ML/MIN/1.73M2
GLUCOSE BLD-MCNC: 120 MG/DL (ref 70–108)
HCO3: 19 MMOL/L (ref 23–28)
HCT VFR BLD CALC: 32.6 % (ref 37–47)
HEMOGLOBIN: 10.7 GM/DL (ref 12–16)
HYPOCHROMIA: ABNORMAL
LYMPHOCYTES # BLD: 2.2 %
LYMPHOCYTES ABSOLUTE: 0.2 THOU/MM3 (ref 1–4.8)
MAGNESIUM: 2.1 MG/DL (ref 1.6–2.4)
MCH RBC QN AUTO: 25.6 PG (ref 27–31)
MCHC RBC AUTO-ENTMCNC: 32.7 GM/DL (ref 33–37)
MCV RBC AUTO: 78.2 FL (ref 81–99)
MICROCYTES: ABNORMAL
MONOCYTES # BLD: 9.2 %
MONOCYTES ABSOLUTE: 1 THOU/MM3 (ref 0.4–1.3)
NUCLEATED RED BLOOD CELLS: 0 /100 WBC
O2 SATURATION: 97 %
PCO2: 30 MMHG (ref 35–45)
PDW BLD-RTO: 21.7 % (ref 11.5–14.5)
PH BLOOD GAS: 7.41 (ref 7.35–7.45)
PHOSPHORUS: 4.9 MG/DL (ref 2.4–4.7)
PLATELET # BLD: 98 THOU/MM3 (ref 130–400)
PMV BLD AUTO: 9.6 MCM (ref 7.4–10.4)
PO2: 88 MMHG (ref 71–104)
POTASSIUM SERPL-SCNC: 4.4 MEQ/L (ref 3.5–5.2)
RBC # BLD: 4.17 MILL/MM3 (ref 4.2–5.4)
RBC # BLD: NORMAL 10*6/UL
SEG NEUTROPHILS: 88.3 %
SEGMENTED NEUTROPHILS ABSOLUTE COUNT: 9.2 THOU/MM3 (ref 1.8–7.7)
SODIUM BLD-SCNC: 142 MEQ/L (ref 135–145)
SOURCE, BLOOD GAS: ABNORMAL
TOTAL PROTEIN: 5.4 G/DL (ref 6.1–8)
WBC # BLD: 10.4 THOU/MM3 (ref 4.8–10.8)

## 2017-09-22 PROCEDURE — 82803 BLOOD GASES ANY COMBINATION: CPT

## 2017-09-22 PROCEDURE — 83735 ASSAY OF MAGNESIUM: CPT

## 2017-09-22 PROCEDURE — 36600 WITHDRAWAL OF ARTERIAL BLOOD: CPT

## 2017-09-22 PROCEDURE — 85025 COMPLETE CBC W/AUTO DIFF WBC: CPT

## 2017-09-22 PROCEDURE — 2140000000 HC CCU INTERMEDIATE R&B

## 2017-09-22 PROCEDURE — 2580000003 HC RX 258: Performed by: INTERNAL MEDICINE

## 2017-09-22 PROCEDURE — 99232 SBSQ HOSP IP/OBS MODERATE 35: CPT | Performed by: INTERNAL MEDICINE

## 2017-09-22 PROCEDURE — 99231 SBSQ HOSP IP/OBS SF/LOW 25: CPT | Performed by: NURSE PRACTITIONER

## 2017-09-22 PROCEDURE — 6370000000 HC RX 637 (ALT 250 FOR IP): Performed by: ANESTHESIOLOGY

## 2017-09-22 PROCEDURE — 84100 ASSAY OF PHOSPHORUS: CPT

## 2017-09-22 PROCEDURE — 82533 TOTAL CORTISOL: CPT

## 2017-09-22 PROCEDURE — 36415 COLL VENOUS BLD VENIPUNCTURE: CPT

## 2017-09-22 PROCEDURE — 6360000002 HC RX W HCPCS: Performed by: INTERNAL MEDICINE

## 2017-09-22 PROCEDURE — 80053 COMPREHEN METABOLIC PANEL: CPT

## 2017-09-22 RX ORDER — 0.9 % SODIUM CHLORIDE 0.9 %
500 INTRAVENOUS SOLUTION INTRAVENOUS ONCE
Status: COMPLETED | OUTPATIENT
Start: 2017-09-22 | End: 2017-09-22

## 2017-09-22 RX ADMIN — PIPERACILLIN SODIUM,TAZOBACTAM SODIUM 3.38 G: 3; .375 INJECTION, POWDER, FOR SOLUTION INTRAVENOUS at 16:54

## 2017-09-22 RX ADMIN — SODIUM CHLORIDE 500 ML: 9 INJECTION, SOLUTION INTRAVENOUS at 07:34

## 2017-09-22 RX ADMIN — PIPERACILLIN SODIUM,TAZOBACTAM SODIUM 3.38 G: 3; .375 INJECTION, POWDER, FOR SOLUTION INTRAVENOUS at 04:21

## 2017-09-22 RX ADMIN — SODIUM CHLORIDE: 9 INJECTION, SOLUTION INTRAVENOUS at 11:58

## 2017-09-22 RX ADMIN — SODIUM CHLORIDE: 9 INJECTION, SOLUTION INTRAVENOUS at 03:30

## 2017-09-22 ASSESSMENT — PAIN SCALES - GENERAL
PAINLEVEL_OUTOF10: 0
PAINLEVEL_OUTOF10: 0

## 2017-09-22 NOTE — PROGRESS NOTES
Endocrinology Progress Note    Patient:  Rafy Evans      Unit/Bed:3B-24/024-A    YOB: 1940    MRN: [id number]     Acct: [de-identified]     Admit date: 9/15/2017    Patient Seen, Chart, Consults notes, Labs, Radiology studies reviewed. Subjective:   CC: Hypercalcemia. Sleeping. Arouses. Remains confused. Did not answer ROS questions. Diet:  DIET CARDIAC; Dietary Nutrition Supplements: Low Calorie High Protein Supplement    Medications:  Scheduled Meds:   piperacillin-tazobactam  3.375 g Intravenous Q12H    hydrALAZINE  25 mg Oral 3 times per day    vitamin D  5,000 Units Oral Daily    rifaximin  550 mg Oral BID    levothyroxine  112 mcg Oral Daily    ferrous sulfate  325 mg Oral BID WC    ursodiol  300 mg Oral BID    cinacalcet  30 mg Oral Daily    famotidine  20 mg Oral Daily    amLODIPine  10 mg Oral Daily    nicotine  1 patch Transdermal Daily    phosphorus replacement protocol   Other RX Placeholder    lactulose  20 g Oral TID    sodium chloride flush  10 mL Intravenous 2 times per day     Continuous Infusions:   sodium chloride 125 mL/hr at 09/22/17 1158     PRN Meds:melatonin, haloperidol lactate, acetaminophen, magnesium hydroxide, ondansetron, potassium chloride, magnesium sulfate, labetalol, sodium chloride flush    Objective:    CBC:   Recent Labs      09/20/17   0616  09/21/17   0452  09/22/17   0420   WBC  7.7  8.6  10.4   HGB  10.2*  10.1*  10.7*   PLT  72*  80*  98*     BMP:    Recent Labs      09/20/17   0616  09/21/17   0452  09/22/17   0420   NA  141  138  142   K  4.1  4.6  4.4   CL  106  106  107   CO2  19*  17*  16*   BUN  53*  61*  66*   CREATININE  2.0*  2.1*  2.5*   GLUCOSE  121*  122*  120*     Calcium:  Recent Labs      09/22/17   0420   CALCIUM  10.2     Ionized Calcium:No results for input(s): IONCA in the last 72 hours.   Magnesium:  Recent Labs      09/22/17   0420   MG  2.1     Phosphorus:  Recent Labs      09/22/17   0420   PHOS  4.9*     BNP:No results for input(s): BNP in the last 72 hours. Glucose:No results for input(s): POCGLU in the last 72 hours. HgbA1C: No results for input(s): LABA1C in the last 72 hours. INR: No results for input(s): INR in the last 72 hours. Hepatic:   Recent Labs      09/22/17   0420   ALKPHOS  171*   ALT  18   AST  27   PROT  5.4*   BILITOT  1.8*   LABALBU  2.2*     Amylase and Lipase:No results for input(s): LACTA, AMYLASE in the last 72 hours. Lactic Acid: No results for input(s): LACTA in the last 72 hours. Troponin: No results for input(s): CKTOTAL, CKMB, TROPONINT in the last 72 hours. BNP: No results for input(s): BNP in the last 72 hours. Lipids: No results for input(s): CHOL, TRIG, HDL, LDLCALC in the last 72 hours. Invalid input(s): LDL  ABGs:   Lab Results   Component Value Date    PH 7.41 09/22/2017    PCO2 30 09/22/2017    PO2 88 09/22/2017    HCO3 19 09/22/2017    O2SAT 97 09/22/2017       Radiology reports as per the Radiologist  Radiology: Us Liver    Result Date: 9/15/2017  PROCEDURE: US GALLBLADDER RUQ, US LIVER CLINICAL INFORMATION: CIRRHOSIS, HEPATITIS,  . COMPARISON: No prior study. TECHNIQUE: Grayscale and color Doppler ultrasound FINDINGS: Exam is somewhat limited due to patient condition. Liver Liver is somewhat decreased in size. No focal mass or intrahepatic ductal dilatation is seen. There are multiple tortuous vessels at the new hepatis. There is possible recannulated the umbilicus vein. There are multiple hypoechoic masses at the new hepatis indicating periportal pericaval adenopathy. Some of these are as large as 9.7 cm. The gallbladder shows no evidence of stones there is gallbladder wall thickening and pericholecystic edema and sludge within the gallbladder. Common bile duct is not enlarged. 1. Cirrhotic liver with possible recannulated emboli: Vein. 2. Marked periportal pericaval adenopathy. Follow-up with CT scan is recommended.  3. Gallbladder sludge with gallbladder wall **This report has been created using voice recognition software. It may contain minor errors which are inherent in voice recognition technology. ** Final report electronically signed by Dr. Hernan Lanier on 9/15/2017 7:10 PM    Xr Chest Portable    Result Date: 9/15/2017  PROCEDURE: XR CHEST PORTABLE CLINICAL INFORMATION: NG placement,  . COMPARISON: 9/15/2017 TECHNIQUE: Portable supine FINDINGS: NG tube extends to the distal esophagus. It needs to be advanced 7 cm to be in the proximal stomach. There are no other changes. NG tube in the distal esophagus **This report has been created using voice recognition software. It may contain minor errors which are inherent in voice recognition technology. ** Final report electronically signed by Dr. Hernan Lanier on 9/15/2017 6:10 PM    Xr Chest Portable    Result Date: 9/15/2017  PROCEDURE: XR CHEST PORTABLE CLINICAL INFORMATION: AMS, . COMPARISON: PA and lateral chest x-ray December 5, 2015. TECHNIQUE: AP upright view of the chest. FINDINGS: The heart size remains mildly enlarged. The mediastinum is not widened. There is mildly increased density in the right lower lobe mostly in the right infrahilar region. There is obscuration to moderate degree the right lateral costophrenic angle. The rest of the lungs are clear. The pulmonary vascularity is normal. No suspicious osseous lesions are present. 1.  Findings consistent with small to moderate size right pleural effusion with underlying atelectasis. Also in the differential would be early infiltrate but this is less favored. Recommend a true PA and lateral chest x-ray preferably in the x-ray department if patient can tolerate. **This report has been created using voice recognition software. It may contain minor errors which are inherent in voice recognition technology. ** Final report electronically signed by Dr. Bree Bernal on 9/15/2017 9:14 AM    Us Abdominal Limited    Result Date: 9/15/2017  PROCEDURE: Po 149 LIMITED CLINICAL INFORMATION: Cirrhosis with ascites,  . COMPARISON: None available. TECHNIQUE: [facility] scale sonographic imaging of the 4 quadrants of the abdomen performed for localization of ascites. FINDINGS: There is a trace amount of free fluid deep to the abdominal wall at the left midabdomen. No large fluid collection is identified. Trace amount of fluid within the abdomen. Insufficient quantity of ascites to safely perform an ultrasound-guided paracentesis. **This report has been created using voice recognition software. It may contain minor errors which are inherent in voice recognition technology. ** Final report electronically signed by Dr. Lalo Zavala on 9/15/2017 5:13 PM       Physical Exam:  Vitals: BP (!) 116/53  Pulse 60  Temp 95.2 °F (35.1 °C) (Rectal)   Resp 18  Ht 5' 1\" (1.549 m)  Wt 152 lb 9.6 oz (69.2 kg)  SpO2 94%  BMI 28.83 kg/m2  24 hour intake/output:    Intake/Output Summary (Last 24 hours) at 09/22/17 1243  Last data filed at 09/22/17 0421   Gross per 24 hour   Intake          2720.56 ml   Output              290 ml   Net          2430.56 ml     Last 3 weights: Wt Readings from Last 3 Encounters:   09/22/17 152 lb 9.6 oz (69.2 kg)   02/16/17 131 lb (59.4 kg)   12/10/15 122 lb (55.3 kg)       General:  Awake, alert, not in distress. HEENT: Atraumatic, normocephalic. Pink conjunctiva, Anicteric sclera. Pink and moist oral mucosa. Trachea midline. Neck supple. Chest: Bilateal air entry, Clear to auscultation, no wheezing, rhonchi or rales. Cardiovascular: RRR, B9O8, + murmur, click, rub or gallop. No lower extremity edema. Pedal and posterior tibialis 2+. Abdomen: Soft, non tender to palpation. Active bowel sounds x 4 quadrants. Musculoskeletal: passive and active ROM x 4 extremities. Integumentary: Pink, warm and dry. Free from rash or lesions. CNS: Oriented to person only. Cranial nerves 2-12 grossly intact. Speech clear. Normal sensation.  Face symmetrical. No tremor. Psych: Confused                      Assessment / Plan:    1. Hypercalcemia with elevated PTH, suggesting possible primary hyperparathyroidism. Received Calcitonin SQ x 1 and daily Cinacalcet 30 mg starting 9/18. Calcium stable at 10.2. Normal urinary calcium. Vitamin D 1.25 dihydroxy pending. Sestamibi scan scheduled for Tuesday. This should not hold up discharge as she has an outpatient appointment. 2. Bradycardia and hypothermia. Received Solumedrol 9/20. AM cortisol 67.46.   3. Hypothyrodisim. PO synthroid. TSH 2.410, FT4 1. 12.  4. Hypovitaminosis D. Vit D 10. Do not recommend replacement due to #1.   5. Cirrhosis with hepatic encephalopathy. GI following. Lactulose. Ammonia level has improved. 6. Encephalopathy, multifactorial with #1/4/5.  7. Right lower lobe pneumonia, Pulmonary following. 8. Right pleural effusion. 9. Acute kidney injury, nephrology consulted. Dispo: Patient can be discharge from an endocrine standpoint. If still here will see on Monday. Electronically signed by Jojo Chiu CNP on 9/22/2017 at 12:43 PM  Rounding for Dr. Vicky Andrade, Endocrinologist.       Attending addendum:  I have reviewed NP note and relevant data in detail. I agree with findings and plans of care as documented in NP nurse note.     Abdirashid Springer MD  Board-Certified Endocrinologist.

## 2017-09-22 NOTE — PROGRESS NOTES
[facility]  SPEECH THERAPY MISSED TREATMENT NOTE  [facility] CCU-STEPDOWN 3B      Date: 2017  Patient Name: Tawanna Reynaga        MRN: [id number]    : 1940  (68 y.o.)    REASON FOR MISSED TREATMENT:    Pt unable to maintain alertness for functional cognitive therapy. Will attempt  if not discharged over the weekend.     Mayda [id number]

## 2017-09-22 NOTE — PLAN OF CARE
Problem: Falls - Risk of  Goal: Absence of falls  Outcome: Ongoing  Patient free of falls. Call light within reach. Bed in lowest position. Nonskid socks on. Bed alarm on. Patient not ambulating out of bed and is being turned every two hours. Hourly rounding continues. Problem: Nutrition  Goal: Optimal nutrition therapy  Outcome: Ongoing  Patient appetite worsening and patient barely eating. Patient very lethargic during the day and did not eat. Will continue to monitor. Problem: Risk for Impaired Skin Integrity  Goal: Tissue integrity - skin and mucous membranes  Structural intactness and normal physiological function of skin and  mucous membranes. Outcome: Ongoing  Patient has generalized bruising located on body. Patient being turned every two hours. Will continue to monitor. Problem: Discharge Planning:  Goal: Discharged to appropriate level of care  Discharged to appropriate level of care   Outcome: Ongoing  Patient plans to be discharged to TCU or an ECF. Appropriate for her level of care. Will continue to monitor. Problem: Anxiety/Stress:  Goal: Level of anxiety will decrease  Level of anxiety will decrease   Outcome: Ongoing  Patient less anxious than normal. Will continue to monitor. Problem: Aspiration:  Goal: Absence of aspiration  Absence of aspiration   Outcome: Ongoing  Patient passed swallow evaluation. No signs of aspiration present. Problem: Bowel Function - Altered:  Goal: Bowel elimination is within specified parameters  Bowel elimination is within specified parameters   Outcome: Ongoing  Patient only having smears. Patient incontinent of stool. Receiving lactulose. Will continue to monitor bowel function. Problem: Cardiac Output - Decreased:  Goal: Hemodynamic stability will improve  Hemodynamic stability will improve   Outcome: Ongoing  Patient's vitals stable. Patient remains normal sinus with heart rate in the 40-60s. Patient has normal saline running at 100ml/hr.

## 2017-09-22 NOTE — PROGRESS NOTES
Renal Progress Note    Assessment and Plan: 1. Acute kidney injury with oliguria and  serum creatinine worsening this am  2. Hypertension controlled   3. Deconditioning   4. Liver cirrhosis   5. Vit D deficiency  PLAN:  Review labs  Repeat urine eosinophils  negative   Serum creatinine is increased today  Reviewed kidney ultrasound images   No hydronephrosis   Official report pending   Fluid bolus with 500 ml 0.9 saline once   Increase 0.9 saline to 125 ml/hr   AM labs   Discussed with staff      Patient Active Problem List:     GIST (gastrointestinal stromal tumor), non-malignant     Essential hypertension     Cirrhosis of liver with ascites ([facility]     Cancer (HCC)     Hypothyroidism     Dizzy spells     Fever     Epigastric pain     History of atrial fibrillation     Primary biliary cirrhosis (HCC)     Hypertensive urgency     Urethrovaginal fistula     Pleural effusion     Acute hepatic encephalopathy     Thrombocytopenia (HCC)     Microcytic anemia     Hyperbilirubinemia     Hypokalemia     Urinary retention     Hypercalcemia, not POA     Acute renal failure, not POA     Pneumonia, organism unspecified     BEN (acute kidney injury) ([facility] [facility]     Dehydration     Acquired hypothyroidism     Hypovitaminosis D     Hepatic encephalopathy (HCC)     Hypothermia, not POA     Sludge in gallbladder     Encephalopathy      Subjective:   Admit Date: 9/15/2017    Interval History:   Seeing for acute kidney injury   Very finally sleeping   Updated by the staff  Blood pressure is good   Poor urine output   Sleeping better now and more calm   Code status changed to DNR CC-A per family       Medications:   Scheduled Meds:   sodium chloride  500 mL Intravenous Once    hydrALAZINE  25 mg Oral 3 times per day    vitamin D  5,000 Units Oral Daily    rifaximin  550 mg Oral BID    levothyroxine  112 mcg Oral Daily    ferrous sulfate  325 mg Oral BID WC    ursodiol  300 mg Oral BID    piperacillin-tazobactam  3.375 g Intravenous Q8H  cinacalcet  30 mg Oral Daily    famotidine  20 mg Oral Daily    amLODIPine  10 mg Oral Daily    nicotine  1 patch Transdermal Daily    phosphorus replacement protocol   Other RX Placeholder    lactulose  20 g Oral TID    sodium chloride flush  10 mL Intravenous 2 times per day     Continuous Infusions:   sodium chloride 100 mL/hr at 09/22/17 0330       CBC:   Recent Labs      09/20/17   0616  09/21/17   0452  09/22/17   0420   WBC  7.7  8.6  10.4   HGB  10.2*  10.1*  10.7*   PLT  72*  80*  98*     CMP:  Recent Labs      09/20/17   0616  09/21/17   0452  09/22/17   0420   NA  141  138  142   K  4.1  4.6  4.4   CL  106  106  107   CO2  19*  17*  16*   BUN  53*  61*  66*   CREATININE  2.0*  2.1*  2.5*   GLUCOSE  121*  122*  120*   CALCIUM  10.5  10.0  10.2   LABGLOM  24*  23*  19*     Troponin: No results for input(s): TROPONINI in the last 72 hours. BNP: No results for input(s): BNP in the last 72 hours. INR: No results for input(s): INR in the last 72 hours. Lipids: No results for input(s): CHOL, LDLDIRECT, TRIG, HDL, AMYLASE, LIPASE in the last 72 hours. Liver: Recent Labs      09/22/17   0420   AST  27   ALT  18   ALKPHOS  171*   PROT  5.4*   LABALBU  2.2*   BILITOT  1.8*     Iron:  No results for input(s): IRONS, FERRITIN in the last 72 hours. Invalid input(s): LABIRONS    Objective:   Vitals: /69  Pulse 60  Temp 96.3 °F (35.7 °C) (Rectal)   Resp 18  Ht 5' 1\" (1.549 m)  Wt 152 lb 9.6 oz (69.2 kg)  SpO2 95%  BMI 28.83 kg/m2   Wt Readings from Last 3 Encounters:   09/22/17 152 lb 9.6 oz (69.2 kg)   02/16/17 131 lb (59.4 kg)   12/10/15 122 lb (55.3 kg)      24HR INTAKE/OUTPUT:    Intake/Output Summary (Last 24 hours) at 09/22/17 1994  Last data filed at 09/22/17 0421   Gross per 24 hour   Intake          2720.56 ml   Output              290 ml   Net          2430.56 ml       Constitutional:  Very sleepy  Skin:normal   HEENT:Pupils are reactive . Throat is clear   Neck:supple with no

## 2017-09-22 NOTE — FLOWSHEET NOTE
While connecting with the patient, I offered emotional support and words of encouragement. There were no family members present at the time.  Plan: Continued support would be helpful.      09/22/17 8890   Encounter Summary   Services provided to: Patient   Referral/Consult From: [facility] Family members   Continue Visiting Yes  (9/22/17 Palliative Care pt. )   Complexity of Encounter Moderate   Length of Encounter 15 minutes   Routine   Type Follow up   Assessment Coping   Intervention Nurtured hope   Outcome Coping   Spiritual/Yarsanism   Type Spiritual support

## 2017-09-22 NOTE — PROGRESS NOTES
2200: Patient temperature 97.8 rectally. 2300: Patient temperature 98.3 rectally. Annetta adame removed. Will continue to monitor.

## 2017-09-22 NOTE — PROGRESS NOTES
[facility]  OCCUPATIONAL THERAPY MISSED TREATMENT NOTE  ACUTE CARE    Date: 2017  Patient Name: Jami Carbone        CSN: [id number]   : 1940  (68 y.o.)  Gender: female   Referring Practitioner: Dr. Dale Ferguson MD  Diagnosis: Hypertensive Urgency         REASON FOR MISSED TREATMENT:  Missed Treat.

## 2017-09-22 NOTE — PROGRESS NOTES
lactate, acetaminophen, magnesium hydroxide, ondansetron, potassium chloride, magnesium sulfate, labetalol, sodium chloride flush      LABS:     CBC:   Recent Labs      09/20/17   0616  09/21/17   0452  09/22/17   0420   WBC  7.7  8.6  10.4   HGB  10.2*  10.1*  10.7*   PLT  72*  80*  98*     BMP:  Recent Labs      09/20/17   0616  09/21/17   0452  09/22/17   0420   NA  141  138  142   K  4.1  4.6  4.4   CL  106  106  107   CO2  19*  17*  16*   BUN  53*  61*  66*   CREATININE  2.0*  2.1*  2.5*   GLUCOSE  121*  122*  120*     Calcium:  Recent Labs      09/22/17   0420   CALCIUM  10.2     Ionized Calcium:No results for input(s): IONCA in the last 72 hours. Magnesium:  Recent Labs      09/22/17   0420   MG  2.1     Phosphorus:  Recent Labs      09/22/17   0420   PHOS  4.9*     BNP:No results for input(s): BNP in the last 72 hours. Glucose:No results for input(s): POCGLU in the last 72 hours. HgbA1C: No results for input(s): LABA1C in the last 72 hours. INR: No results for input(s): INR in the last 72 hours. Hepatic: Recent Labs      09/20/17   0616  09/21/17 0452 09/22/17   0420   ALKPHOS  180*  158*  171*   ALT  19  20  18   AST  36  44*  27   PROT  5.7*  5.4*  5.4*   BILITOT  2.2*  1.9*  1.8*   LABALBU  2.4*  2.1*  2.2*     Amylase and Lipase:No results for input(s): LACTA, AMYLASE in the last 72 hours. Lactic Acid: No results for input(s): LACTA in the last 72 hours. Troponin: No results for input(s): CKTOTAL, CKMB, TROPONINI in the last 72 hours. BNP: No results for input(s): BNP in the last 72 hours.     CULTURES:   UA: Recent Labs      09/21/17   1300   PHUR  5.0   COLORU  DK YELLOW*   PROTEINU  100*   BLOODU  NEGATIVE   RBCUA  15-20   WBCUA  10-15   BACTERIA  NONE   NITRU  NEGATIVE   GLUCOSEU  NEGATIVE   BILIRUBINUR  NEGATIVE   UROBILINOGEN  0.2   KETUA  NEGATIVE     Micro:   Lab Results   Component Value Date    BC No growth-preliminary 09/21/2017         IMAGING:         Problem list of patient: Patient Active Problem List   Diagnosis Code    GIST (gastrointestinal stromal tumor), non-malignant D21.4    Essential hypertension I10    Cirrhosis of liver with ascites ([facility] [address] K74.60    [facility] C80.1    Hypothyroidism E03.9    Dizzy spells R42    Fever R50.9    Epigastric pain R10.13    History of atrial fibrillation Z86.79    Primary biliary cirrhosis ([facility] K74.3    Hypertensive urgency I16.0    Urethrovaginal fistula N82.1    Pleural effusion J90    Acute hepatic encephalopathy K72.00    Thrombocytopenia (HCC) D69.6    Microcytic anemia D50.9    Hyperbilirubinemia E80.6    Hypokalemia E87.6    Urinary retention R33.9    Hypercalcemia, not POA E83.52    Acute renal failure, not POA N17.9    Pneumonia, organism unspecified J18.9    BEN (acute kidney injury) ([facility] N17.9    Dehydration E86.0    Acquired hypothyroidism E03.9    Hypovitaminosis D E55.9    Hepatic encephalopathy (HCC) K72.90    Hypothermia, not POA T68. XXXA    Sludge in gallbladder K82.8    Encephalopathy G93.40         ASSESSMENT/PLAN   Biliary Cirrhosis   Pneumonia suspected aspiration  Hypothermia: continue current supportive care  Discussed with son. Code status changed to Limited.       Marcella Woo MD, [address] 9/22/2017 3:57 PM

## 2017-09-22 NOTE — PLAN OF CARE
Problem: Falls - Risk of  Goal: Absence of falls  Outcome: Ongoing  Hourly rounding continues. Bed alarm and tele sitter. Problem: Nutrition  Goal: Optimal nutrition therapy  Outcome: Ongoing  Patient not eating today, sleepy today. Will continue to monitor    Problem: Risk for Impaired Skin Integrity  Goal: Tissue integrity - skin and mucous membranes  Structural intactness and normal physiological function of skin and  mucous membranes. Outcome: Ongoing  Skin intact. Will continue to turn patient every 2    Problem: Discharge Planning:  Goal: Participates in care planning  Participates in care planning   Outcome: Not Met This Shift  Family active in care plan, plan for nursing home  Goal: Discharged to appropriate level of care  Discharged to appropriate level of care   Outcome: Ongoing  Plan for nursing home at discharge. Social work on case. Problem: Airway Clearance - Ineffective:  Goal: Ability to maintain a clear airway will improve  Ability to maintain a clear airway will improve   Outcome: Ongoing  Patient doesn't have strong cough, encouraged to cough however doesn't follow many commands today. Problem: Anxiety/Stress:  Goal: Level of anxiety will decrease  Level of anxiety will decrease   Outcome: Ongoing  Patient off and on agitated, no medications today    Problem: Aspiration:  Goal: Absence of aspiration  Absence of aspiration   Outcome: Ongoing  Patient took small sips today of ensure, no signs of aspiration    Problem: Bowel Function - Altered:  Goal: Bowel elimination is within specified parameters  Bowel elimination is within specified parameters   Outcome: Ongoing  Incontinent of stool today.     Problem: Cardiac Output - Decreased:  Goal: Hemodynamic stability will improve  Hemodynamic stability will improve   Outcome: Ongoing  Temp remains low today, will continue to assess for need of blanche hugger        Problem: Mental Status - Impaired:  Goal: Mental status will be restored to baseline  Mental status will be restored to baseline   Outcome: Ongoing  Patient not alert, remains confused, will continue to assess. Problem: Nutrition Deficit:  Goal: Ability to achieve adequate nutritional intake will improve  Ability to achieve adequate nutritional intake will improve   Outcome: Ongoing  Not eating much, ensures with meals. Problem: Emotional Distress  Goal: Verbalization of thoughts and feelings  Outcome: Completed Date Met:  09/22/17    Problem: Musculor/Skeletal Functional Status  Goal: Highest potential functional level  Outcome: Ongoing  Patient sleepy today, will encourage activity    Comments:   Care plan reviewed with family. Family verbalize understanding of the plan of care and contribute to goal setting.

## 2017-09-22 NOTE — PROGRESS NOTES
[facility] [address] 60  PHYSICAL THERAPY MISSED TREATMENT NOTE  ACUTE CARE    Date: 2017  Patient Name: Steve Weaver        MRN: [id number]   : 1940  (68 y.o.)  Gender: female   Referring Practitioner: Dr. Lesli Davis MD  Referring Practitioner: Katty Jeffrey  Diagnosis: Hypertensive Urgency  Diagnosis: hypertensive urgency         REASON FOR MISSED TREATMENT:  Hold treatment per nursing request. Attempted patient this afternoon, spoke with RN prior and she said to hold PT treatment at this time due to patient being very fatigued. Will try back on next treatment day. Swathi Barriga \[facility]\"" [id number]

## 2017-09-22 NOTE — PROGRESS NOTES
likely parapneumonic. No significant ascites amenable to paracentesis on abdominal US to completely associate with hepatic hydrothorax. Needs repeat CXR in a few weeks. 6. Accelerated hypertension/hypertensive urgency. Was on labetalol infusion, which has been titrated off. BP stable currently - continue norvasc. 7. Acute renal failure, not POA. Likely secondary to pre-renal azotemia vs hypercalcemia vs interstitial nephritis. Nephrology assisting with care; completed course of IV solumedrol for possible interstitial nephritis. Now on saline. 8. Hypercalcemia, resolved. PTH elevated, vitamin D low; likely primary hyperparathyroidism. Endocrinology on board - planning sestamibi scan next week. 9. Hx of afib, not on anticoagulation per chronic thrombocytopenia and bleeding risk. 10. Urinary retention. Aguilar catheter had to be placed by urology; to follow up with urologist as outpatient. 11. Urethrovaginal fistula. 12. Hypothyroidism. Continue synthroid. Diet: DIET CARDIAC; Dietary Nutrition Supplements: Low Calorie High Protein Supplement    Code status: Limited     ----------  Subjective  Confused. Not endorsing any symptoms. Objective  Physical exam:  Vitals: BP (!) 116/53  Pulse 60  Temp 95.2 °F (35.1 °C) (Rectal)   Resp 18  Ht 5' 1\" (1.549 m)  Wt 152 lb 9.6 oz (69.2 kg)  SpO2 94%  BMI 28.83 kg/m2  Gen: Not in distress. Awake. Remains confused. Head: Normocephalic. Atraumatic. Eyes: EOMI. Good acuity. ENT: Oral mucosa dry  Neck: No JVD. No obvious thyromegaly. CVS: Nml S1S2, no MRG. Bradycardic  Pulmomary: Clear bilaterally. No crackles. No wheezes. Gastrointestinal: Ventral abdominal hernia. Soft, non-tender, nondistended  Musculoskeletal: Trace pedal edema. Warm  Neuro: No focal deficit. Moves extremity spontaneously. Psychiatry: Appropriate affect. Not agitated. Skin: Warm, dry with normal turgor.  No rash    24HR INTAKE/OUTPUT:      Intake/Output Summary (Last 24 hours) at 09/22/17 1254  Last data filed at 09/22/17 0421   Gross per 24 hour   Intake          2720.56 ml   Output              290 ml   Net          2430.56 ml     I/O last 3 completed shifts: In: 2720.6 [I.V.:2720.6]  Out: 290 [Urine:290]       Meds:    piperacillin-tazobactam  3.375 g Intravenous Q12H    hydrALAZINE  25 mg Oral 3 times per day    vitamin D  5,000 Units Oral Daily    rifaximin  550 mg Oral BID    levothyroxine  112 mcg Oral Daily    ferrous sulfate  325 mg Oral BID WC    ursodiol  300 mg Oral BID    cinacalcet  30 mg Oral Daily    famotidine  20 mg Oral Daily    amLODIPine  10 mg Oral Daily    nicotine  1 patch Transdermal Daily    phosphorus replacement protocol   Other RX Placeholder    lactulose  20 g Oral TID    sodium chloride flush  10 mL Intravenous 2 times per day       Infusions:    sodium chloride 125 mL/hr at 09/22/17 1158       PRN Meds: melatonin, haloperidol lactate, acetaminophen, magnesium hydroxide, ondansetron, potassium chloride, magnesium sulfate, labetalol, sodium chloride flush    Labs/imaging:  CBC:   Recent Labs      09/20/17   0616  09/21/17   0452  09/22/17   0420   WBC  7.7  8.6  10.4   HGB  10.2*  10.1*  10.7*   PLT  72*  80*  98*         BMP:    Recent Labs      09/20/17   0616  09/21/17   0452  09/22/17   0420   NA  141  138  142   K  4.1  4.6  4.4   CL  106  106  107   CO2  19*  17*  16*   BUN  53*  61*  66*   CREATININE  2.0*  2.1*  2.5*   GLUCOSE  121*  122*  120*         Hepatic:   Recent Labs      09/20/17   0616  09/21/17   0452  09/22/17   0420   AST  36  44*  27   ALT  19  20  18   BILITOT  2.2*  1.9*  1.8*   ALKPHOS  180*  158*  171*     INR:   No results for input(s): INR in the last 72 hours. Reviewed imaging and reports noted    Diet: DIET CARDIAC;   Dietary Nutrition Supplements: Low Calorie High Protein Supplement    Jon Ybarra MD  -------------------------------  [facility]

## 2017-09-22 NOTE — FLOWSHEET NOTE
Informed Dr. Kristie Carbone when he was on the unit of the patient's output of 125 from [id number]. No new orders at this time.

## 2017-09-23 LAB
ALBUMIN SERPL-MCNC: 2.1 G/DL (ref 3.5–5.1)
ALP BLD-CCNC: 168 U/L (ref 38–126)
ALT SERPL-CCNC: 17 U/L (ref 11–66)
ANION GAP SERPL CALCULATED.3IONS-SCNC: 18 MEQ/L (ref 8–16)
ANISOCYTOSIS: ABNORMAL
AST SERPL-CCNC: 27 U/L (ref 5–40)
BASOPHILS # BLD: 0 %
BASOPHILS ABSOLUTE: 0 THOU/MM3 (ref 0–0.1)
BILIRUB SERPL-MCNC: 1.7 MG/DL (ref 0.3–1.2)
BUN BLDV-MCNC: 67 MG/DL (ref 7–22)
CALCIUM SERPL-MCNC: 10.4 MG/DL (ref 8.5–10.5)
CHLORIDE BLD-SCNC: 111 MEQ/L (ref 98–111)
CO2: 13 MEQ/L (ref 23–33)
CREAT SERPL-MCNC: 2.5 MG/DL (ref 0.4–1.2)
EOSINOPHIL # BLD: 0.1 %
EOSINOPHILS ABSOLUTE: 0 THOU/MM3 (ref 0–0.4)
GFR SERPL CREATININE-BSD FRML MDRD: 19 ML/MIN/1.73M2
GLUCOSE BLD-MCNC: 127 MG/DL (ref 70–108)
HCT VFR BLD CALC: 29.9 % (ref 37–47)
HEMOGLOBIN: 9.9 GM/DL (ref 12–16)
HYPOCHROMIA: ABNORMAL
LYMPHOCYTES # BLD: 5.6 %
LYMPHOCYTES ABSOLUTE: 0.6 THOU/MM3 (ref 1–4.8)
MAGNESIUM: 2.3 MG/DL (ref 1.6–2.4)
MCH RBC QN AUTO: 25 PG (ref 27–31)
MCHC RBC AUTO-ENTMCNC: 33.1 GM/DL (ref 33–37)
MCV RBC AUTO: 75.6 FL (ref 81–99)
MICROCYTES: ABNORMAL
MONOCYTES # BLD: 5.2 %
MONOCYTES ABSOLUTE: 0.5 THOU/MM3 (ref 0.4–1.3)
NUCLEATED RED BLOOD CELLS: 0 /100 WBC
PDW BLD-RTO: 22.8 % (ref 11.5–14.5)
PHOSPHORUS: 5.1 MG/DL (ref 2.4–4.7)
PLATELET # BLD: 64 THOU/MM3 (ref 130–400)
PMV BLD AUTO: 8.8 MCM (ref 7.4–10.4)
POTASSIUM SERPL-SCNC: 4.6 MEQ/L (ref 3.5–5.2)
RBC # BLD: 3.96 MILL/MM3 (ref 4.2–5.4)
RBC # BLD: NORMAL 10*6/UL
SEG NEUTROPHILS: 89.1 %
SEGMENTED NEUTROPHILS ABSOLUTE COUNT: 9.1 THOU/MM3 (ref 1.8–7.7)
SODIUM BLD-SCNC: 142 MEQ/L (ref 135–145)
TOTAL PROTEIN: 5.3 G/DL (ref 6.1–8)
VITAMIN D 1,25-DIHYDROXY: 19.3 PG/ML (ref 19.9–79.3)
WBC # BLD: 10.2 THOU/MM3 (ref 4.8–10.8)

## 2017-09-23 PROCEDURE — 2500000003 HC RX 250 WO HCPCS: Performed by: INTERNAL MEDICINE

## 2017-09-23 PROCEDURE — 2580000003 HC RX 258: Performed by: INTERNAL MEDICINE

## 2017-09-23 PROCEDURE — 80053 COMPREHEN METABOLIC PANEL: CPT

## 2017-09-23 PROCEDURE — 6360000002 HC RX W HCPCS: Performed by: INTERNAL MEDICINE

## 2017-09-23 PROCEDURE — 99232 SBSQ HOSP IP/OBS MODERATE 35: CPT | Performed by: INTERNAL MEDICINE

## 2017-09-23 PROCEDURE — 84100 ASSAY OF PHOSPHORUS: CPT

## 2017-09-23 PROCEDURE — 1200000000 HC SEMI PRIVATE

## 2017-09-23 PROCEDURE — 2580000003 HC RX 258: Performed by: NURSE PRACTITIONER

## 2017-09-23 PROCEDURE — 36415 COLL VENOUS BLD VENIPUNCTURE: CPT

## 2017-09-23 PROCEDURE — 85025 COMPLETE CBC W/AUTO DIFF WBC: CPT

## 2017-09-23 PROCEDURE — 83735 ASSAY OF MAGNESIUM: CPT

## 2017-09-23 RX ORDER — BUMETANIDE 0.25 MG/ML
1 INJECTION, SOLUTION INTRAMUSCULAR; INTRAVENOUS EVERY 12 HOURS
Status: DISCONTINUED | OUTPATIENT
Start: 2017-09-23 | End: 2017-09-24

## 2017-09-23 RX ADMIN — Medication 10 ML: at 22:17

## 2017-09-23 RX ADMIN — PIPERACILLIN SODIUM,TAZOBACTAM SODIUM 3.38 G: 3; .375 INJECTION, POWDER, FOR SOLUTION INTRAVENOUS at 18:44

## 2017-09-23 RX ADMIN — SODIUM CHLORIDE: 9 INJECTION, SOLUTION INTRAVENOUS at 04:43

## 2017-09-23 RX ADMIN — Medication 10 ML: at 10:44

## 2017-09-23 RX ADMIN — PIPERACILLIN SODIUM,TAZOBACTAM SODIUM 3.38 G: 3; .375 INJECTION, POWDER, FOR SOLUTION INTRAVENOUS at 04:41

## 2017-09-23 RX ADMIN — BUMETANIDE 1 MG: 0.25 INJECTION, SOLUTION INTRAMUSCULAR; INTRAVENOUS at 10:40

## 2017-09-23 RX ADMIN — BUMETANIDE 1 MG: 0.25 INJECTION, SOLUTION INTRAMUSCULAR; INTRAVENOUS at 22:17

## 2017-09-23 ASSESSMENT — PAIN SCALES - GENERAL
PAINLEVEL_OUTOF10: 0
PAINLEVEL_OUTOF10: 0

## 2017-09-23 ASSESSMENT — PAIN SCALES - WONG BAKER
WONGBAKER_NUMERICALRESPONSE: 0

## 2017-09-23 NOTE — PROGRESS NOTES
Pt is resting quietly on left side. AM assessment completed. Pt is unaware of date month, time, situation. Reassured and reoriented without success. Repositioned pt to supine position and elevated HOB. Attempted to give pt a teaspoon of water. Pt left water lay in mouth difficulty with swallowing. Nurse during oral care noted pt gag was weak. No further fluids or food given to this pt at this time. Pt is unable to hold head up above pillow, weak and worn out. Cleansed face with warm wash cloth and pt opened eyes for a minute. PT verbaized she wants to go to the other building. This nurse asked pt what was int he other building and she said, It is time to see Joselin Montes am ready. Nurse reassured pt it was ok. Pt cloased eyes. Resting. Pt was repositioned to right side for comfort. Warm blankets for comfort.

## 2017-09-23 NOTE — PROGRESS NOTES
Renal Progress Note    Assessment and Plan: 1. Acute kidney injury mostly stable   2. Large ventral hernia   3. Deconditioning   4. Lower extremity edema   5. Liver cirrhosis   6. Anemia   7. Thrombocytopenia   PLAN:  Reviewed labs   Vit D 1,25 low   [place]/lambda free light chain assay ok  Reviewed medications   Stop IV fluid   Bumex 1 mg IV every 12 hrs for 4 doses   Stop haloperidol  AM labs   Discussed with staff  Will follow     Patient Active Problem List:     GIST (gastrointestinal stromal tumor), non-malignant     Essential hypertension     Cirrhosis of liver with ascites (HCC)     Cancer (HCC)     Hypothyroidism     Dizzy spells     Fever     Epigastric pain     History of atrial fibrillation     Primary biliary cirrhosis (HCC)     Hypertensive urgency     Urethrovaginal fistula     Pleural effusion     Acute hepatic encephalopathy     Thrombocytopenia (HCC)     Microcytic anemia     Hyperbilirubinemia     Hypokalemia     Urinary retention     Hypercalcemia, not POA     Acute renal failure, not POA     Pneumonia, organism unspecified     BEN (acute kidney injury) ([facility]     Dehydration     Acquired hypothyroidism     Hypovitaminosis D     Hepatic encephalopathy (HCC)     Hypothermia, not POA     Sludge in gallbladder     Encephalopathy      Subjective:   Admit Date: 9/15/2017    Interval History:  Seeing for acute injury   Very sleepy   Still poor urine output   Updated by the staff  Has lower extremity edema  now       Medications:   Scheduled Meds:   bumetanide  1 mg Intravenous Q12H    piperacillin-tazobactam  3.375 g Intravenous Q12H    vitamin D  5,000 Units Oral Daily    rifaximin  550 mg Oral BID    levothyroxine  112 mcg Oral Daily    ferrous sulfate  325 mg Oral BID WC    ursodiol  300 mg Oral BID    cinacalcet  30 mg Oral Daily    famotidine  20 mg Oral Daily    amLODIPine  10 mg Oral Daily    nicotine  1 patch Transdermal Daily    phosphorus replacement protocol   Other RX Placeholder   

## 2017-09-23 NOTE — PROGRESS NOTES
Problem list of patient:     Patient Active Problem List   Diagnosis Code    GIST (gastrointestinal stromal tumor), non-malignant D21.4    Essential hypertension I10    Cirrhosis of liver with ascites ([facility] K74.60    Cancer ([facility] C80.1    Hypothyroidism E03.9    Dizzy spells R42    Fever R50.9    Epigastric pain R10.13    History of atrial fibrillation Z86.79    Primary biliary cirrhosis ([facility] K74.3    Hypertensive urgency I16.0    Urethrovaginal fistula N82.1    Pleural effusion J90    Acute hepatic encephalopathy K72.00    Thrombocytopenia (HCC) D69.6    Microcytic anemia D50.9    Hyperbilirubinemia E80.6    Hypokalemia E87.6    Urinary retention R33.9    Hypercalcemia, not POA E83.52    Acute renal failure, not POA N17.9    Pneumonia, organism unspecified J18.9    BEN (acute kidney injury) ([facility] N17.9    Dehydration E86.0    Acquired hypothyroidism E03.9    Hypovitaminosis D E55.9    Hepatic encephalopathy (HCC) K72.90    Hypothermia, not POA T68. XXXA    Sludge in gallbladder K82.8    Encephalopathy G93.40         ASSESSMENT/PLAN   -Biliary Cirrhosis with encephalopathy  Pneumonia suspected aspiration  Hypothermia:  BEN  Continue current supportive treatment.   Unknown MD Silvia, [address] 9/23/2017 12:04 PM

## 2017-09-23 NOTE — PROGRESS NOTES
Report called to [employer] on 5K. Transport request for transport staff to take patient in bed to [place] [id number].

## 2017-09-24 ENCOUNTER — APPOINTMENT (OUTPATIENT)
Dept: CT IMAGING | Age: 77
DRG: 441 | End: 2017-09-24
Attending: ANESTHESIOLOGY
Payer: MEDICARE

## 2017-09-24 PROBLEM — E21.3 HYPERPARATHYROIDISM (HCC): Status: ACTIVE | Noted: 2017-09-24

## 2017-09-24 LAB
ALBUMIN SERPL-MCNC: 2 G/DL (ref 3.5–5.1)
ALP BLD-CCNC: 173 U/L (ref 38–126)
ALT SERPL-CCNC: 15 U/L (ref 11–66)
AMMONIA: 20 UMOL/L (ref 11–60)
ANION GAP SERPL CALCULATED.3IONS-SCNC: 20 MEQ/L (ref 8–16)
ANISOCYTOSIS: ABNORMAL
AST SERPL-CCNC: 21 U/L (ref 5–40)
BASOPHILS # BLD: 0 %
BASOPHILS ABSOLUTE: 0 THOU/MM3 (ref 0–0.1)
BILIRUB SERPL-MCNC: 1.5 MG/DL (ref 0.3–1.2)
BUN BLDV-MCNC: 77 MG/DL (ref 7–22)
CALCIUM SERPL-MCNC: 10.2 MG/DL (ref 8.5–10.5)
CHLORIDE BLD-SCNC: 110 MEQ/L (ref 98–111)
CO2: 16 MEQ/L (ref 23–33)
CREAT SERPL-MCNC: 3 MG/DL (ref 0.4–1.2)
CRENATED RBC'S: ABNORMAL
EOSINOPHIL # BLD: 0.5 %
EOSINOPHILS ABSOLUTE: 0.1 THOU/MM3 (ref 0–0.4)
GFR SERPL CREATININE-BSD FRML MDRD: 15 ML/MIN/1.73M2
GLUCOSE BLD-MCNC: 94 MG/DL (ref 70–108)
HCT VFR BLD CALC: 30 % (ref 37–47)
HEMOGLOBIN: 9.8 GM/DL (ref 12–16)
HYPOCHROMIA: ABNORMAL
LYMPHOCYTES # BLD: 13.5 %
LYMPHOCYTES ABSOLUTE: 1.4 THOU/MM3 (ref 1–4.8)
MAGNESIUM: 2 MG/DL (ref 1.6–2.4)
MCH RBC QN AUTO: 25 PG (ref 27–31)
MCHC RBC AUTO-ENTMCNC: 32.5 GM/DL (ref 33–37)
MCV RBC AUTO: 76.7 FL (ref 81–99)
MICROCYTES: ABNORMAL
MONOCYTES # BLD: 9.8 %
MONOCYTES ABSOLUTE: 1 THOU/MM3 (ref 0.4–1.3)
NUCLEATED RED BLOOD CELLS: 2 /100 WBC
PDW BLD-RTO: 22.8 % (ref 11.5–14.5)
PHOSPHORUS: 5 MG/DL (ref 2.4–4.7)
PLATELET # BLD: 82 THOU/MM3 (ref 130–400)
PLATELET ESTIMATE: ABNORMAL
PMV BLD AUTO: 9 MCM (ref 7.4–10.4)
POIKILOCYTES: ABNORMAL
POTASSIUM SERPL-SCNC: 3.7 MEQ/L (ref 3.5–5.2)
PROCALCITONIN: 0.58 NG/ML (ref 0.01–0.09)
RBC # BLD: 3.92 MILL/MM3 (ref 4.2–5.4)
RBC # BLD: ABNORMAL 10*6/UL
SCAN OF BLOOD SMEAR: NORMAL
SEG NEUTROPHILS: 76.2 %
SEGMENTED NEUTROPHILS ABSOLUTE COUNT: 8 THOU/MM3 (ref 1.8–7.7)
SODIUM BLD-SCNC: 146 MEQ/L (ref 135–145)
TOTAL PROTEIN: 5 G/DL (ref 6.1–8)
WBC # BLD: 10.5 THOU/MM3 (ref 4.8–10.8)

## 2017-09-24 PROCEDURE — 99232 SBSQ HOSP IP/OBS MODERATE 35: CPT | Performed by: INTERNAL MEDICINE

## 2017-09-24 PROCEDURE — 6360000002 HC RX W HCPCS: Performed by: INTERNAL MEDICINE

## 2017-09-24 PROCEDURE — 2580000003 HC RX 258: Performed by: INTERNAL MEDICINE

## 2017-09-24 PROCEDURE — 84100 ASSAY OF PHOSPHORUS: CPT

## 2017-09-24 PROCEDURE — 80053 COMPREHEN METABOLIC PANEL: CPT

## 2017-09-24 PROCEDURE — 82140 ASSAY OF AMMONIA: CPT

## 2017-09-24 PROCEDURE — 85025 COMPLETE CBC W/AUTO DIFF WBC: CPT

## 2017-09-24 PROCEDURE — 70450 CT HEAD/BRAIN W/O DYE: CPT

## 2017-09-24 PROCEDURE — 2500000003 HC RX 250 WO HCPCS: Performed by: INTERNAL MEDICINE

## 2017-09-24 PROCEDURE — 83735 ASSAY OF MAGNESIUM: CPT

## 2017-09-24 PROCEDURE — 99233 SBSQ HOSP IP/OBS HIGH 50: CPT | Performed by: INTERNAL MEDICINE

## 2017-09-24 PROCEDURE — 1200000000 HC SEMI PRIVATE

## 2017-09-24 PROCEDURE — 36415 COLL VENOUS BLD VENIPUNCTURE: CPT

## 2017-09-24 PROCEDURE — 84145 PROCALCITONIN (PCT): CPT

## 2017-09-24 PROCEDURE — 1200000003 HC TELEMETRY R&B

## 2017-09-24 RX ORDER — HEPARIN SODIUM 5000 [USP'U]/ML
5000 INJECTION, SOLUTION INTRAVENOUS; SUBCUTANEOUS EVERY 8 HOURS SCHEDULED
Status: DISCONTINUED | OUTPATIENT
Start: 2017-09-24 | End: 2017-10-02

## 2017-09-24 RX ADMIN — PIPERACILLIN SODIUM,TAZOBACTAM SODIUM 3.38 G: 3; .375 INJECTION, POWDER, FOR SOLUTION INTRAVENOUS at 06:05

## 2017-09-24 RX ADMIN — SODIUM BICARBONATE: 84 INJECTION, SOLUTION INTRAVENOUS at 11:12

## 2017-09-24 RX ADMIN — HEPARIN SODIUM 5000 UNITS: 5000 INJECTION, SOLUTION INTRAVENOUS; SUBCUTANEOUS at 16:54

## 2017-09-24 RX ADMIN — PIPERACILLIN SODIUM,TAZOBACTAM SODIUM 3.38 G: 3; .375 INJECTION, POWDER, FOR SOLUTION INTRAVENOUS at 17:04

## 2017-09-24 NOTE — PLAN OF CARE
Problem: Falls - Risk of  Goal: Absence of falls  Outcome: Ongoing  No falls noted this shift. Patient has a tele sitter in the room. Hourly rounding, bed in low position, call light and personal items in reach. Fall assessment complete. Problem: Nutrition  Goal: Optimal nutrition therapy  Outcome: Ongoing  Patient failed swallow test. Not giving patient anything by mouth. Patient appetite poor. Problem: Risk for Impaired Skin Integrity  Goal: Tissue integrity - skin and mucous membranes  Structural intactness and normal physiological function of skin and  mucous membranes. Outcome: Ongoing  Turning the patient every two hours with pillow assist. No new skin issues noted this shift. Problem: Airway Clearance - Ineffective:  Goal: Ability to maintain a clear airway will improve  Ability to maintain a clear airway will improve   Outcome: Ongoing  Patient coughed up dark yellow phlegm. Oral care given. Problem: Anxiety/Stress:  Goal: Level of anxiety will decrease  Level of anxiety will decrease   Outcome: Ongoing  Patient is anxious from time to time. Turning a light on and talking in from of her helps alleviate some anxiety. Problem: Aspiration:  Goal: Absence of aspiration  Absence of aspiration   Outcome: Ongoing  Not giving anything by mouth. Oral care given to patient. Problem: Bowel Function - Altered:  Goal: Bowel elimination is within specified parameters  Bowel elimination is within specified parameters   Patient has an ABD hernia. Patient ABD is soft, nontender, nondistended, no gauding. No BM this shift. Problem: Mental Status - Impaired:  Goal: Mental status will be restored to baseline  Mental status will be restored to baseline   Outcome: Ongoing  Patient did verbalize that she was comfortable and what she wanted. Was oriented to person, disoriented to place, time, and situation. Comments:   Care plan reviewed with patient.   Patient verbalize understanding of the plan of care and contribute to goal setting.

## 2017-09-24 NOTE — PROGRESS NOTES
Daily    famotidine  20 mg Oral Daily    amLODIPine  10 mg Oral Daily    nicotine  1 patch Transdermal Daily    phosphorus replacement protocol   Other RX Placeholder    lactulose  20 g Oral TID    sodium chloride flush  10 mL Intravenous 2 times per day     Continuous Infusions:   sodium bicarbonate infusion         CBC:   Recent Labs      09/22/17   0420  09/23/17   0534  09/24/17   0650   WBC  10.4  10.2  10.5   HGB  10.7*  9.9*  9.8*   PLT  98*  64*  82*     CMP:  Recent Labs      09/22/17   0420  09/23/17   0534  09/24/17   0555   NA  142  142  146*   K  4.4  4.6  3.7   CL  107  111  110   CO2  16*  13*  16*   BUN  66*  67*  77*   CREATININE  2.5*  2.5*  3.0*   GLUCOSE  120*  127*  94   CALCIUM  10.2  10.4  10.2   LABGLOM  19*  19*  15*     Troponin: No results for input(s): TROPONINI in the last 72 hours. BNP: No results for input(s): BNP in the last 72 hours. INR: No results for input(s): INR in the last 72 hours. Lipids: No results for input(s): CHOL, LDLDIRECT, TRIG, HDL, AMYLASE, LIPASE in the last 72 hours. Liver: Recent Labs      09/24/17   0555   AST  21   ALT  15   ALKPHOS  173*   PROT  5.0*   LABALBU  2.0*   BILITOT  1.5*     Iron:  No results for input(s): IRONS, FERRITIN in the last 72 hours. Invalid input(s): LABIRONS    Objective:   Vitals: /60  Pulse 74  Temp 97.8 °F (36.6 °C) (Axillary)   Resp 18  Ht 5' 1\" (1.549 m)  Wt 164 lb 9.3 oz (74.7 kg)  SpO2 96%  BMI 31.1 kg/m2   Wt Readings from Last 3 Encounters:   09/23/17 164 lb 9.3 oz (74.7 kg)   02/16/17 131 lb (59.4 kg)   12/10/15 122 lb (55.3 kg)      24HR INTAKE/OUTPUT:    Intake/Output Summary (Last 24 hours) at 09/24/17 1038  Last data filed at 09/24/17 0433   Gross per 24 hour   Intake              103 ml   Output             1700 ml   Net            -1597 ml       Constitutional:  lethagic  Skin:normal   HEENT:Pupils are reactive . Throat is clear   Neck:supple with no bruit  Cardiovascular:  S1, S2 without m/r/g

## 2017-09-24 NOTE — PROGRESS NOTES
Hospitalist Progress Note    Patient:  Niko Israel      Unit/Bed:5K-04/004-A    YOB: 1940    MRN: [id number]       Acct: [de-identified]     PCP: Jesica Tolbert    Date of Admission: 9/15/2017    Chief Complaint: adm for altered mental status, thought to be due hepatic encephalopathy; her ammonia now nl and she still ain't right. Hospital Course:  Adm for above. Also mention of urethrovaginal fistula. Has developed gradual getachew. Had hypercalcemia apparently due to hyperpara. Apparently was sharp mentally prior to present illness. Being treated for pneumonia, I am not sure of this dx    Subjective: not obtainable       Medications:  Reviewed    Infusion Medications    Scheduled Medications    bumetanide  1 mg Intravenous Q12H    piperacillin-tazobactam  3.375 g Intravenous Q12H    vitamin D  5,000 Units Oral Daily    rifaximin  550 mg Oral BID    levothyroxine  112 mcg Oral Daily    ferrous sulfate  325 mg Oral BID WC    ursodiol  300 mg Oral BID    cinacalcet  30 mg Oral Daily    famotidine  20 mg Oral Daily    amLODIPine  10 mg Oral Daily    nicotine  1 patch Transdermal Daily    phosphorus replacement protocol   Other RX Placeholder    lactulose  20 g Oral TID    sodium chloride flush  10 mL Intravenous 2 times per day     PRN Meds: melatonin, haloperidol lactate, acetaminophen, magnesium hydroxide, ondansetron, potassium chloride, magnesium sulfate, labetalol, sodium chloride flush      Intake/Output Summary (Last 24 hours) at 09/24/17 0842  Last data filed at 09/24/17 0433   Gross per 24 hour   Intake              103 ml   Output             1700 ml   Net            -1597 ml       Diet:  DIET CARDIAC;   Dietary Nutrition Supplements: Low Calorie High Protein Supplement    Exam:  /60  Pulse 74  Temp 97.8 °F (36.6 °C) (Axillary)   Resp 18  Ht 5' 1\" (1.549 m)  Wt 164 lb 9.3 oz (74.7 kg)  SpO2 96%  BMI 31.1 kg/m2    General appearance:Chronically ill appearing  Keeps eyes closed most of visit; mumbles  HEENT: Pupils equal, round, and reactive to light. Conjunctivae/corneas clear. Neck: Supple, with full range of motion. No jugular venous distention. Trachea midline. Respiratory:   Diminished br sds  Cardiovascular: Regular rate and rhythm with normal S1/S2 without murmurs, rubs or gallops. Abdomen: Soft, non-tender, non-distended with normal bowel sounds. Ventral hernia  Musculoskeletal: No clubbing, cyanosis or edema bilaterally. Full range of motion without deformity. Skin: pale, appears dry  Neurologic:  Drowsy, moves all 4s, no asterixis; no facial assymetry. Little speech. Eyes move in all directions  Psychiatric: above        Labs:   Recent Labs      09/22/17   0420  09/23/17   0534  09/24/17   0650   WBC  10.4  10.2  10.5   HGB  10.7*  9.9*  9.8*   HCT  32.6*  29.9*  30.0*   PLT  98*  64*  82*     Recent Labs      09/22/17 0420 09/23/17   0534  09/24/17   0555   NA  142  142  146*   K  4.4  4.6  3.7   CL  107  111  110   CO2  16*  13*  16*   BUN  66*  67*  77*   CREATININE  2.5*  2.5*  3.0*   CALCIUM  10.2  10.4  10.2   PHOS  4.9*  5.1*  5.0*     Recent Labs      09/22/17   0420 09/23/17   0534  09/24/17   0555   AST  27  27  21   ALT  18  17  15   BILITOT  1.8*  1.7*  1.5*   ALKPHOS  171*  168*  173*     No results for input(s): INR in the last 72 hours. No results for input(s): Bernis Rick in the last 72 hours. Urinalysis:    Lab Results   Component Value Date    NITRU NEGATIVE 09/21/2017    WBCUA 10-15 09/21/2017    BACTERIA NONE 09/21/2017    RBCUA 15-20 09/21/2017    BLOODU NEGATIVE 09/21/2017    SPECGRAV 1.016 09/15/2017    GLUCOSEU NEGATIVE 09/21/2017       Radiology:  US RENAL LIMITED   Final Result   1. Echogenic right renal cortex, possibly secondary to medical renal disease, but otherwise unremarkable renal ultrasound. 2. Aguilar catheter in a nondistended urinary bladder.       Final report electronically signed by  electronically signed by Dr. Sharita Lemus on 9/15/2017 7:23 PM      US GALLBLADDER RUQ   Final Result   1. Cirrhotic liver with possible recannulated emboli: Vein. 2. Marked periportal pericaval adenopathy. Follow-up with CT scan is recommended. 3. Gallbladder sludge with gallbladder wall thickening and pericholecystic edema. Acalculous cholecystitis is a possibility. **This report has been created using voice recognition software. It may contain minor errors which are inherent in voice recognition technology. **      Final report electronically signed by Dr. Sharita Lemus on 9/15/2017 7:23 PM      XR Chest Portable   Final Result   NG tube is well into the stomach            **This report has been created using voice recognition software. It may contain minor errors which are inherent in voice recognition technology. **      Final report electronically signed by Dr. Sharita Lemus on 9/15/2017 7:10 PM      XR Chest Portable   Final Result   NG tube in the distal esophagus            **This report has been created using voice recognition software. It may contain minor errors which are inherent in voice recognition technology. **      Final report electronically signed by Dr. Sharita Lemus on 9/15/2017 6:10 PM      US ABDOMINAL LIMITED   Final Result      Trace amount of fluid within the abdomen. Insufficient quantity of ascites to safely perform an ultrasound-guided paracentesis. **This report has been created using voice recognition software. It may contain minor errors which are inherent in voice recognition technology. **      Final report electronically signed by Dr. Kaur Nguyen on 9/15/2017 5:13 PM      XR Chest Portable   Final Result   1. Findings consistent with small to moderate size right pleural effusion with underlying atelectasis. Also in the differential would be early infiltrate but this is less favored.  Recommend a true PA and lateral chest x-ray preferably in the [E03.9]        1. Encephalopathy- not sure why not better since ammonia nl; will do ct head, eeg  2. getachew- start fluids  3. Have palliative care see  4.  Consult soc service        Electronically signed by Nino Frazier MD on 9/24/2017 at 8:42 AM

## 2017-09-25 PROBLEM — E43 SEVERE MALNUTRITION (HCC): Status: ACTIVE | Noted: 2017-09-25

## 2017-09-25 LAB
ANION GAP SERPL CALCULATED.3IONS-SCNC: 15 MEQ/L (ref 8–16)
BUN BLDV-MCNC: 79 MG/DL (ref 7–22)
CALCIUM SERPL-MCNC: 9.5 MG/DL (ref 8.5–10.5)
CHLORIDE BLD-SCNC: 110 MEQ/L (ref 98–111)
CO2: 19 MEQ/L (ref 23–33)
CREAT SERPL-MCNC: 2.9 MG/DL (ref 0.4–1.2)
FOLATE: 11 NG/ML (ref 4.8–24.2)
GFR SERPL CREATININE-BSD FRML MDRD: 16 ML/MIN/1.73M2
GLUCOSE BLD-MCNC: 175 MG/DL (ref 70–108)
ORGANISM: ABNORMAL
POTASSIUM SERPL-SCNC: 3.1 MEQ/L (ref 3.5–5.2)
SODIUM BLD-SCNC: 144 MEQ/L (ref 135–145)
URINE CULTURE REFLEX: ABNORMAL
URINE CULTURE REFLEX: ABNORMAL
VITAMIN B-12: 1607 PG/ML (ref 211–911)

## 2017-09-25 PROCEDURE — 99232 SBSQ HOSP IP/OBS MODERATE 35: CPT | Performed by: NURSE PRACTITIONER

## 2017-09-25 PROCEDURE — 2500000003 HC RX 250 WO HCPCS: Performed by: INTERNAL MEDICINE

## 2017-09-25 PROCEDURE — 82607 VITAMIN B-12: CPT

## 2017-09-25 PROCEDURE — 97110 THERAPEUTIC EXERCISES: CPT

## 2017-09-25 PROCEDURE — 95819 EEG AWAKE AND ASLEEP: CPT

## 2017-09-25 PROCEDURE — 2580000003 HC RX 258: Performed by: NURSE PRACTITIONER

## 2017-09-25 PROCEDURE — 1200000003 HC TELEMETRY R&B

## 2017-09-25 PROCEDURE — 1200000000 HC SEMI PRIVATE

## 2017-09-25 PROCEDURE — 36415 COLL VENOUS BLD VENIPUNCTURE: CPT

## 2017-09-25 PROCEDURE — 82746 ASSAY OF FOLIC ACID SERUM: CPT

## 2017-09-25 PROCEDURE — 2580000003 HC RX 258: Performed by: INTERNAL MEDICINE

## 2017-09-25 PROCEDURE — 97530 THERAPEUTIC ACTIVITIES: CPT

## 2017-09-25 PROCEDURE — 99232 SBSQ HOSP IP/OBS MODERATE 35: CPT | Performed by: INTERNAL MEDICINE

## 2017-09-25 PROCEDURE — 6360000002 HC RX W HCPCS: Performed by: INTERNAL MEDICINE

## 2017-09-25 PROCEDURE — 99233 SBSQ HOSP IP/OBS HIGH 50: CPT | Performed by: INTERNAL MEDICINE

## 2017-09-25 PROCEDURE — 80048 BASIC METABOLIC PNL TOTAL CA: CPT

## 2017-09-25 RX ORDER — POTASSIUM CHLORIDE 7.45 MG/ML
10 INJECTION INTRAVENOUS
Status: COMPLETED | OUTPATIENT
Start: 2017-09-25 | End: 2017-09-26

## 2017-09-25 RX ORDER — POTASSIUM CHLORIDE 20 MEQ/1
20 TABLET, EXTENDED RELEASE ORAL ONCE
Status: DISCONTINUED | OUTPATIENT
Start: 2017-09-25 | End: 2017-09-25

## 2017-09-25 RX ORDER — POTASSIUM CHLORIDE 7.45 MG/ML
20 INJECTION INTRAVENOUS
Status: DISCONTINUED | OUTPATIENT
Start: 2017-09-25 | End: 2017-09-25

## 2017-09-25 RX ADMIN — Medication 10 ML: at 20:04

## 2017-09-25 RX ADMIN — POTASSIUM CHLORIDE 10 MEQ: 7.46 INJECTION, SOLUTION INTRAVENOUS at 20:04

## 2017-09-25 RX ADMIN — CEFTRIAXONE 1 G: 1 INJECTION, POWDER, FOR SOLUTION INTRAMUSCULAR; INTRAVENOUS at 13:12

## 2017-09-25 RX ADMIN — SODIUM BICARBONATE: 84 INJECTION, SOLUTION INTRAVENOUS at 01:10

## 2017-09-25 RX ADMIN — POTASSIUM CHLORIDE 10 MEQ: 7.46 INJECTION, SOLUTION INTRAVENOUS at 22:06

## 2017-09-25 RX ADMIN — HEPARIN SODIUM 5000 UNITS: 5000 INJECTION, SOLUTION INTRAVENOUS; SUBCUTANEOUS at 05:12

## 2017-09-25 RX ADMIN — HEPARIN SODIUM 5000 UNITS: 5000 INJECTION, SOLUTION INTRAVENOUS; SUBCUTANEOUS at 14:49

## 2017-09-25 RX ADMIN — PIPERACILLIN SODIUM,TAZOBACTAM SODIUM 3.38 G: 3; .375 INJECTION, POWDER, FOR SOLUTION INTRAVENOUS at 05:10

## 2017-09-25 RX ADMIN — SODIUM BICARBONATE: 84 INJECTION, SOLUTION INTRAVENOUS at 11:00

## 2017-09-25 RX ADMIN — HEPARIN SODIUM 5000 UNITS: 5000 INJECTION, SOLUTION INTRAVENOUS; SUBCUTANEOUS at 23:35

## 2017-09-25 ASSESSMENT — PAIN SCALES - GENERAL: PAINLEVEL_OUTOF10: 0

## 2017-09-25 NOTE — PROGRESS NOTES
[facility] Laboratory Technician Worksheet      EEG Date: 2017    Name: Tala Myrick   : 1940   Age: 68 y.o.   SEX: female    ROOM: [address] [place] [address] MRN: [id number]           CSN: [id number]    Ordering Provider: Kary Coreas  EEG Number: [id number] Time of Test:  8833    Hand: -   Sedation: no    H.V. Done: No not attempted Photic: Yes    Sleep: No  Drowsy: Yes   Sleep Deprived: No    Seizures observed: no    Technician Impression:2    Mentality: lethargic      Clinical History:  DX:  AMS   Hepatic encephalopathy   Confusion   Delirium  ammonia normal, but still not at baseline mentally   Head CT shows no acute process, moderate chronic small vessel ischemic changes     13 normal EEG    Past Medical History:       Diagnosis Date    Anemia     Arthritis     Blood transfusion reaction     Cancer (HCC)     colon in polyps    Cirrhosis (HCC)     biliary    Heart murmur     Hernia     Hypertension     Thyroid disease     Unspecified diseases of blood and blood-forming organs     anemia       Scheduled Meds:   potassium replacement protocol   Other RX Placeholder    cefTRIAXone (ROCEPHIN) IV  1 g Intravenous Q24H    heparin (porcine)  5,000 Units Subcutaneous 3 times per day    vitamin D  5,000 Units Oral Daily    rifaximin  550 mg Oral BID    levothyroxine  112 mcg Oral Daily    ursodiol  300 mg Oral BID    cinacalcet  30 mg Oral Daily    famotidine  20 mg Oral Daily    amLODIPine  10 mg Oral Daily    nicotine  1 patch Transdermal Daily    phosphorus replacement protocol   Other RX Placeholder    lactulose  20 g Oral TID    sodium chloride flush  10 mL Intravenous 2 times per day     Continuous Infusions:   sodium bicarbonate infusion       PRN Meds:.melatonin, haloperidol lactate, acetaminophen, magnesium hydroxide, ondansetron, potassium chloride, magnesium sulfate, labetalol, sodium chloride flush    Technician: Rica Haddad 2017

## 2017-09-25 NOTE — PROGRESS NOTES
1630: Updated on pt status by Ramona Salgado. Pt resting in bed and does not respond during conversation with daughter. Pt is not awakened. Reviewed goals of care with daughter. At this time family is awaiting EEG report. Family also discussed transfer to ECF at time of discharge. We discuss goals at discharge if pt condition does not improve. Reviewed comfort care/hospice if pt status does not improve. Will continue to follow and support.

## 2017-09-25 NOTE — PROGRESS NOTES
Nutrition Assessment    Type and Reason for Visit: Reassess    Nutrition Recommendations: Diet per SLP & MD. Recommend starting a liquid Multivitamin w/minerals daily. If TF is desired, recommend starting Nepro Carb Steady at 35 ml/hr x 24 hrs to meet 100% of energy and protein needs. Malnutrition Assessment:  · Malnutrition Status: Meets the criteria for severe malnutrition  · Context: Acute illness or injury  · Findings of the 6 clinical characteristics of malnutrition (Minimum of 2 out of 6 clinical characteristics is required to make the diagnosis of moderate or severe Protein Calorie Malnutrition based on AND/ASPEN Guidelines):  1. Energy Intake-Less than or equal to 50%, greater than or equal to 5 days    2. Muscle Loss-Moderate muscle mass loss, Temples (temporalis muscle), Calf (gastrocnemius)    Nutrition Diagnosis:   · Problem: Severe malnutrition, in context of acute illness or injury  · Etiology: related to Difficulty swallowing     Signs and symptoms:  as evidenced by Diet history of poor intake, Intake 0-25%, Moderate muscle loss    Nutrition Assessment:  · Subjective Assessment: Pt and family seen- pt unable to be woke during visit. Family reports she struggles with swallowing food and states she has not been eating hardly anything over the past several days. Per RN, she tried to provide pt with very small sips of water and she was unable to swallow that even with directions to swallow. SLP has been trying to see pt but pt is unable to maintain alertness for functional cognitive therapy. RD available in artifical nutrition is desired. Labs: BUN 79, Cr. 2.9, Glucose 175 on 9/25.   · Wound Type: None  · Current Nutrition Therapies:  · Oral Diet Orders: Cardiac diet ordered;  NPO per RN  · Oral Diet intake: 0%  · Oral Nutrition Supplement (ONS) Orders: Frozen Oral Supplement- to start  · Anthropometric Measures:  · Ht: 5' 1\" (154.9 cm)   · Current Body Wt: 164 lb 9.3 oz (74.7 kg) (+3 pitting edema BLE)  · Admission Body Wt: 138 lb 10.7 oz (62.9 kg) ((9/15))  · Usual Body Wt: 131 lb (59.4 kg) ((2/16/17) per hospital records)  · % Weight Change: +18,9% weight gain in one week per chart review; +3 pitting edema in BLE  · Ideal Body Wt: 105 lb (47.6 kg), % Ideal Body 156%  · Adjusted Body Wt: 119 lb 12 oz (54.3 kg), body weight adjusted for    · BMI Classification: BMI 30.0 - 34.9 Obese Class I (31.2)  · Comparative Standards (Estimated Nutrition Needs):  · Estimated Daily Total Kcal: 0680-3362 kcal/day (25-30 kcal/kg based on adj. wt. of 54.3 kg)  · Estimated Daily Protein (g): 54-65 g/day    Estimated Intake vs Estimated Needs: Intake Less Than Needs    Nutrition Risk Level: High    Nutrition Interventions:   Continue current diet, Start ONS, Vitamin Supplement  Continued Inpatient Monitoring, Education not appropriate at this time    Nutrition Evaluation:   · Evaluation: Progress towards goals declining   · Goals: Pt. will consume 75% or more at meals during LOS. · Monitoring: Meal Intake, Supplement Intake, Diet Tolerance, Chewing/Swallowing, Ascites/Edema, Fluid Balance, Weight, Comparative Standards, Pertinent Labs    See Adult Nutrition Doc Flowsheet for more detail.      Electronically signed by Mary Gudino RD, HOLLY on 9/25/17 at 3:44 PM    Contact Number: [phone]

## 2017-09-25 NOTE — PROGRESS NOTES
secondary to short ELOS    PT G-Codes  Functional Assessment Tool Used: professional judgement  Functional Limitation: Mobility: Walking and moving around  Mobility: Walking and Moving Around Current Status (): At least 40 percent but less than 60 percent impaired, limited or restricted  Mobility: Walking and Moving Around Goal Status ():  At least 1 percent but less than 20 percent impaired, limited or restricted

## 2017-09-25 NOTE — PROGRESS NOTES
Endocrinology Progress Note    Patient:  Carla Prasad      Unit/Bed:5K-04/004-A    YOB: 1940    MRN: [id number]     Acct: [de-identified]     Admit date: 9/15/2017    Patient Seen, Chart, Consults notes, Labs, Radiology studies reviewed. Subjective:   CC: Hypercalcemia    Up in chair. Resting with eye closed. Arouses easily. Remains confused. Diet:  DIET CARDIAC; Dietary Nutrition Supplements: Low Calorie High Protein Supplement    Medications:  Scheduled Meds:   potassium replacement protocol   Other RX Placeholder    cefTRIAXone (ROCEPHIN) IV  1 g Intravenous Q24H    heparin (porcine)  5,000 Units Subcutaneous 3 times per day    vitamin D  5,000 Units Oral Daily    rifaximin  550 mg Oral BID    levothyroxine  112 mcg Oral Daily    ursodiol  300 mg Oral BID    cinacalcet  30 mg Oral Daily    famotidine  20 mg Oral Daily    amLODIPine  10 mg Oral Daily    nicotine  1 patch Transdermal Daily    phosphorus replacement protocol   Other RX Placeholder    lactulose  20 g Oral TID    sodium chloride flush  10 mL Intravenous 2 times per day     Continuous Infusions:   sodium bicarbonate infusion       PRN Meds:melatonin, haloperidol lactate, acetaminophen, magnesium hydroxide, ondansetron, potassium chloride, magnesium sulfate, labetalol, sodium chloride flush    Objective:    CBC:   Recent Labs      09/23/17   0534  09/24/17   0650   WBC  10.2  10.5   HGB  9.9*  9.8*   PLT  64*  82*     BMP:  Recent Labs      09/23/17   0534  09/24/17   0555  09/25/17   0716   NA  142  146*  144   K  4.6  3.7  3.1*   CL  111  110  110   CO2  13*  16*  19*   BUN  67*  77*  79*   CREATININE  2.5*  3.0*  2.9*   GLUCOSE  127*  94  175*     Calcium:  Recent Labs      09/25/17   0716   CALCIUM  9.5     Ionized Calcium:No results for input(s): IONCA in the last 72 hours.   Magnesium:  Recent Labs      09/24/17   0555   MG  2.0     Phosphorus:  Recent Labs      09/24/17   0555   PHOS  5.0*     BNP:No results for pericholecystic edema. Acalculous cholecystitis is a possibility. **This report has been created using voice recognition software. It may contain minor errors which are inherent in voice recognition technology. ** Final report electronically signed by Dr. Sharita Lemus on 9/15/2017 7:23 PM    Us Gallbladder Ruq    Result Date: 9/15/2017  PROCEDURE: US GALLBLADDER RUQ, US LIVER CLINICAL INFORMATION: CIRRHOSIS, HEPATITIS,  . COMPARISON: No prior study. TECHNIQUE: Grayscale and color Doppler ultrasound FINDINGS: Exam is somewhat limited due to patient condition. Liver Liver is somewhat decreased in size. No focal mass or intrahepatic ductal dilatation is seen. There are multiple tortuous vessels at the new hepatis. There is possible recannulated the umbilicus vein. There are multiple hypoechoic masses at the new hepatis indicating periportal pericaval adenopathy. Some of these are as large as 9.7 cm. The gallbladder shows no evidence of stones there is gallbladder wall thickening and pericholecystic edema and sludge within the gallbladder. Common bile duct is not enlarged. 1. Cirrhotic liver with possible recannulated emboli: Vein. 2. Marked periportal pericaval adenopathy. Follow-up with CT scan is recommended. 3. Gallbladder sludge with gallbladder wall thickening and pericholecystic edema. Acalculous cholecystitis is a possibility. **This report has been created using voice recognition software. It may contain minor errors which are inherent in voice recognition technology. ** Final report electronically signed by Dr. Sharita Lemus on 9/15/2017 7:23 PM    Xr Chest Portable    Result Date: 9/15/2017  PROCEDURE: XR CHEST PORTABLE CLINICAL INFORMATION: NG tube placement,  . COMPARISON: 9/15/2017 at 1740 TECHNIQUE: Portable supine FINDINGS: NG tube is been advanced and is well into the stomach. Distal tip is not included on the image. There are no other changes.      NG tube is well into the stomach **This report has been created using voice recognition software. It may contain minor errors which are inherent in voice recognition technology. ** Final report electronically signed by Dr. Reena Neville on 9/15/2017 7:10 PM    Xr Chest Portable    Result Date: 9/15/2017  PROCEDURE: XR CHEST PORTABLE CLINICAL INFORMATION: NG placement,  . COMPARISON: 9/15/2017 TECHNIQUE: Portable supine FINDINGS: NG tube extends to the distal esophagus. It needs to be advanced 7 cm to be in the proximal stomach. There are no other changes. NG tube in the distal esophagus **This report has been created using voice recognition software. It may contain minor errors which are inherent in voice recognition technology. ** Final report electronically signed by Dr. Reena Neville on 9/15/2017 6:10 PM    Xr Chest Portable    Result Date: 9/15/2017  PROCEDURE: XR CHEST PORTABLE CLINICAL INFORMATION: AMS, . COMPARISON: PA and lateral chest x-ray December 5, 2015. TECHNIQUE: AP upright view of the chest. FINDINGS: The heart size remains mildly enlarged. The mediastinum is not widened. There is mildly increased density in the right lower lobe mostly in the right infrahilar region. There is obscuration to moderate degree the right lateral costophrenic angle. The rest of the lungs are clear. The pulmonary vascularity is normal. No suspicious osseous lesions are present. 1.  Findings consistent with small to moderate size right pleural effusion with underlying atelectasis. Also in the differential would be early infiltrate but this is less favored. Recommend a true PA and lateral chest x-ray preferably in the x-ray department if patient can tolerate. **This report has been created using voice recognition software. It may contain minor errors which are inherent in voice recognition technology. ** Final report electronically signed by Dr. Annetta Soliz on 9/15/2017 9:14 AM    Us Abdominal Limited    Result Date: 9/15/2017  PROCEDURE: Curt Regan CLINICAL INFORMATION: Cirrhosis with ascites,  . COMPARISON: None available. TECHNIQUE: Jasmine Collin scale sonographic imaging of the 4 quadrants of the abdomen performed for localization of ascites. FINDINGS: There is a trace amount of free fluid deep to the abdominal wall at the left midabdomen. No large fluid collection is identified. Trace amount of fluid within the abdomen. Insufficient quantity of ascites to safely perform an ultrasound-guided paracentesis. **This report has been created using voice recognition software. It may contain minor errors which are inherent in voice recognition technology. ** Final report electronically signed by Dr. Marjorie Hayes on 9/15/2017 5:13 PM       Physical Exam:  Vitals: /65  Pulse 64  Temp 97.6 °F (36.4 °C) (Axillary)   Resp 16  Ht 5' 1\" (1.549 m)  Wt 164 lb 9.3 oz (74.7 kg)  SpO2 94%  BMI 31.1 kg/m2  24 hour intake/output:  Intake/Output Summary (Last 24 hours) at 09/25/17 1023  Last data filed at 09/25/17 0415   Gross per 24 hour   Intake             1799 ml   Output             1650 ml   Net              149 ml     Last 3 weights: Wt Readings from Last 3 Encounters:   09/23/17 164 lb 9.3 oz (74.7 kg)   02/16/17 131 lb (59.4 kg)   12/10/15 122 lb (55.3 kg)       General:  Asleep, arouses easily, not in distress. HEENT: Atraumatic, normocephalic. Pink conjunctiva, Anicteric sclera. Pink and moist oral mucosa. Trachea midline. Neck supple. Chest: Bilateal air entry, Clear to auscultation, no wheezing, rhonchi or rales. Cardiovascular: RRR, V9I6, + murmur, click, rub or gallop. No lower extremity edema. Pedal and posterior tibialis 2+. Abdomen: Soft, non tender to palpation. Active bowel sounds x 4 quadrants. Musculoskeletal: passive and active ROM x 4 extremities. Integumentary: Pink, warm and dry. Free from rash or lesions. CNS: Oriented to person only. Speech clear. Normal sensation. Face symmetrical. No tremor.    Psych: Mood and Affect normal.          Assessment / Plan:    1. Hypercalcemia with elevated PTH, suggesting possible primary hyperparathyroidism. Received Calcitonin SQ x 1 and daily Cinacalcet 30 mg starting 9/18. Calcium stable at 10.2. Normal urinary calcium. Renal US normal. Vitamin D 1.25 dihydroxy 19.3. Sestamibi scan tomorrow. 2. Metabolic acidosis improved with bicarb. 3. Hypothyrodisim. PO Synthroid. TSH 2.410, FT4 1. 12.  4. Hypovitaminosis D. Vit D 10. Do not recommend replacement due to #1.   5. Cirrhosis with hepatic encephalopathy. GI following. Lactulose. Ammonia level has improved. 6. Encephalopathy, multifactorial with #1/4/5.  7. Right lower lobe pneumonia, Pulmonary/ID following. 8. Right pleural effusion. 9. Acute kidney injury, nephrology following. Dispo: Parathyroid scan tomorrow. Electronically signed by Hayden Mercado CNP on 9/25/2017 at 10:23 AM  Rounding for Dr. Sergio Anaya, Endocrinologist.       Attending addendum:  I have reviewed NP note and relevant data in detail. I agree with findings and plans of care as documented in NP nurse note.

## 2017-09-25 NOTE — PROGRESS NOTES
Hospitalist Progress Note    Patient:  Tawanna Reynaga      Unit/Bed:5K-04/004-A    YOB: 1940    MRN: [id number]       Acct: [de-identified]     PCP: Luiza Covington    Date of Admission: 9/15/2017    Chief Complaint: adm for altered mental status, thought to be due hepatic encephalopathy; her ammonia now nl and she still ain't right. Hospital Course:  Adm for above. Also mention of urethrovaginal fistula. Has developed gradual getachew. Had hypercalcemia apparently due to hyperpara. Apparently was sharp mentally prior to present illness. Being treated for pneumonia, I am not sure of this dx    She is alert but no speech this am.  Her ammonia was normal 9.24. She has a tremor bilat. Her CT showed no acute process. Subjective: not obtainable       Medications:  Reviewed    Infusion Medications    sodium bicarbonate infusion 75 mL/hr at 09/25/17 0110     Scheduled Medications    potassium replacement protocol   Other RX Placeholder    heparin (porcine)  5,000 Units Subcutaneous 3 times per day    vitamin D  5,000 Units Oral Daily    rifaximin  550 mg Oral BID    levothyroxine  112 mcg Oral Daily    ursodiol  300 mg Oral BID    cinacalcet  30 mg Oral Daily    famotidine  20 mg Oral Daily    amLODIPine  10 mg Oral Daily    nicotine  1 patch Transdermal Daily    phosphorus replacement protocol   Other RX Placeholder    lactulose  20 g Oral TID    sodium chloride flush  10 mL Intravenous 2 times per day     PRN Meds: melatonin, haloperidol lactate, acetaminophen, magnesium hydroxide, ondansetron, potassium chloride, magnesium sulfate, labetalol, sodium chloride flush      Intake/Output Summary (Last 24 hours) at 09/25/17 0920  Last data filed at 09/25/17 0415   Gross per 24 hour   Intake             1799 ml   Output             1650 ml   Net              149 ml       Diet:  DIET CARDIAC;   Dietary Nutrition Supplements: Low Calorie High Protein Supplement    Exam:  /65  Pulse 64  Temp 97.6 °F (36.4 °C) (Axillary)   Resp 16  Ht 5' 1\" (1.549 m)  Wt 164 lb 9.3 oz (74.7 kg)  SpO2 94%  BMI 31.1 kg/m2    General appearance:Chronically ill appearing  Keeps eyes closed most of visit; mumbles  HEENT: Pupils equal, round, and reactive to light. Conjunctivae/corneas clear. Neck: Supple, with full range of motion. No jugular venous distention. Trachea midline. Respiratory:   Diminished br sds  Cardiovascular: Regular rate and rhythm with normal S1/S2 without murmurs, rubs or gallops. Abdomen: Soft, non-tender, non-distended with normal bowel sounds. Ventral hernia  Musculoskeletal: No clubbing, cyanosis or edema bilaterally. Full range of motion without deformity. Skin: pale, appears dry  Neurologic:   Alert no speech; does not know where she is  Psychiatric: above        Labs:   Recent Labs      09/23/17   0534  09/24/17   0650   WBC  10.2  10.5   HGB  9.9*  9.8*   HCT  29.9*  30.0*   PLT  64*  82*     Recent Labs      09/23/17   0534  09/24/17   0555  09/25/17   0716   NA  142  146*  144   K  4.6  3.7  3.1*   CL  111  110  110   CO2  13*  16*  19*   BUN  67*  77*  79*   CREATININE  2.5*  3.0*  2.9*   CALCIUM  10.4  10.2  9.5   PHOS  5.1*  5.0*   --      Recent Labs      09/23/17   0534  09/24/17   0555   AST  27  21   ALT  17  15   BILITOT  1.7*  1.5*   ALKPHOS  168*  173*     No results for input(s): INR in the last 72 hours. No results for input(s): Lella Gomez in the last 72 hours. Urinalysis:      Lab Results   Component Value Date    NITRU NEGATIVE 09/21/2017    WBCUA 10-15 09/21/2017    BACTERIA NONE 09/21/2017    RBCUA 15-20 09/21/2017    BLOODU NEGATIVE 09/21/2017    SPECGRAV 1.016 09/15/2017    GLUCOSEU NEGATIVE 09/21/2017       Radiology:  CT Head WO Contrast   Final Result   1. No acute intracranial hemorrhage or mass effect. 2. Moderate chronic small vessel ischemic changes. **This report has been created using voice recognition software.   It may contain minor errors which are inherent in voice recognition technology. **      Final report electronically signed by Dr. Lizbeth Carson on 9/24/2017 9:49 AM      US RENAL LIMITED   Final Result   1. Echogenic right renal cortex, possibly secondary to medical renal disease, but otherwise unremarkable renal ultrasound. 2. Aguilar catheter in a nondistended urinary bladder. Final report electronically signed by Dr. Adrianne Vivar on 9/22/2017 8:24 AM      NM HEPATOBILIARY   Final Result   Patent cystic and common bile ducts without evidence of acute cholecystitis. Final report electronically signed by Dr. Adrianne Vivar on 9/21/2017 4:39 PM      XR Chest Portable   Final Result   1. Mild cardiomegaly Moderate calcification of the mitral annulus. 2. Mild bibasilar atelectasis/pneumonia. Overall appearance of chest slightly worse than prior. **This report has been created using voice recognition software. It may contain minor errors which are inherent in voice recognition technology. **      Final report electronically signed by Dr. Eric Gregg on 9/21/2017 2:25 PM      XR Chest Standard TWO VW   Final Result   1. Mild cardiomegaly. 2. Small bilateral pleural effusions. 3. Mild bibasilar atelectasis/pneumonia. 4. Overall appearance of chest improved from prior. **This report has been created using voice recognition software. It may contain minor errors which are inherent in voice recognition technology. **      Final report electronically signed by Dr. Eric Gregg on 9/19/2017 9:55 AM      CT CHEST WO CONTRAST   Final Result   1. Small right-sided pleural effusion. 2. Bilateral pneumonia, worse on the right side. 3. Stable cardiomegaly. 4. Cirrhosis. 5. Ventral hernia containing part of the stomach and proximal small bowel. Final report electronically signed by Dr. Adrianne Vivar on 9/17/2017 4:20 PM      US LIVER   Final Result   1.  Cirrhotic liver with possible inherent in voice recognition technology. **      Final report electronically signed by Dr. Ayde Norman on 9/15/2017 5:13 PM      XR Chest Portable   Final Result   1. Findings consistent with small to moderate size right pleural effusion with underlying atelectasis. Also in the differential would be early infiltrate but this is less favored. Recommend a true PA and lateral chest x-ray preferably in the x-ray    department if patient can tolerate. **This report has been created using voice recognition software. It may contain minor errors which are inherent in voice recognition technology. **      Final report electronically signed by Dr. Luis Enrique Hernández on 9/15/2017 9:14 AM      NM PARATHYROID SCAN    (Results Pending)       Diet: DIET CARDIAC; Dietary Nutrition Supplements: Low Calorie High Protein Supplement    DVT prophylaxis: [] Lovenox                                 [] SCDs                                 [x] SQ Heparin                                 [] Encourage ambulation           [] Already on Anticoagulation     Disposition:    [] Home       [] TCU       [] Rehab       [] Psych       [] SNF       [] [place]       [x] Other-? Code Status: Limited    PT/OT Eval Status:  active and ongoing      Assessment/Plan:    Anticipated Discharge in : ?    C/Mio Estrella [address] Problems    Diagnosis Date Noted    Hyperparathyroidism ([facility] [E21.3] 09/24/2017    Bilateral leg edema [Z53.3]     Metabolic acidosis [K42.2]     Hypothermia, not POA [T68. XXXA] 09/21/2017    Sludge in gallbladder [K82.8]     [facility] ([facility] [K72.90]     Acquired hypothyroidism [E03.9]     Hypovitaminosis D [E55.9]     Pneumonia, organism unspecified [J18.9]     BEN (acute kidney injury) ([facility] [N17.9]     Dehydration [E86.0]     Acute renal failure, not POA [N17.9] 09/17/2017    Hypercalcemia, not POA [E83.52] 09/16/2017    Hypertensive urgency [I16.0] 09/15/2017    Urethrovaginal fistula [N82.1] 09/15/2017    Pleural effusion [J90] 09/15/2017    Acute hepatic encephalopathy [K72.00] 09/15/2017    Thrombocytopenia ([facility] [D69.6] 09/15/2017    Microcytic anemia [D50.9] 09/15/2017    Hyperbilirubinemia [E80.6] 09/15/2017    Hypokalemia [E87.6] 09/15/2017    Urinary retention [R33.9]     Primary biliary cirrhosis ([facility] [K74.3] 04/15/2014    History of atrial fibrillation [Z86.79] 04/10/2014    Essential hypertension [I10]     Cirrhosis of liver with ascites (HCC) [K74.60]     Hypothyroidism [E03.9]        1. Encephalopathy- not sure why not better since ammonia nl; will consult neurology  2. getachew- started fluids  3. Have palliative care see  4.  Consult soc service        Electronically signed by Jen Corona MD on 9/25/2017 at 9:20 AM

## 2017-09-25 NOTE — PLAN OF CARE
Problem: Falls - Risk of  Goal: Absence of falls  Outcome: Ongoing  Bed alarm and three rails up. SHe makes no attempt to get up but when the bottom rails were both down her leg would slide to the edge and she was unable to move it back. Problem: Nutrition  Goal: Optimal nutrition therapy  Outcome: Not Met This Shift  This am she was aware and able to say her name and that she was in the hospital and again this afternoon. Dr Ej Olvera suggested we try to feed her. I started with very small sips of water and she was unable to swallow that even with directions to swallow. SHe let it pool in the side of her mouth and each time she attempted to swallow she would wink with her left eye. Oral care given and no further attempts at oral feeding. Problem: Risk for Impaired Skin Integrity  Goal: Tissue integrity - skin and mucous membranes  Structural intactness and normal physiological function of skin and  mucous membranes. Outcome: Ongoing  Turned and repositioned every two hours. Problem: Airway Clearance - Ineffective:  Goal: Ability to maintain a clear airway will improve  Ability to maintain a clear airway will improve   Outcome: Ongoing  She has been able to maintain a clear airway and did not need suctioned this shift. Problem: Aspiration:  Goal: Absence of aspiration  Absence of aspiration   Outcome: Ongoing  Aspiration precautions continued with my attempt to give her small sips of water and she tolerated oral care without coughing. Problem: Mental Status - Impaired:  Goal: Mental status will be restored to baseline  Mental status will be restored to baseline   Outcome: Ongoing  Her mental status fluctuates thoughout the day. At times she can say her name and where she is though her speech is soft and slurred. At other times she is unable to respond verbally and at times she is lying in bed with one eye open and and the other eye only half open.

## 2017-09-25 NOTE — FLOWSHEET NOTE
During my contact with the Palliative Care patient, there were no family members present and no response. I offered prayer of comfort and healing at pts bedside. I also placed a spiritual care card in the room. Spiritual plan: It would be helpful if Spiritual Care would continue to follow up on this case.       09/25/17 1320   Encounter Summary   Services provided to: Patient   Referral/Consult From: [facility] Family members   Continue Visiting Yes  (9/25/17 Palliative Care pt. )   Complexity of Encounter Moderate   Length of Encounter 15 minutes   Routine   Type Follow up   Assessment Coping;Sleeping   Intervention Nurtured hope   Outcome Coping;Did not respond   Spiritual/Jew   Type Spiritual support

## 2017-09-25 NOTE — PLAN OF CARE
Problem: Falls - Risk of  Goal: Absence of falls  Outcome: Ongoing  Fall assessment complete. Bed in low position, call light an personal items are with in reach, 2 of 4 siderails are up. Tele sitter is in room. Patient is turned every 2 hours     Problem: Nutrition  Goal: Optimal nutrition therapy  Outcome: Ongoing  Oral care given and no attempts at oral feeding. Patient said her name at the beginning of the shift, and when awake would nod yes or no to questions. Problem: Risk for Impaired Skin Integrity  Goal: Tissue integrity - skin and mucous membranes  Structural intactness and normal physiological function of skin and  mucous membranes. Outcome: Ongoing  Turned and repositioned every two hours. Problem: Airway Clearance - Ineffective: Intervention: Airway assessment  She has been able to maintain a clear airway, LS are clear. Problem: Bowel Function - Altered:  Intervention: Assess bowel function  Patient has an ABD hernia. Bowel sounds active x4. Patient ABD is soft, nontender, nondistended, no gauding. No BM this shift. Problem: Mental Status - Impaired:  Goal: Mental status will be restored to baseline  Mental status will be restored to baseline   Outcome: Ongoing  Her mental status fluctuates thoughout the day. At times she can say her name and where she is though her speech is soft and slurred. At other times she is unable to respond verbally and at times she is lying in bed with one eye open and and the other eye only half open. Comments:   Care plan reviewed with patient. Patient verbalize understanding of the plan of care and contribute to goal setting.

## 2017-09-25 NOTE — PROGRESS NOTES
Progress note: Infectious diseases    Patient - Jerrica Leon,  Age - 68 y.o.    - 1940      Room Number - 5K-04/004-A   MRN -  [id number]   Acct # - [de-identified]  Date of Admission -  9/15/2017  4:22 AM    SUBJECTIVE:   No new issues. Patient is still confused. OBJECTIVE   VITALS    height is 5' 1\" (1.549 m) and weight is 164 lb 9.3 oz (74.7 kg). Her axillary temperature is 97.6 °F (36.4 °C). Her blood pressure is 134/65 and her pulse is 64. Her respiration is 16 and oxygen saturation is 94%. Wt Readings from Last 3 Encounters:   17 164 lb 9.3 oz (74.7 kg)   17 131 lb (59.4 kg)   12/10/15 122 lb (55.3 kg)       I/O (24 Hours)    Intake/Output Summary (Last 24 hours) at 17 1221  Last data filed at 17 0415   Gross per 24 hour   Intake             1799 ml   Output             1650 ml   Net              149 ml       General Appearance  lethargic  HEENT - normocephalic, atraumatic, pink conjunctiva,  anicteric sclera  Neck - Supple, no mass  Lungs -  Bilateral  air entry, + rhonchi,   Cardiovascular - Heart sounds are normal.    Abdomen - soft, not distended, nontender, epigastric hernia  Neurologic -lethargic  Skin - No bruising or bleeding  Extremities -+ edema both feet.     MEDICATIONS:      potassium replacement protocol   Other RX Placeholder    cefTRIAXone (ROCEPHIN) IV  1 g Intravenous Q24H    heparin (porcine)  5,000 Units Subcutaneous 3 times per day    vitamin D  5,000 Units Oral Daily    rifaximin  550 mg Oral BID    levothyroxine  112 mcg Oral Daily    ursodiol  300 mg Oral BID    cinacalcet  30 mg Oral Daily    famotidine  20 mg Oral Daily    amLODIPine  10 mg Oral Daily    nicotine  1 patch Transdermal Daily    phosphorus replacement protocol   Other RX Placeholder    lactulose  20 g Oral TID    sodium chloride flush  10 mL Intravenous 2 times per day      sodium bicarbonate infusion 75 mL/hr at 09/25/17 1219     melatonin, haloperidol lactate, acetaminophen, magnesium hydroxide, ondansetron, potassium chloride, magnesium sulfate, labetalol, sodium chloride flush      LABS:     CBC:   Recent Labs      09/23/17   0534  09/24/17   0650   WBC  10.2  10.5   HGB  9.9*  9.8*   PLT  64*  82*     BMP:    Recent Labs      09/23/17   0534  09/24/17   0555  09/25/17   0716   NA  142  146*  144   K  4.6  3.7  3.1*   CL  111  110  110   CO2  13*  16*  19*   BUN  67*  77*  79*   CREATININE  2.5*  3.0*  2.9*   GLUCOSE  127*  94  175*     Calcium:  Recent Labs      09/25/17   0716   CALCIUM  9.5     Ionized Calcium:No results for input(s): IONCA in the last 72 hours. Magnesium:  Recent Labs      09/24/17   0555   MG  2.0     Phosphorus:  Recent Labs      09/24/17   0555   PHOS  5.0*     BNP:No results for input(s): BNP in the last 72 hours. Glucose:No results for input(s): POCGLU in the last 72 hours. HgbA1C: No results for input(s): LABA1C in the last 72 hours. INR: No results for input(s): INR in the last 72 hours. Hepatic:   Recent Labs      09/23/17   0534  09/24/17   0555   ALKPHOS  168*  173*   ALT  17  15   AST  27  21   PROT  5.3*  5.0*   BILITOT  1.7*  1.5*   LABALBU  2.1*  2.0*     Amylase and Lipase:No results for input(s): LACTA, AMYLASE in the last 72 hours. Lactic Acid: No results for input(s): LACTA in the last 72 hours. Troponin: No results for input(s): CKTOTAL, CKMB, TROPONINI in the last 72 hours. BNP: No results for input(s): BNP in the last 72 hours. CULTURES:   UA:   No results for input(s): SPECGRAV, PHUR, COLORU, CLARITYU, MUCUS, PROTEINU, BLOODU, RBCUA, WBCUA, BACTERIA, NITRU, GLUCOSEU, BILIRUBINUR, UROBILINOGEN, KETUA, LABCAST, LABCASTTY, AMORPHOS in the last 72 hours.     Invalid input(s): CRYSTALS  Micro:   Lab Results   Component Value Date    BC No growth-preliminary 09/21/2017         IMAGING:         Problem list of patient:     Patient Active Problem List   Diagnosis Code    GIST (gastrointestinal stromal tumor), non-malignant D21.4    Essential hypertension I10    [facility] with ascites ([facility] K74.60    Cancer ([facility] C80.1    Hypothyroidism E03.9    Dizzy spells R42    Fever R50.9    Epigastric pain R10.13    History of atrial fibrillation Z86.79    Primary biliary cirrhosis ([facility] K74.3    Hypertensive urgency I16.0    Urethrovaginal fistula N82.1    Pleural effusion J90    Acute hepatic encephalopathy K72.00    Thrombocytopenia (HCC) D69.6    Microcytic anemia D50.9    Hyperbilirubinemia E80.6    Hypokalemia E87.6    Urinary retention R33.9    Hypercalcemia, not POA E83.52    Acute renal failure, not POA N17.9    Pneumonia, organism unspecified J18.9    BEN (acute kidney injury) ([facility] N17.9    Dehydration E86.0    Acquired hypothyroidism E03.9    Hypovitaminosis D E55.9    Hepatic encephalopathy (HCC) K72.90    Hypothermia, not POA T68. XXXA    Sludge in gallbladder K82.8    Hyperparathyroidism ([facility] E21.3    Bilateral leg edema O66.4    Metabolic acidosis W86.3         ASSESSMENT/PLAN   -Biliary Cirrhosis with encephalopathy: she is still confused at risk of aspiration  Pneumonia suspected aspiration  Hypothermia:better  BEN  Continue current supportive treatment.   Long term prognosis is poor  Marcella Woo MD, FACP 9/25/2017 12:21 PM

## 2017-09-26 ENCOUNTER — HOSPITAL ENCOUNTER (INPATIENT)
Dept: NUCLEAR MEDICINE | Age: 77
Discharge: HOME | DRG: 441 | End: 2017-09-26
Attending: ANESTHESIOLOGY
Payer: MEDICARE

## 2017-09-26 DIAGNOSIS — E83.52 HYPERCALCEMIA: ICD-10-CM

## 2017-09-26 PROBLEM — N17.0 ACUTE RENAL FAILURE WITH TUBULAR NECROSIS (HCC): Status: ACTIVE | Noted: 2017-09-17

## 2017-09-26 LAB
ANION GAP SERPL CALCULATED.3IONS-SCNC: 18 MEQ/L (ref 8–16)
BUN BLDV-MCNC: 75 MG/DL (ref 7–22)
CALCIUM SERPL-MCNC: 9.4 MG/DL (ref 8.5–10.5)
CHLORIDE BLD-SCNC: 111 MEQ/L (ref 98–111)
CO2: 20 MEQ/L (ref 23–33)
CREAT SERPL-MCNC: 2.7 MG/DL (ref 0.4–1.2)
FOLATE: 10.9 NG/ML (ref 4.8–24.2)
GFR SERPL CREATININE-BSD FRML MDRD: 17 ML/MIN/1.73M2
GLUCOSE BLD-MCNC: 151 MG/DL (ref 70–108)
MAGNESIUM: 1.9 MG/DL (ref 1.6–2.4)
PHOSPHORUS: 4.2 MG/DL (ref 2.4–4.7)
POTASSIUM SERPL-SCNC: 3.3 MEQ/L (ref 3.5–5.2)
SODIUM BLD-SCNC: 149 MEQ/L (ref 135–145)
VITAMIN B-12: 1701 PG/ML (ref 211–911)

## 2017-09-26 PROCEDURE — 1200000000 HC SEMI PRIVATE

## 2017-09-26 PROCEDURE — A9500 TC99M SESTAMIBI: HCPCS | Performed by: NURSE PRACTITIONER

## 2017-09-26 PROCEDURE — 99233 SBSQ HOSP IP/OBS HIGH 50: CPT | Performed by: INTERNAL MEDICINE

## 2017-09-26 PROCEDURE — 36415 COLL VENOUS BLD VENIPUNCTURE: CPT

## 2017-09-26 PROCEDURE — 92526 ORAL FUNCTION THERAPY: CPT

## 2017-09-26 PROCEDURE — 6360000002 HC RX W HCPCS: Performed by: INTERNAL MEDICINE

## 2017-09-26 PROCEDURE — 82746 ASSAY OF FOLIC ACID SERUM: CPT

## 2017-09-26 PROCEDURE — 82607 VITAMIN B-12: CPT

## 2017-09-26 PROCEDURE — 83735 ASSAY OF MAGNESIUM: CPT

## 2017-09-26 PROCEDURE — 78072 PARATHYRD PLANAR W/SPECT&CT: CPT

## 2017-09-26 PROCEDURE — 80048 BASIC METABOLIC PNL TOTAL CA: CPT

## 2017-09-26 PROCEDURE — 2580000003 HC RX 258: Performed by: INTERNAL MEDICINE

## 2017-09-26 PROCEDURE — 99231 SBSQ HOSP IP/OBS SF/LOW 25: CPT | Performed by: INTERNAL MEDICINE

## 2017-09-26 PROCEDURE — 84100 ASSAY OF PHOSPHORUS: CPT

## 2017-09-26 PROCEDURE — 3430000000 HC RX DIAGNOSTIC RADIOPHARMACEUTICAL: Performed by: NURSE PRACTITIONER

## 2017-09-26 PROCEDURE — 2500000003 HC RX 250 WO HCPCS: Performed by: INTERNAL MEDICINE

## 2017-09-26 PROCEDURE — 1200000003 HC TELEMETRY R&B

## 2017-09-26 PROCEDURE — 6360000002 HC RX W HCPCS: Performed by: ANESTHESIOLOGY

## 2017-09-26 RX ORDER — POTASSIUM CHLORIDE 7.45 MG/ML
10 INJECTION INTRAVENOUS
Status: COMPLETED | OUTPATIENT
Start: 2017-09-26 | End: 2017-09-27

## 2017-09-26 RX ORDER — POTASSIUM CHLORIDE 750 MG/1
40 TABLET, FILM COATED, EXTENDED RELEASE ORAL ONCE
Status: DISCONTINUED | OUTPATIENT
Start: 2017-09-26 | End: 2017-09-26 | Stop reason: CLARIF

## 2017-09-26 RX ADMIN — SODIUM BICARBONATE: 84 INJECTION, SOLUTION INTRAVENOUS at 15:57

## 2017-09-26 RX ADMIN — Medication 27.8 MILLICURIE: at 07:45

## 2017-09-26 RX ADMIN — POTASSIUM CHLORIDE 10 MEQ: 7.46 INJECTION, SOLUTION INTRAVENOUS at 21:30

## 2017-09-26 RX ADMIN — HEPARIN SODIUM 5000 UNITS: 5000 INJECTION, SOLUTION INTRAVENOUS; SUBCUTANEOUS at 14:21

## 2017-09-26 RX ADMIN — CEFTRIAXONE 1 G: 1 INJECTION, POWDER, FOR SOLUTION INTRAMUSCULAR; INTRAVENOUS at 14:10

## 2017-09-26 RX ADMIN — POTASSIUM CHLORIDE 10 MEQ: 7.46 INJECTION, SOLUTION INTRAVENOUS at 18:32

## 2017-09-26 NOTE — PROCEDURES
[address] [place] [address]                         ELECTROENCEPHALOGRAM REPORT    PATIENT NAME: JENSEN RAMON                        :             1940  MED REC NO:   [id number]                            ROOM:            0004  ACCOUNT NO:   [de-identified]                            ADMISSION DATE:  09/15/2017  PROVIDER:     Leadner Cartwright. Wilma Raygoza MD    DATE OF EE2017    REFERRING PHYSICIAN:  Dr. Ej Olvera. CLINICAL HISTORY:  A 59-year-old female with a presentation of altered  mental status, hepatic encephalopathy, delirium, and confusion. This is a 17-channel EEG performed without sleep deprivation. Hyperventilation was not performed. Photic stimulation was performed. The patient is described as lethargic. The background activity is noted to be 7 to 8 Hz in the posterior  parietal area, symmetric, attenuates with eye opening. The patient  was noted to be drowsy during parts of recording. Hyperventilation  was not performed. Light stages of sleep were seen during the  recording. Photic stimulation was performed without abnormality. Excessive slowing was seen in theta range suggestive of a mild cortical  dysfunction such as seen with metabolic causes, medication effects or  nonspecific encephalopathies. There was no evidence of epileptiform  activity appreciated. IMPRESSION:  This is a mildly abnormal EEG due to the excessive  slowing seen in the theta range suggestive of cortical dysfunction such  as seen with encephalopathies. However, there was no evidence of  epileptiform activity appreciated.         Claudia Moore MD    D: 2017 8:34:34       T: 2017 8:36:51     [email or web address]  Job#: [id number]     Doc#: [id number]

## 2017-09-26 NOTE — PROGRESS NOTES
breaks []Fatigued  Plan for Next Session:  Diet monitor   Education:  Learner:  [x]Patient          []Significant Other          []Other  Education provided regarding:  [x]Goals and POC   [x]Diet and swallowing precautions    []Home Exercise Program  [x]Progress and/or discharge information  Method of Education:  [x]Discussion          [x]Demonstration          []Handout          []Other  Evaluation of Education:   [x]Verbalized understanding   []Demonstrates without assistance  [x]Demonstrates with assistance  [x]Needs further instruction     []No evidence of learning                  [x]No family present      Plan: [x]Continue with current plan of care    []Modify current plan of care as follows:    []Discharge patient    Discharge Location:    Services/Supervision Recommended:     [x]Patient continues to require treatment by a licensed therapist to address functional deficits as outlined in the established plan of care.     Tino Abel M.S. Sara 23

## 2017-09-26 NOTE — PROGRESS NOTES
1100: pt currently off unit for tests. Daughter stated last evening she will be here about 1530. Will plan to return at that time. 1550: pt daughter not here yet at this time. Pt is sitting in bed and does appear to be more alert. Introduced self to pt and she does not recall me being in room to visit in past days. Pt has been ordered diet and will continue to monitor pt progress and support family.

## 2017-09-26 NOTE — PROGRESS NOTES
per day    vitamin D  5,000 Units Oral Daily    rifaximin  550 mg Oral BID    levothyroxine  112 mcg Oral Daily    ursodiol  300 mg Oral BID    cinacalcet  30 mg Oral Daily    famotidine  20 mg Oral Daily    amLODIPine  10 mg Oral Daily    nicotine  1 patch Transdermal Daily    phosphorus replacement protocol   Other RX Placeholder    lactulose  20 g Oral TID    sodium chloride flush  10 mL Intravenous 2 times per day     Continuous Infusions:   sodium bicarbonate infusion 75 mL/hr at 09/25/17 1100       CBC:   Recent Labs      09/23/17   0534  09/24/17   0650   WBC  10.2  10.5   HGB  9.9*  9.8*   PLT  64*  82*     CMP:  Recent Labs      09/23/17   0534  09/24/17   0555  09/25/17   0716   NA  142  146*  144   K  4.6  3.7  3.1*   CL  111  110  110   CO2  13*  16*  19*   BUN  67*  77*  79*   CREATININE  2.5*  3.0*  2.9*   GLUCOSE  127*  94  175*   CALCIUM  10.4  10.2  9.5   LABGLOM  19*  15*  16*     Troponin: No results for input(s): TROPONINI in the last 72 hours. BNP: No results for input(s): BNP in the last 72 hours. INR: No results for input(s): INR in the last 72 hours. Lipids: No results for input(s): CHOL, LDLDIRECT, TRIG, HDL, AMYLASE, LIPASE in the last 72 hours. Liver: Recent Labs      09/24/17   0555   AST  21   ALT  15   ALKPHOS  173*   PROT  5.0*   LABALBU  2.0*   BILITOT  1.5*     Iron:  No results for input(s): IRONS, FERRITIN in the last 72 hours.     Invalid input(s): LABIRONS    Objective:   Vitals: /62  Pulse 65  Temp 97.3 °F (36.3 °C) (Axillary)   Resp 18  Ht 5' 1\" (1.549 m)  Wt 164 lb 9.3 oz (74.7 kg)  SpO2 93%  BMI 31.1 kg/m2   Wt Readings from Last 3 Encounters:   09/23/17 164 lb 9.3 oz (74.7 kg)   02/16/17 131 lb (59.4 kg)   12/10/15 122 lb (55.3 kg)      24HR INTAKE/OUTPUT:    Intake/Output Summary (Last 24 hours) at 09/25/17 2116  Last data filed at 09/25/17 0415   Gross per 24 hour   Intake             1489 ml   Output             1250 ml   Net              239 ml       Constitutional:  Alert, awake, no apparent distress   Skin:normal   HEENT:Pupils are reactive . Throat is clear   Neck:supple with no thyromegaly  Cardiovascular:  S1, S2 without m/r/g   Respiratory:  CTA B without w/r/r   Abdomen: Soft. Ventral hernia noted. Good bowel sounds. Non-tender. Ext: No LE edema  Musculoskeletal:Intact  Neuro:Alert and awake with no deficit. Speech is better. Obey command.       Electronically signed by Austin Newman MD on 9/25/2017 at 9:16 PM

## 2017-09-26 NOTE — PLAN OF CARE
Problem: Falls - Risk of  Goal: Absence of falls  Outcome: Met This Shift  No falls noted this shift. Patient ambulates with x1 staff assistance without difficulty. Bed kept in low position. Safe environment maintained. Bedside table & call light in reach. Uses call light appropriately when needing assistance. telesitter in room. Bed alarm on. Fall prevention and hourly rounding continued. Fall band and sign posted    Problem: Nutrition  Goal: Optimal nutrition therapy  Outcome: Not Met This Shift  Pt offered medications, but states she can't swallow. Speech therapist here and offered pt bites of food. Pt did not choke on food. Drank iced tea with no problem. Diet changed to dysphagia 2-thin liquids    Problem: Risk for Impaired Skin Integrity  Goal: Tissue integrity - skin and mucous membranes  Structural intactness and normal physiological function of skin and  mucous membranes. Intervention: SKIN ASSESSMENT  Skin remains clean ,dry and intact. Turn q 2 hours and as needed. Problem: Discharge Planning:  Intervention: Assess readiness for discharge  Family plans to have pt possible discharge to [facility]. Problem: Airway Clearance - Ineffective: Intervention: Airway assessment  Enc pt to cough and deep breath. Lungs with rhonchi. Breathing treatments ordered. Problem: Anxiety/Stress:  Intervention: Assess anxiety characteristics  Pt relaxed and fable to rest with eyes closed. Is not exhibiting stress or anxiety at this time      Problem: Aspiration:  Intervention: Assess risk for aspiration  Pt to be up 90 degrees when eating and requires assist to eat. Enc pt to eat slow. No signs or symptoms of aspiration      Problem: Bowel Function - Altered:  Intervention: Assess bowel function  No bowel movement today. Lactulose ordered 3 times a day, but pt refused stated she couldn't swallow.       Problem: Cardiac Output - Decreased:  Goal: Hemodynamic stability will improve  Hemodynamic stability will improve

## 2017-09-26 NOTE — PROGRESS NOTES
Hospitalist Progress Note    Patient:  Marie Walker      Unit/Bed:5K-04/004-A    YOB: 1940    MRN: [id number]       Acct: [de-identified]     PCP: Ramnoa Juarez    Date of Admission: 9/15/2017    Chief Complaint: adm for altered mental status, thought to be due hepatic encephalopathy; her ammonia now nl and she seems better today; she is now talking  Hospital Course:  Adm for above. Also mention of urethrovaginal fistula. Has developed gradual getachew. Had hypercalcemia apparently due to hyperpara. Apparently was sharp mentally prior to present illness. Being treated for pneumonia, I am not sure of this dx    She is alert but some speech this today. Her ammonia was normal 9.24. She has a tremor bilat. Her CT showed no acute process. eeg encephalopathic  Subjective: \"I want to go home. \"      Medications:  Reviewed    Infusion Medications    sodium bicarbonate infusion       Scheduled Medications    potassium replacement protocol   Other RX Placeholder    cefTRIAXone (ROCEPHIN) IV  1 g Intravenous Q24H    heparin (porcine)  5,000 Units Subcutaneous 3 times per day    vitamin D  5,000 Units Oral Daily    rifaximin  550 mg Oral BID    levothyroxine  112 mcg Oral Daily    ursodiol  300 mg Oral BID    cinacalcet  30 mg Oral Daily    famotidine  20 mg Oral Daily    amLODIPine  10 mg Oral Daily    nicotine  1 patch Transdermal Daily    phosphorus replacement protocol   Other RX Placeholder    lactulose  20 g Oral TID    sodium chloride flush  10 mL Intravenous 2 times per day     PRN Meds: melatonin, haloperidol lactate, acetaminophen, magnesium hydroxide, ondansetron, potassium chloride, magnesium sulfate, labetalol, sodium chloride flush      Intake/Output Summary (Last 24 hours) at 09/26/17 1434  Last data filed at 09/26/17 0653   Gross per 24 hour   Intake             1310 ml   Output             1100 ml   Net              210 ml       Diet:  DIET CARDIAC;   Dietary Nutrition Supplements: Low Calorie High Protein Supplement    Exam:  BP (!) 142/66  Pulse 63  Temp 96.2 °F (35.7 °C) (Oral)   Resp 16  Ht 5' 1\" (1.549 m)  Wt 164 lb 9.3 oz (74.7 kg)  SpO2 93%  BMI 31.1 kg/m2    General appearance:Chronically ill appearing  Keeps eyes closed most of visit; mumbles  HEENT: Pupils equal, round, and reactive to light. Conjunctivae/corneas clear. Neck: Supple, with full range of motion. No jugular venous distention. Trachea midline. Respiratory:   Diminished br sds  Cardiovascular: Regular rate and rhythm with normal S1/S2 without murmurs, rubs or gallops. Abdomen: Soft, non-tender, non-distended with normal bowel sounds. Ventral hernia  Musculoskeletal: No clubbing, cyanosis or edema bilaterally. Full range of motion without deformity. Skin: pale, appears dry  Neurologic:   Alert talks some; improved over 9.25  Psychiatric: above        Labs:   Recent Labs      09/24/17   0650   WBC  10.5   HGB  9.8*   HCT  30.0*   PLT  82*     Recent Labs      09/24/17   0555  09/25/17   0716  09/26/17   0937   NA  146*  144  149*   K  3.7  3.1*  3.3*   CL  110  110  111   CO2  16*  19*  20*   BUN  77*  79*  75*   CREATININE  3.0*  2.9*  2.7*   CALCIUM  10.2  9.5  9.4   PHOS  5.0*   --   4.2     Recent Labs      09/24/17   0555   AST  21   ALT  15   BILITOT  1.5*   ALKPHOS  173*     No results for input(s): INR in the last 72 hours. No results for input(s): Perlie Aiden in the last 72 hours. Urinalysis:      Lab Results   Component Value Date    NITRU NEGATIVE 09/21/2017    WBCUA 10-15 09/21/2017    BACTERIA NONE 09/21/2017    RBCUA 15-20 09/21/2017    BLOODU NEGATIVE 09/21/2017    SPECGRAV 1.016 09/15/2017    GLUCOSEU NEGATIVE 09/21/2017       Radiology:  CT Head WO Contrast   Final Result   1. No acute intracranial hemorrhage or mass effect. 2. Moderate chronic small vessel ischemic changes. **This report has been created using voice recognition software.   It may contain recannulated emboli: Vein. 2. Marked periportal pericaval adenopathy. Follow-up with CT scan is recommended. 3. Gallbladder sludge with gallbladder wall thickening and pericholecystic edema. Acalculous cholecystitis is a possibility. **This report has been created using voice recognition software. It may contain minor errors which are inherent in voice recognition technology. **      Final report electronically signed by Dr. Darrius Henderson on 9/15/2017 7:23 PM      US GALLBLADDER RUQ   Final Result   1. Cirrhotic liver with possible recannulated emboli: Vein. 2. Marked periportal pericaval adenopathy. Follow-up with CT scan is recommended. 3. Gallbladder sludge with gallbladder wall thickening and pericholecystic edema. Acalculous cholecystitis is a possibility. **This report has been created using voice recognition software. It may contain minor errors which are inherent in voice recognition technology. **      Final report electronically signed by Dr. Darrius Henderson on 9/15/2017 7:23 PM      XR Chest Portable   Final Result   NG tube is well into the stomach            **This report has been created using voice recognition software. It may contain minor errors which are inherent in voice recognition technology. **      Final report electronically signed by Dr. Darrius Henderson on 9/15/2017 7:10 PM      XR Chest Portable   Final Result   NG tube in the distal esophagus            **This report has been created using voice recognition software. It may contain minor errors which are inherent in voice recognition technology. **      Final report electronically signed by Dr. Darrius Henderson on 9/15/2017 6:10 PM      US ABDOMINAL LIMITED   Final Result      Trace amount of fluid within the abdomen. Insufficient quantity of ascites to safely perform an ultrasound-guided paracentesis. **This report has been created using voice recognition software.  It may contain minor errors which are

## 2017-09-26 NOTE — PROGRESS NOTES
[address] [id number]  PHYSICAL THERAPY MISSED TREATMENT NOTE  ACUTE CARE  [facility] 5K          pt at thyroid scan at time of attempt     Missed Treatment  Clara Orf PTA [id number]

## 2017-09-26 NOTE — PROGRESS NOTES
Renal Progress Note    Assessment and Plan: 1. Acute kidney injury from dehydration and volume sequestration  2. Hypertension  3. Hepatic cirrhosis   4. Deconditioning   5. Hypokalemia   6. Metabolic acidosis   7. Anemia   8. Thrombocytopenia likely from liver cirrhosis   PLAN:  Labs pending this am  Reviewed medications   Labs tomorrow   Continue sodium bicarbonate drip for now   Will follow     Patient Active Problem List:     GIST (gastrointestinal stromal tumor), non-malignant     Essential hypertension     Cirrhosis of liver with ascites (HCC)     Cancer (HCC)     Hypothyroidism     Dizzy spells     Fever     Epigastric pain     History of atrial fibrillation     Primary biliary cirrhosis (HCC)     Hypertensive urgency     Urethrovaginal fistula     Pleural effusion     Acute hepatic encephalopathy     Thrombocytopenia (HCC)     Microcytic anemia     Hyperbilirubinemia     Hypokalemia     Urinary retention     Hypercalcemia, not POA     Acute renal failure, not POA     Pneumonia, organism unspecified     BEN (acute kidney injury) ([address]     Dehydration     Acquired hypothyroidism     Hypovitaminosis D     Hepatic encephalopathy (HCC)     Hypothermia, not POA     Sludge in gallbladder     Hyperparathyroidism ([facility] [address]     Bilateral leg edema     Metabolic acidosis     Severe malnutrition (HCC)      Subjective:   Admit Date: 9/15/2017    Interval History:   Seeing for acute kidney injury   Very sleepy this am  Updated by the staff  Blood pressure ok  Good urine output       Medications:   Scheduled Meds:   potassium replacement protocol   Other RX Placeholder    cefTRIAXone (ROCEPHIN) IV  1 g Intravenous Q24H    heparin (porcine)  5,000 Units Subcutaneous 3 times per day    vitamin D  5,000 Units Oral Daily    rifaximin  550 mg Oral BID    levothyroxine  112 mcg Oral Daily    ursodiol  300 mg Oral BID    cinacalcet  30 mg Oral Daily    famotidine  20 mg Oral Daily    amLODIPine  10 mg Oral Daily    nicotine  1 patch Transdermal Daily    phosphorus replacement protocol   Other RX Placeholder    lactulose  20 g Oral TID    sodium chloride flush  10 mL Intravenous 2 times per day     Continuous Infusions:   sodium bicarbonate infusion 75 mL/hr at 09/25/17 1100       CBC:   Recent Labs      09/24/17   0650   WBC  10.5   HGB  9.8*   PLT  82*     CMP:  Recent Labs      09/24/17   0555  09/25/17   0716   NA  146*  144   K  3.7  3.1*   CL  110  110   CO2  16*  19*   BUN  77*  79*   CREATININE  3.0*  2.9*   GLUCOSE  94  175*   CALCIUM  10.2  9.5   LABGLOM  15*  16*     Troponin: No results for input(s): TROPONINI in the last 72 hours. BNP: No results for input(s): BNP in the last 72 hours. INR: No results for input(s): INR in the last 72 hours. Lipids: No results for input(s): CHOL, LDLDIRECT, TRIG, HDL, AMYLASE, LIPASE in the last 72 hours. Liver: Recent Labs      09/24/17   0555   AST  21   ALT  15   ALKPHOS  173*   PROT  5.0*   LABALBU  2.0*   BILITOT  1.5*     Iron:  No results for input(s): IRONS, FERRITIN in the last 72 hours. Invalid input(s): LABIRONS    Objective:   Vitals: /60  Pulse 68  Temp 97 °F (36.1 °C) (Axillary)   Resp 16  Ht 5' 1\" (1.549 m)  Wt 164 lb 9.3 oz (74.7 kg)  SpO2 94%  BMI 31.1 kg/m2   Wt Readings from Last 3 Encounters:   09/23/17 164 lb 9.3 oz (74.7 kg)   02/16/17 131 lb (59.4 kg)   12/10/15 122 lb (55.3 kg)      24HR INTAKE/OUTPUT:    Intake/Output Summary (Last 24 hours) at 09/26/17 0726  Last data filed at 09/26/17 0653   Gross per 24 hour   Intake             1310 ml   Output             1100 ml   Net              210 ml       Constitutional:  Sleeping comfortably   Skin:normal   HEENT:Pupils are reactive . Throat is clear   Neck:supple with no bruit  Cardiovascular:  S1, S2 without m/r/g   Respiratory:  Clear anterior   Abdomen: +bs, soft,   Ext: + LE edema  Musculoskeletal:Intact  Neuro:Deferred      Electronically signed by Austin Newman MD on 9/26/2017 at 7:26

## 2017-09-27 LAB
ANION GAP SERPL CALCULATED.3IONS-SCNC: 16 MEQ/L (ref 8–16)
BLOOD CULTURE, ROUTINE: NORMAL
BLOOD CULTURE, ROUTINE: NORMAL
BUN BLDV-MCNC: 71 MG/DL (ref 7–22)
CALCIUM SERPL-MCNC: 9.4 MG/DL (ref 8.5–10.5)
CHLORIDE BLD-SCNC: 108 MEQ/L (ref 98–111)
CO2: 21 MEQ/L (ref 23–33)
CREAT SERPL-MCNC: 2.3 MG/DL (ref 0.4–1.2)
GFR SERPL CREATININE-BSD FRML MDRD: 21 ML/MIN/1.73M2
GLUCOSE BLD-MCNC: 114 MG/DL (ref 70–108)
MAGNESIUM: 1.9 MG/DL (ref 1.6–2.4)
POTASSIUM SERPL-SCNC: 3.1 MEQ/L (ref 3.5–5.2)
SODIUM BLD-SCNC: 145 MEQ/L (ref 135–145)

## 2017-09-27 PROCEDURE — 92526 ORAL FUNCTION THERAPY: CPT

## 2017-09-27 PROCEDURE — 2580000003 HC RX 258: Performed by: INTERNAL MEDICINE

## 2017-09-27 PROCEDURE — 6370000000 HC RX 637 (ALT 250 FOR IP): Performed by: NURSE PRACTITIONER

## 2017-09-27 PROCEDURE — 1200000000 HC SEMI PRIVATE

## 2017-09-27 PROCEDURE — 99233 SBSQ HOSP IP/OBS HIGH 50: CPT | Performed by: FAMILY MEDICINE

## 2017-09-27 PROCEDURE — 99231 SBSQ HOSP IP/OBS SF/LOW 25: CPT | Performed by: NURSE PRACTITIONER

## 2017-09-27 PROCEDURE — 99223 1ST HOSP IP/OBS HIGH 75: CPT | Performed by: PSYCHIATRY & NEUROLOGY

## 2017-09-27 PROCEDURE — 6370000000 HC RX 637 (ALT 250 FOR IP): Performed by: INTERNAL MEDICINE

## 2017-09-27 PROCEDURE — 1200000003 HC TELEMETRY R&B

## 2017-09-27 PROCEDURE — 80048 BASIC METABOLIC PNL TOTAL CA: CPT

## 2017-09-27 PROCEDURE — 83735 ASSAY OF MAGNESIUM: CPT

## 2017-09-27 PROCEDURE — 6360000002 HC RX W HCPCS

## 2017-09-27 PROCEDURE — 6360000002 HC RX W HCPCS: Performed by: INTERNAL MEDICINE

## 2017-09-27 PROCEDURE — 2500000003 HC RX 250 WO HCPCS: Performed by: INTERNAL MEDICINE

## 2017-09-27 PROCEDURE — 36415 COLL VENOUS BLD VENIPUNCTURE: CPT

## 2017-09-27 PROCEDURE — 6360000002 HC RX W HCPCS: Performed by: ANESTHESIOLOGY

## 2017-09-27 PROCEDURE — 84132 ASSAY OF SERUM POTASSIUM: CPT

## 2017-09-27 PROCEDURE — 99232 SBSQ HOSP IP/OBS MODERATE 35: CPT | Performed by: NURSE PRACTITIONER

## 2017-09-27 RX ORDER — POTASSIUM CHLORIDE 7.45 MG/ML
10 INJECTION INTRAVENOUS
Status: DISPENSED | OUTPATIENT
Start: 2017-09-27 | End: 2017-09-27

## 2017-09-27 RX ADMIN — CEFTRIAXONE 1 G: 1 INJECTION, POWDER, FOR SOLUTION INTRAMUSCULAR; INTRAVENOUS at 15:12

## 2017-09-27 RX ADMIN — POTASSIUM CHLORIDE 10 MEQ: 7.46 INJECTION, SOLUTION INTRAVENOUS at 18:11

## 2017-09-27 RX ADMIN — POTASSIUM CHLORIDE 10 MEQ: 7.46 INJECTION, SOLUTION INTRAVENOUS at 12:14

## 2017-09-27 RX ADMIN — POTASSIUM CHLORIDE 10 MEQ: 7.46 INJECTION, SOLUTION INTRAVENOUS at 02:12

## 2017-09-27 RX ADMIN — POTASSIUM CHLORIDE 10 MEQ: 7.46 INJECTION, SOLUTION INTRAVENOUS at 17:59

## 2017-09-27 RX ADMIN — LEVOTHYROXINE SODIUM 112 MCG: 112 TABLET ORAL at 09:28

## 2017-09-27 RX ADMIN — HEPARIN SODIUM 5000 UNITS: 5000 INJECTION, SOLUTION INTRAVENOUS; SUBCUTANEOUS at 17:13

## 2017-09-27 RX ADMIN — POTASSIUM CHLORIDE 10 MEQ: 7.46 INJECTION, SOLUTION INTRAVENOUS at 00:12

## 2017-09-27 RX ADMIN — SODIUM BICARBONATE: 84 INJECTION, SOLUTION INTRAVENOUS at 15:57

## 2017-09-27 RX ADMIN — HEPARIN SODIUM 5000 UNITS: 5000 INJECTION, SOLUTION INTRAVENOUS; SUBCUTANEOUS at 10:21

## 2017-09-27 RX ADMIN — AMLODIPINE BESYLATE 10 MG: 10 TABLET ORAL at 09:28

## 2017-09-27 RX ADMIN — POTASSIUM CHLORIDE 10 MEQ: 7.46 INJECTION, SOLUTION INTRAVENOUS at 20:17

## 2017-09-27 RX ADMIN — CINACALCET HYDROCHLORIDE 30 MG: 30 TABLET, COATED ORAL at 09:28

## 2017-09-27 RX ADMIN — POTASSIUM CHLORIDE 10 MEQ: 7.46 INJECTION, SOLUTION INTRAVENOUS at 15:58

## 2017-09-27 RX ADMIN — HEPARIN SODIUM 5000 UNITS: 5000 INJECTION, SOLUTION INTRAVENOUS; SUBCUTANEOUS at 00:16

## 2017-09-27 RX ADMIN — FAMOTIDINE 20 MG: 20 TABLET, FILM COATED ORAL at 09:28

## 2017-09-27 RX ADMIN — URSODIOL 300 MG: 300 CAPSULE ORAL at 09:28

## 2017-09-27 ASSESSMENT — PAIN SCALES - GENERAL
PAINLEVEL_OUTOF10: 0
PAINLEVEL_OUTOF10: 0

## 2017-09-27 NOTE — PROGRESS NOTES
 heparin (porcine)  5,000 Units Subcutaneous 3 times per day    vitamin D  5,000 Units Oral Daily    rifaximin  550 mg Oral BID    levothyroxine  112 mcg Oral Daily    ursodiol  300 mg Oral BID    cinacalcet  30 mg Oral Daily    famotidine  20 mg Oral Daily    amLODIPine  10 mg Oral Daily    nicotine  1 patch Transdermal Daily    phosphorus replacement protocol   Other RX Placeholder    lactulose  20 g Oral TID    sodium chloride flush  10 mL Intravenous 2 times per day     Continuous Infusions:   sodium bicarbonate infusion 75 mL/hr at 09/26/17 1557       Labs:     Recent Labs      09/25/17   0716  09/26/17   0937  09/27/17   0502   NA  144  149*  145   K  3.1*  3.3*  3.1*   CL  110  111  108   CO2  19*  20*  21*   BUN  79*  75*  71*   CREATININE  2.9*  2.7*  2.3*   LABGLOM  16*  17*  21*   GLUCOSE  175*  151*  114*   MG   --   1.9  1.9   CALCIUM  9.5  9.4  9.4     Renal US  1. Echogenic right renal cortex, possibly secondary to medical renal disease, but otherwise unremarkable renal ultrasound. 2. Aguilar catheter in a nondistended urinary bladder. NM PARATHYROID PLANAR W/SPECT & CT  Poor washout of radiotracer from the thyroid gland without evidence of parathyroid adenoma. EEG  IMPRESSION:  This is a mildly abnormal EEG due to the excessive  slowing seen in the theta range suggestive of cortical dysfunction such  as seen with encephalopathies. However, there was no evidence of  epileptiform activity appreciated. ASSESSMENT:  1. Acute Kidney Injury likely 2nd to dehydration and volume sequestration, Creatinine Improving from high of 3.0 to 2. 3. Na 145 (149). Noted wt gain, lungs clear, BP ok, Currently NPO, PO meds held due to dysphagia. Rhonchi noted but lungs clear. Staff to increase activity and pulm hygiene. Continue IV hydration  2. Chronic Kidney Disease Stage III, baseline 1.1 prior to admission  3. Hypokalemia, Replaced with 40 meq IVPB today  4.  Essential Hypertension  5. Dysphagia, Agree with repeat swallow eval. Pt not getting any PO meds including amlodipine, sensipar  6. Metabolic acidosis on bicarb drip 50 meq in D5W,   7. Hypernatremia, improved, Ok to continue current bicarb concentration  8. Hypercalcemia, No evidence of parathyroid adenoma per NM scan. , Kappa/Lambda 2.09. Resume sensipar when able to take PO meds  9. Altered mental status with Hepatic cirrhosis  10.  Urinary tract infection candida    Meds and Labs reviewed  Principal Problem:    Acute hepatic encephalopathy  Active Problems:    Essential hypertension    Cirrhosis of liver with ascites (HCC)    Hypothyroidism    History of atrial fibrillation    Primary biliary cirrhosis (HCC)    Hypertensive urgency    Urethrovaginal fistula    Pleural effusion    Thrombocytopenia (HCC)    Microcytic anemia    Hyperbilirubinemia    Hypokalemia    Urinary retention    Hypercalcemia, not POA    Acute renal failure with tubular necrosis (HCC)    Pneumonia, organism unspecified    BEN (acute kidney injury) ([address]    Dehydration    Acquired hypothyroidism    Hypovitaminosis D    Hepatic encephalopathy (HCC)    Hypothermia, not POA    Sludge in gallbladder    Hyperparathyroidism ([address]    Bilateral leg edema    Metabolic acidosis    Severe malnutrition ([address]       Discussed with Dr. Galindo Braga, CNP 9/27/2017 7:58 AM

## 2017-09-27 NOTE — PROGRESS NOTES
Hospitalist Progress Note      Patient:  Yanira Zepeda      Unit/Bed:5K-04/004-A    YOB: 1940    MRN: [id number]       Acct: [de-identified]     PCP: India Bauer    Date of Admission: 9/15/2017    Assessment/Plan:  1. Acute Hepatic encephalopathy -- POA --  Elevated ammonia on admission and much improved s/p tx -- cont On lactulose and xifaxan  2. Metabolic encephalopathy -- multifactorial during admission -- combo of Hyperammonia anemia, hypercalcemia, possible infectious process   -- however pt with encephalopathy despite normalization of ammonia, lytes and afeb -> neuro consulted and Dr. Nathaniel Mckenzie saw 9/26 -- CT head 9/24 (-) acute;  EEG 9/26 with encephalopathy but no seizures  -- ? MRI as pt has hx AFib  3. Dysphagia -- ST following - ?due to #2 -- CT head 9/24 (-) acute -- ?due to deconditioning -- monitor closely and on dysphagia diet -- check   4. Metabolic acidosis -- worsened 9/24 --> bicarb gtt started 9/24 and stopped 9/28 per Renal as bicarb improved  5. Hypothermia -- 9/21 and ID consulted for concern for sepsis --  ??etiology - ?endo related and received solumedrol 9/20 -- weaned off bear hugger 9/27 am  -- Dr. Bruce Hanks changed atbx to rocephin 9/25 (zosyn 9/18 - 9/25)  6. Bradycardia -- 9/21 -- resolved -- ?resume BB with frequent PACs and ? PAF  7. Hx of PBC/acutely decompensated cirrhosis -- POA -- s/p JAQUAN eval during admission and has since signed off --  on ursodiol per hx of PBC. U/s with trace ascites early on in admission. 8. BEN --  not POA. multifactorial -- per renal -- slowly improving and down to 2.3 from 3.0 max on 9/24 (was 1.1 on admission 9/15)  -- ?hepato-renal syndrome -   9. RLL and ? LLL Bacterial pneumonia -- likely POA --  likely due to aspiration CT-chest with findings concerning for aspiration pneumonia, likely POA. -- pulm followed during admission from only 9/18 - 9/19 and signed off.   -- weaned to RA - needs pulm tolieting  -- zosyn 9/18 - to 9/25 and rocephin cont synthroid -_ TFT WNL during admission  24. Hypovitaminosis D -- Vit D level only 10 but per endocrinology no replacement due to hypercalcemia/primary hyperparathyroidism however on vit D supplement per renal  25. Ventral hernia -- has followed with Dr. Juan Traylor in past in 2014 and recommended observation only  26. Severe malnutrition -- dieticians following - ?need NG and TF with swallowing issues  27. Deconditioning -- PT/OT following    Dispo  --9/27 --> apprec consultants - blanche adame off this am and monitor temp -- cont monitor lytes, renal fxn and acidosis - bicarb per renal.  Cont atbx per ID with rocephin. Cont monitor mental status -- monitor swallowing as has very wet cough - repeat CXR in am as ??aspiration. Per staff and family pt is mentating better but is very weak - PT/OT and ?plan for d/c. Chief Complaint: confusion    Hospital Course: Marie Walker is a 68 y.o. female with cirrhosis due to [facility], [facility], esophageal varices, hx GIST with resection, HTN, hypothyroidism, afib admitted with accelerated HTN, AMS, found to have hyperammonemia of 143.  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 -- Mental status has been up and down despite improvement of ammonia levels - neuro was consulted 9/26 - CT head 9/24 (-) for acute issue and moderate SVID. EEG 9/26 with encephalopathy but no seizure. -- has been having swallowing issues  -- developed hypothermia on 9/21 and ID consulted - on zosyn and changed to rocephin 9/25      Subjective:   -- 9/27 --> pt more alert per RN and family.   More oriented and able to have a conversation Rales/Wheezes/Rhonchi. No respiratory distress or accessory muscle use. +upper airway rhonchi  Cardiovascular: Regular rate and rhythm with ectopy, normal S1/S2 without murmurs, rubs or + gallops. Abdomen: Soft, non-tender, non-distended with normal bowel sounds. No rebound or guarding, +large upper abd hernia (in epigastric area)  Musculoskeletal: ++ Pitting edema bilateral lower extremities to the thighs,+ dependent edema in the arms right>  Left. Full range of motion without deformity - But right seems worse than left. No calf tenderness palpation  Skin: Skin color, texture, turgor normal.  No rashes or lesions. Neurologic:  Cranial nerves: II-XII intact, grossly non-focal.  RUE weaker than LUE - ?left foot drop, moves toes both feet but very weak. Psychiatric: Alert and oriented x3, thought content appropriate At this time, Appropriate conversation  Capillary Refill: Brisk,< 3 seconds   Peripheral Pulses: +1 palpable, equal bilaterally       Labs:   No results for input(s): WBC, HGB, HCT, PLT in the last 72 hours. Recent Labs      09/25/17   0716  09/26/17   0937  09/27/17   0502   NA  144  149*  145   K  3.1*  3.3*  3.1*   CL  110  111  108   CO2  19*  20*  21*   BUN  79*  75*  71*   CREATININE  2.9*  2.7*  2.3*   CALCIUM  9.5  9.4  9.4   PHOS   --   4.2   --      No results for input(s): AST, ALT, BILIDIR, BILITOT, ALKPHOS in the last 72 hours. No results for input(s): INR in the last 72 hours. No results for input(s): Deboraha Cheng in the last 72 hours. Urinalysis:    Lab Results   Component Value Date    NITRU NEGATIVE 09/21/2017    WBCUA 10-15 09/21/2017    BACTERIA NONE 09/21/2017    RBCUA 15-20 09/21/2017    BLOODU NEGATIVE 09/21/2017    SPECGRAV 1.016 09/15/2017    GLUCOSEU NEGATIVE 09/21/2017       Radiology:  CT Head WO Contrast   Final Result   1. No acute intracranial hemorrhage or mass effect. 2. Moderate chronic small vessel ischemic changes.             **This report has been Final Result   1. Cirrhotic liver with possible recannulated emboli: Vein. 2. Marked periportal pericaval adenopathy. Follow-up with CT scan is recommended. 3. Gallbladder sludge with gallbladder wall thickening and pericholecystic edema. Acalculous cholecystitis is a possibility. **This report has been created using voice recognition software. It may contain minor errors which are inherent in voice recognition technology. **      Final report electronically signed by Dr. Ct Chang on 9/15/2017 7:23 PM      US GALLBLADDER RUQ   Final Result   1. Cirrhotic liver with possible recannulated emboli: Vein. 2. Marked periportal pericaval adenopathy. Follow-up with CT scan is recommended. 3. Gallbladder sludge with gallbladder wall thickening and pericholecystic edema. Acalculous cholecystitis is a possibility. **This report has been created using voice recognition software. It may contain minor errors which are inherent in voice recognition technology. **      Final report electronically signed by Dr. Ct Chang on 9/15/2017 7:23 PM      XR Chest Portable   Final Result   NG tube is well into the stomach            **This report has been created using voice recognition software. It may contain minor errors which are inherent in voice recognition technology. **      Final report electronically signed by Dr. Ct Chang on 9/15/2017 7:10 PM      XR Chest Portable   Final Result   NG tube in the distal esophagus            **This report has been created using voice recognition software. It may contain minor errors which are inherent in voice recognition technology. **      Final report electronically signed by Dr. Ct Chang on 9/15/2017 6:10 PM      US ABDOMINAL LIMITED   Final Result      Trace amount of fluid within the abdomen. Insufficient quantity of ascites to safely perform an ultrasound-guided paracentesis.                   **This report has been created using voice recognition software. It may contain minor errors which are inherent in voice recognition technology. **      Final report electronically signed by Dr. Jennifer Bautista on 9/15/2017 5:13 PM      XR Chest Portable   Final Result   1. Findings consistent with small to moderate size right pleural effusion with underlying atelectasis. Also in the differential would be early infiltrate but this is less favored. Recommend a true PA and lateral chest x-ray preferably in the x-ray    department if patient can tolerate. **This report has been created using voice recognition software. It may contain minor errors which are inherent in voice recognition technology. **      Final report electronically signed by Dr. Tori Cotto on 9/15/2017 9:14 AM      XR Chest Portable    (Results Pending)       Diet: Dietary Nutrition Supplements: Low Calorie High Protein Supplement  DIET DYSPHAGIA I PUREED; Aguilar: Yes    Microbiology:  Blood cultures 9/15/17 = negative    Blood cultures 9/21/17 = negative    Urine culture 9/21/17 = candida glabrata    Tele:  None    DVT prophylaxis: [] Lovenox                                 [] SCDs                                 [x] SQ Heparin                                 [] Encourage ambulation           [] Already on Anticoagulation     Disposition:    [] Home       [] TCU       [] Rehab       [] Psych       [x] SNF       [] [place]       [] Other-    Code Status: Limited    PT/OT Eval Status:  Following        Electronically signed by Tessa Read MD on 9/27/2017 at 3:44 PM when pt evaluated - final note filed late

## 2017-09-27 NOTE — PROGRESS NOTES
[address] [place] [id number]  INPATIENT SPEECH THERAPY  [facility] 5K    TIME   SLP Individual Minutes  Time In: 6241  Time Out: 1243  Minutes: 13          [x]Daily Note  []Progress Note  []Discharge Note    Date: 2017  Patient Name: Ross Lance        MRN: [id number]    : 1940  (68 y.o.)  Gender: female   Primary Provider: Nimo Moon MD  Admitting Diagnosis:  Hypertensive urgency   Secondary Diagnosis: Dysphagia, Cognitive deficits   Precautions: Aspiration  Swallowing Status/Diet: Dysphagia II with thin-  Swallowing Strategies: Upright position  DATE of last MBS:  N/a     Subjective: Pt seen at bedside. Fatigued at baseline with minimal oral intake    SHORT TERM GOAL #1:  Goal 1: Pt will tolerate regular diet with thin liquids without s/s of aspiration in order to safely maintain adequate hydration and nutrition. INTERVENTIONS: Diet downgrade  to Dysphagia II and thin liquids. RN reports ongoing difficulty with softer solids including prolonged mastication of scrambled eggs this morning. RN also reports increased chest congestion with concern for aspiration due to fatigue. Lunch tray in room. Trial 1 tsp mac and cheese with significantly prolonged mastication and mild diffuse oral residue once swallow finally initiated. No overt aspiration of thin liquids by cup. Although no overt aspiration of soft solids, pt is likely unable to maintain adequate nutrition with the above noted oral impairments. Recommend downgrade to puree and thin liquids to improve pt's meal endurance to establish adequate oral intake. No difficulty noted with trial pf pudding 5x. SHORT TERM GOAL #2:  Goal 2: Complete pulmonary monitoring, if pulmonary status declines recommend instrumental evaluation to r/o aspiratino. INTERVENTIONS: Increase in chest congestion reported.   Will monotor for need for MBS    SHORT TERM GOAL #3:  Goal 3: Pt will follow 1 step commands and answer basic yes/no questions with 70% accuracy to improve receptive language skills  INTERVENTIONS: DNT due to focus on diet downgrade    SHORT TERM GOAL #4:  Goal 4: Pt will complete automatic speech tasks and basic naming tasks with 70% accuracy to improve verbal expression  INTERVENTIONS:DNT due to focus on diet downgrade    SHORT TERM GOAL #5:  Goal 5: Complete further cognitive assessment as attention and basic communication skills improve  INTERVENTIONS:DNT due to focus on diet downgrade         ASSESSMENT:  Assessment: [x]Progressing towards goals          []Not Progressing towards goals       Patient Tolerance of Treatment:   [x]Tolerated well []Tolerated fair []Required rest breaks []Fatigued  Plan for Next Session:  Diet monitor   Education:  Learner:  [x]Patient          []Significant Other          []Other  Education provided regarding:  [x]Goals and POC   [x]Diet and swallowing precautions    []Home Exercise Program  [x]Progress and/or discharge information  Method of Education:  [x]Discussion          [x]Demonstration          []Handout          []Other  Evaluation of Education:   [x]Verbalized understanding   []Demonstrates without assistance  [x]Demonstrates with assistance  [x]Needs further instruction     []No evidence of learning                  [x]No family present      Plan: [x]Continue with current plan of care    []Modify current plan of care as follows:    []Discharge patient    Discharge Location:    Services/Supervision Recommended:     [x]Patient continues to require treatment by a licensed therapist to address functional deficits as outlined in the established plan of care.     Mayda [id number]

## 2017-09-27 NOTE — PROGRESS NOTES
Endocrinology Progress Note    Patient:  Ross Lance      Unit/Bed:5K-04/004-A    YOB: 1940    MRN: [id number]     Acct: [de-identified]     Admit date: 9/15/2017    Patient Seen, Chart, Consults notes, Labs, Radiology studies reviewed. Subjective:   CC: Hypercalcemia    In bed, confused when aroused,  Nursing reports choking with breakfast.     Diet:  Dietary Nutrition Supplements: Low Calorie High Protein Supplement  DIET DYSPHAGIA II MECHANICALLY ALTERED;    Medications:  Scheduled Meds:   potassium chloride  10 mEq Intravenous Q2H    potassium replacement protocol   Other RX Placeholder    cefTRIAXone (ROCEPHIN) IV  1 g Intravenous Q24H    heparin (porcine)  5,000 Units Subcutaneous 3 times per day    vitamin D  5,000 Units Oral Daily    rifaximin  550 mg Oral BID    levothyroxine  112 mcg Oral Daily    ursodiol  300 mg Oral BID    cinacalcet  30 mg Oral Daily    famotidine  20 mg Oral Daily    amLODIPine  10 mg Oral Daily    nicotine  1 patch Transdermal Daily    phosphorus replacement protocol   Other RX Placeholder    lactulose  20 g Oral TID    sodium chloride flush  10 mL Intravenous 2 times per day     Continuous Infusions:   sodium bicarbonate infusion 75 mL/hr at 09/26/17 1557     PRN Meds:melatonin, haloperidol lactate, acetaminophen, magnesium hydroxide, ondansetron, potassium chloride, magnesium sulfate, labetalol, sodium chloride flush    Objective:    CBC:   No results for input(s): WBC, HGB, PLT in the last 72 hours. BMP:    Recent Labs      09/25/17   0716  09/26/17   0937  09/27/17   0502   NA  144  149*  145   K  3.1*  3.3*  3.1*   CL  110  111  108   CO2  19*  20*  21*   BUN  79*  75*  71*   CREATININE  2.9*  2.7*  2.3*   GLUCOSE  175*  151*  114*     Calcium:  Recent Labs      09/27/17   0502   CALCIUM  9.4     Ionized Calcium:No results for input(s): IONCA in the last 72 hours.   Magnesium:  Recent Labs      09/27/17   0502   MG  1.9     Phosphorus:  Recent Labs with gallbladder wall thickening and pericholecystic edema. Acalculous cholecystitis is a possibility. **This report has been created using voice recognition software. It may contain minor errors which are inherent in voice recognition technology. ** Final report electronically signed by Dr. Fauzia Fraser on 9/15/2017 7:23 PM    Us Gallbladder Ruq    Result Date: 9/15/2017  PROCEDURE: US GALLBLADDER RUQ, US LIVER CLINICAL INFORMATION: CIRRHOSIS, HEPATITIS,  . COMPARISON: No prior study. TECHNIQUE: Grayscale and color Doppler ultrasound FINDINGS: Exam is somewhat limited due to patient condition. Liver Liver is somewhat decreased in size. No focal mass or intrahepatic ductal dilatation is seen. There are multiple tortuous vessels at the new hepatis. There is possible recannulated the umbilicus vein. There are multiple hypoechoic masses at the new hepatis indicating periportal pericaval adenopathy. Some of these are as large as 9.7 cm. The gallbladder shows no evidence of stones there is gallbladder wall thickening and pericholecystic edema and sludge within the gallbladder. Common bile duct is not enlarged. 1. Cirrhotic liver with possible recannulated emboli: Vein. 2. Marked periportal pericaval adenopathy. Follow-up with CT scan is recommended. 3. Gallbladder sludge with gallbladder wall thickening and pericholecystic edema. Acalculous cholecystitis is a possibility. **This report has been created using voice recognition software. It may contain minor errors which are inherent in voice recognition technology. ** Final report electronically signed by Dr. Fauzia Fraser on 9/15/2017 7:23 PM    Xr Chest Portable    Result Date: 9/15/2017  PROCEDURE: XR CHEST PORTABLE CLINICAL INFORMATION: NG tube placement,  . COMPARISON: 9/15/2017 at 1740 TECHNIQUE: Portable supine FINDINGS: NG tube is been advanced and is well into the stomach. Distal tip is not included on the image. There are no other changes.      NG tube is symmetrical. No tremor. Psych: Mood and Affect normal.          Assessment / Plan:    1. Hypercalcemia with elevated PTH, suggesting possible primary hyperparathyroidism. However, sestamibi scan did not noted a parathyroid adenoma. Received Calcitonin SQ x 1 and daily Cinacalcet 30 mg starting 9/18 per nephrology. Calcium stable. Normal urinary calcium. Renal US normal. Vitamin D 1.25 dihydroxy 46.4.  2. Metabolic acidosis improved with bicarb. 3. Hypothyrodisim. PO Synthroid. TSH 2.410, FT4 1. 12.  4. Hypovitaminosis D. Vit D 10. Do not recommend replacement due to #1.   5. Cirrhosis with hepatic encephalopathy. GI following. Lactulose. Ammonia level has improved. 6. Encephalopathy, multifactorial with #1/4/5.  7. Right lower lobe pneumonia, Pulmonary/ID following. 8. Right pleural effusion. 9. Acute kidney injury, nephrology following. Dispo: Parathyroid scan negative. Nephrology managing hypercalcemia, will defer management to them. Endocrine will sign off. Call PRN.        Electronically signed by Bryce Ngo CNP on 9/27/2017 at 11:12 AM  Rounding for Dr. Kamilah Alamo, Endocrinologist.

## 2017-09-27 NOTE — PLAN OF CARE
Problem: Nutrition  Goal: Optimal nutrition therapy  Outcome: Ongoing  Pt offered medications, but states she can't swallow. Drank iced tea with no problem. Diet changed to dysphagia 2-thin liquids    Problem: Risk for Impaired Skin Integrity  Goal: Tissue integrity - skin and mucous membranes  Structural intactness and normal physiological function of skin and  mucous membranes. Outcome: Ongoing  Skin remains clean ,dry and intact. Turn q 2 hours and as needed. Problem: Discharge Planning:  Goal: Participates in care planning  Participates in care planning   Outcome: Ongoing  Family plans to have pt possible discharge to [facility]. Problem: Airway Clearance - Ineffective:  Goal: Ability to maintain a clear airway will improve  Ability to maintain a clear airway will improve   Outcome: Ongoing  Enc pt to cough and deep breath. Lungs with rhonchi. Breathing treatments ordered. Problem: Anxiety/Stress:  Goal: Level of anxiety will decrease  Level of anxiety will decrease   Outcome: Ongoing  Is not exhibiting stress or anxiety at this time will continue to monitor. Problem: Aspiration:  Goal: Absence of aspiration  Absence of aspiration   Outcome: Ongoing  Pt to be up 90 degrees when drinking. Enc pt to take small sips. No signs or symptoms of aspiration    Problem: Mental Status - Impaired:  Goal: Mental status will be restored to baseline  Mental status will be restored to baseline   Outcome: Ongoing  Pt alert and oriented x 2 to name and place. States \"I want to go home. \"    Comments:   Care plan reviewed with patient. Patient verbalize understanding of the plan of care and contribute to goal setting.

## 2017-09-27 NOTE — PROGRESS NOTES
Noted to have rhonchi this am and more coughing. Not any worse when eating. I fed her. She took two bites of eggs and she chewed them for a long time and then swallowed. I gave her small sips of juice and she is able to swallow this with about every third sip she would cough and I was able to instruct her to do the chin tuck and take a second swallow to clear. She only took about two thirds of a small juice and a few bites of eggs. I did use about 1/3 of a pudding to get her to swallow some of her medications and she did well with occasional second swallows to clear. She continues to have rhonchi. She was unable to take all of her medications because she became fatigued and stated, \" No More. \"

## 2017-09-28 ENCOUNTER — APPOINTMENT (OUTPATIENT)
Dept: MRI IMAGING | Age: 77
DRG: 441 | End: 2017-09-28
Attending: ANESTHESIOLOGY
Payer: MEDICARE

## 2017-09-28 ENCOUNTER — APPOINTMENT (OUTPATIENT)
Dept: GENERAL RADIOLOGY | Age: 77
DRG: 441 | End: 2017-09-28
Attending: ANESTHESIOLOGY
Payer: MEDICARE

## 2017-09-28 ENCOUNTER — APPOINTMENT (OUTPATIENT)
Dept: INTERVENTIONAL RADIOLOGY/VASCULAR | Age: 77
DRG: 441 | End: 2017-09-28
Attending: ANESTHESIOLOGY
Payer: MEDICARE

## 2017-09-28 LAB
AMMONIA: 53 UMOL/L (ref 11–60)
ANION GAP SERPL CALCULATED.3IONS-SCNC: 13 MEQ/L (ref 8–16)
ANISOCYTOSIS: ABNORMAL
BANDED NEUTROPHILS ABSOLUTE COUNT: 0.6 THOU/MM3
BANDS PRESENT: 5 %
BASOPHILS # BLD: 0 %
BASOPHILS ABSOLUTE: 0 THOU/MM3 (ref 0–0.1)
BUN BLDV-MCNC: 68 MG/DL (ref 7–22)
CALCIUM IONIZED: 1.21 MMOL/L (ref 1.12–1.32)
CALCIUM SERPL-MCNC: 9.3 MG/DL (ref 8.5–10.5)
CHLORIDE BLD-SCNC: 106 MEQ/L (ref 98–111)
CO2: 24 MEQ/L (ref 23–33)
CREAT SERPL-MCNC: 1.9 MG/DL (ref 0.4–1.2)
CRENATED RBC'S: ABNORMAL
DIFFERENTIAL, MANUAL: NORMAL
EKG ATRIAL RATE: 147 BPM
EKG P AXIS: 54 DEGREES
EKG P-R INTERVAL: 126 MS
EKG Q-T INTERVAL: 360 MS
EKG QRS DURATION: 90 MS
EKG QTC CALCULATION (BAZETT): 452 MS
EKG R AXIS: -16 DEGREES
EKG T AXIS: 116 DEGREES
EKG VENTRICULAR RATE: 95 BPM
EOSINOPHIL # BLD: 0 %
EOSINOPHILS ABSOLUTE: 0 THOU/MM3 (ref 0–0.4)
GFR SERPL CREATININE-BSD FRML MDRD: 26 ML/MIN/1.73M2
GLUCOSE BLD-MCNC: 153 MG/DL (ref 70–108)
HCT VFR BLD CALC: 32.7 % (ref 37–47)
HEMOGLOBIN: 10.7 GM/DL (ref 12–16)
HYPOCHROMIA: ABNORMAL
LYMPHOCYTES # BLD: 7 %
LYMPHOCYTES ABSOLUTE: 0.9 THOU/MM3 (ref 1–4.8)
MAGNESIUM: 1.7 MG/DL (ref 1.6–2.4)
MCH RBC QN AUTO: 25.3 PG (ref 27–31)
MCHC RBC AUTO-ENTMCNC: 32.8 GM/DL (ref 33–37)
MCV RBC AUTO: 77 FL (ref 81–99)
METAMYELOCYTES: 1 %
MICROCYTES: ABNORMAL
MONOCYTES # BLD: 13 %
MONOCYTES ABSOLUTE: 1.6 THOU/MM3 (ref 0.4–1.3)
NUCLEATED RED BLOOD CELLS: 0 /100 WBC
PDW BLD-RTO: 22.1 % (ref 11.5–14.5)
PLATELET # BLD: 53 THOU/MM3 (ref 130–400)
PLATELET ESTIMATE: ABNORMAL
PMV BLD AUTO: 8.8 MCM (ref 7.4–10.4)
POIKILOCYTES: ABNORMAL
POTASSIUM SERPL-SCNC: 3.2 MEQ/L (ref 3.5–5.2)
POTASSIUM SERPL-SCNC: 3.4 MEQ/L (ref 3.5–5.2)
RBC # BLD: 4.24 MILL/MM3 (ref 4.2–5.4)
RBC # BLD: ABNORMAL 10*6/UL
ROULEAUX: SLIGHT
SEG NEUTROPHILS: 74 %
SEGMENTED NEUTROPHILS ABSOLUTE COUNT: 9 THOU/MM3 (ref 1.8–7.7)
SODIUM BLD-SCNC: 143 MEQ/L (ref 135–145)
WBC # BLD: 12.2 THOU/MM3 (ref 4.8–10.8)

## 2017-09-28 PROCEDURE — 80048 BASIC METABOLIC PNL TOTAL CA: CPT

## 2017-09-28 PROCEDURE — 2580000003 HC RX 258: Performed by: INTERNAL MEDICINE

## 2017-09-28 PROCEDURE — 83735 ASSAY OF MAGNESIUM: CPT

## 2017-09-28 PROCEDURE — 82140 ASSAY OF AMMONIA: CPT

## 2017-09-28 PROCEDURE — 93880 EXTRACRANIAL BILAT STUDY: CPT

## 2017-09-28 PROCEDURE — 2500000003 HC RX 250 WO HCPCS: Performed by: INTERNAL MEDICINE

## 2017-09-28 PROCEDURE — 6370000000 HC RX 637 (ALT 250 FOR IP): Performed by: INTERNAL MEDICINE

## 2017-09-28 PROCEDURE — 6370000000 HC RX 637 (ALT 250 FOR IP): Performed by: NURSE PRACTITIONER

## 2017-09-28 PROCEDURE — 99232 SBSQ HOSP IP/OBS MODERATE 35: CPT | Performed by: INTERNAL MEDICINE

## 2017-09-28 PROCEDURE — 36415 COLL VENOUS BLD VENIPUNCTURE: CPT

## 2017-09-28 PROCEDURE — 1200000003 HC TELEMETRY R&B

## 2017-09-28 PROCEDURE — 2580000003 HC RX 258: Performed by: NURSE PRACTITIONER

## 2017-09-28 PROCEDURE — 85025 COMPLETE CBC W/AUTO DIFF WBC: CPT

## 2017-09-28 PROCEDURE — 92526 ORAL FUNCTION THERAPY: CPT

## 2017-09-28 PROCEDURE — 71010 XR CHEST PORTABLE: CPT

## 2017-09-28 PROCEDURE — 99233 SBSQ HOSP IP/OBS HIGH 50: CPT | Performed by: FAMILY MEDICINE

## 2017-09-28 PROCEDURE — 70551 MRI BRAIN STEM W/O DYE: CPT

## 2017-09-28 PROCEDURE — 82330 ASSAY OF CALCIUM: CPT

## 2017-09-28 PROCEDURE — 93005 ELECTROCARDIOGRAM TRACING: CPT | Performed by: FAMILY MEDICINE

## 2017-09-28 PROCEDURE — 6360000002 HC RX W HCPCS: Performed by: INTERNAL MEDICINE

## 2017-09-28 RX ORDER — SODIUM CHLORIDE 9 MG/ML
INJECTION, SOLUTION INTRAVENOUS CONTINUOUS
Status: DISCONTINUED | OUTPATIENT
Start: 2017-09-28 | End: 2017-09-30

## 2017-09-28 RX ORDER — POTASSIUM CHLORIDE 20 MEQ/1
40 TABLET, EXTENDED RELEASE ORAL ONCE
Status: COMPLETED | OUTPATIENT
Start: 2017-09-28 | End: 2017-09-28

## 2017-09-28 RX ADMIN — HEPARIN SODIUM 5000 UNITS: 5000 INJECTION, SOLUTION INTRAVENOUS; SUBCUTANEOUS at 08:48

## 2017-09-28 RX ADMIN — Medication 10 ML: at 21:12

## 2017-09-28 RX ADMIN — URSODIOL 300 MG: 300 CAPSULE ORAL at 08:41

## 2017-09-28 RX ADMIN — HEPARIN SODIUM 5000 UNITS: 5000 INJECTION, SOLUTION INTRAVENOUS; SUBCUTANEOUS at 01:30

## 2017-09-28 RX ADMIN — LACTULOSE 20 G: 20 SOLUTION ORAL at 08:44

## 2017-09-28 RX ADMIN — RIFAXIMIN 550 MG: 550 TABLET ORAL at 08:41

## 2017-09-28 RX ADMIN — AMLODIPINE BESYLATE 10 MG: 10 TABLET ORAL at 08:39

## 2017-09-28 RX ADMIN — URSODIOL 300 MG: 300 CAPSULE ORAL at 21:11

## 2017-09-28 RX ADMIN — POTASSIUM CHLORIDE 40 MEQ: 1500 TABLET, EXTENDED RELEASE ORAL at 12:13

## 2017-09-28 RX ADMIN — SODIUM CHLORIDE: 9 INJECTION, SOLUTION INTRAVENOUS at 09:35

## 2017-09-28 RX ADMIN — LEVOTHYROXINE SODIUM 112 MCG: 112 TABLET ORAL at 06:30

## 2017-09-28 RX ADMIN — SODIUM BICARBONATE: 84 INJECTION, SOLUTION INTRAVENOUS at 06:23

## 2017-09-28 RX ADMIN — CEFTRIAXONE 1 G: 1 INJECTION, POWDER, FOR SOLUTION INTRAMUSCULAR; INTRAVENOUS at 12:10

## 2017-09-28 RX ADMIN — CINACALCET HYDROCHLORIDE 30 MG: 30 TABLET, COATED ORAL at 08:40

## 2017-09-28 RX ADMIN — RIFAXIMIN 550 MG: 550 TABLET ORAL at 21:11

## 2017-09-28 RX ADMIN — LACTULOSE 20 G: 20 SOLUTION ORAL at 21:11

## 2017-09-28 RX ADMIN — HEPARIN SODIUM 5000 UNITS: 5000 INJECTION, SOLUTION INTRAVENOUS; SUBCUTANEOUS at 17:03

## 2017-09-28 ASSESSMENT — PAIN SCALES - GENERAL
PAINLEVEL_OUTOF10: 0

## 2017-09-28 NOTE — PROGRESS NOTES
Renal Progress Note    Assessment and Plan: 1. Acute kidney injury improving   2. Hypokalemia   3. Metabolioc acidosis resolved   4. Deconditioning   5. Hepatis cirrhosis   6. Mental confusion much better   7. Thrombocytopenia from liver disease   PLAN:  Reviewed labs   klor con 40 meq po once  Stop sodium bicarbonate drip  0.9 saline 50 ml/hr   Monitor platelet   AM labs   Discussed with patient and staff  Will follow     Patient Active Problem List:     GIST (gastrointestinal stromal tumor), non-malignant     Essential hypertension     Cirrhosis of liver with ascites (HCC)     Cancer (HCC)     Hypothyroidism     Dizzy spells     Fever     Epigastric pain     History of atrial fibrillation     Primary biliary cirrhosis (HCC)     Hypertensive urgency     Urethrovaginal fistula     Pleural effusion     Acute hepatic encephalopathy     Thrombocytopenia (HCC)     Microcytic anemia     Hyperbilirubinemia     Hypokalemia     Urinary retention     Hypercalcemia, not POA     Acute renal failure with tubular necrosis (HCC)     Pneumonia, organism unspecified     BEN (acute kidney injury) ([facility] [address]     Dehydration     Acquired hypothyroidism     Hypovitaminosis D     Hepatic encephalopathy (HCC)     Hypothermia, not POA     Sludge in gallbladder     Hyperparathyroidism ([facility] [address]     Bilateral leg edema     Metabolic acidosis     Severe malnutrition (HCC)      Subjective:   Admit Date: 9/15/2017    Interval History:   Seeing for acute kidney injury   Awake and alert   Doing much mingo  Has no concern  Updated by the staff  Admitted for mental confusion from hepatic encephalopathy with high ammonia level  Stable blood pressure    Urine output incompletely documented       Medications:   Scheduled Meds:   potassium chloride  40 mEq Oral Once    potassium replacement protocol   Other RX Placeholder    cefTRIAXone (ROCEPHIN) IV  1 g Intravenous Q24H    heparin (porcine)  5,000 Units Subcutaneous 3 times per day    vitamin D  5,000 Units awake, no apparent distress   Skin:normal   HEENT:Pupils are reactive . Throat is clear   Neck:supple with no bruit  Cardiovascular:  S1, S2 without m/r/g   Respiratory:  CTA B without w/r/r  Abdomen: +bs, soft, non non tender with large ventral hernia  Ext: + LE edema  Musculoskeletal:Intact  Neuro:Alert and awake with no deficit      Electronically signed by Francis Ignacio MD on 9/28/2017 at 8:59 AM

## 2017-09-28 NOTE — PROGRESS NOTES
Has refused turning and repositioning throughout night, preferring to lie on her back. Heels elevated off of bed. SCD's on. Also refused oral medications last evening.

## 2017-09-28 NOTE — PLAN OF CARE
Problem: Falls - Risk of  Goal: Absence of falls  Outcome: Met This Shift  Fall assessment completed. Bed alarm on. Tele-sitter in room. Problem: Nutrition  Goal: Optimal nutrition therapy  Outcome: Not Met This Shift  Poor intakes. Needs much encouragement to take oral fluids/solids. Problem: Risk for Impaired Skin Integrity  Goal: Tissue integrity - skin and mucous membranes  Structural intactness and normal physiological function of skin and  mucous membranes. Outcome: Ongoing  Coccyx pink and blanchable. Refuses to turn to side lying position in bed. Heels elevated. Problem: Discharge Planning:  Goal: Participates in care planning  Participates in care planning   Outcome: Not Met This Shift  Goal: Discharged to appropriate level of care  Discharged to appropriate level of care   Outcome: Ongoing  Planning for ECF placement at discharge. Problem: Airway Clearance - Ineffective:  Goal: Ability to maintain a clear airway will improve  Ability to maintain a clear airway will improve   Outcome: Not Met This Shift  Lungs with rhonchi throughout. Problem: Anxiety/Stress:  Goal: Level of anxiety will decrease  Level of anxiety will decrease   Outcome: Met This Shift  Rests quietly and is calm. But refuses to turn and reposition. Problem: Aspiration:  Goal: Absence of aspiration  Absence of aspiration   Outcome: Ongoing  Lungs with rhonchi throughout. CXR in am.    Problem: Bowel Function - Altered:  Goal: Bowel elimination is within specified parameters  Bowel elimination is within specified parameters   Outcome: Ongoing  No BM this shift. Problem: Cardiac Output - Decreased:  Goal: Hemodynamic stability will improve  Hemodynamic stability will improve   Outcome: Met This Shift  Vital signs stable.   Labs to be rechecked in am.    Problem: Mental Status - Impaired:  Goal: Mental status will be restored to baseline  Mental status will be restored to baseline   Outcome: Ongoing  Alert and

## 2017-09-28 NOTE — PLAN OF CARE
Problem: Falls - Risk of  Goal: Absence of falls  Outcome: Ongoing  Fall precautions in place. Bed in low position. Call light and side table within reach. No falls noted this shift. Patient with noted confusion on admission. Problem: Nutrition  Goal: Optimal nutrition therapy  Outcome: Ongoing  Patient on dysphagia I pureed diet with nectar thick liquids, no straws. Patient assisted with feeding but states she is not hungry. Problem: Risk for Impaired Skin Integrity  Goal: Tissue integrity - skin and mucous membranes  Structural intactness and normal physiological function of skin and  mucous membranes. Outcome: Ongoing  No new skin issues noted this shift. Problem: Discharge Planning:  Goal: Participates in care planning  Participates in care planning   Outcome: Ongoing  Discharge planning ongoing. Goal: Discharged to appropriate level of care  Discharged to appropriate level of care   Outcome: Ongoing    Problem: Musculor/Skeletal Functional Status  Goal: Highest potential functional level  Outcome: Ongoing  PT / OT working with patient. ADL's completed with assistance of staff. Comments:   Care plan reviewed with patient. Patient verbalized understanding of the plan of care and contribute to goal setting.

## 2017-09-28 NOTE — PROGRESS NOTES
chloride flush  10 mL Intravenous 2 times per day      sodium chloride 50 mL/hr at 09/28/17 0935     melatonin, haloperidol lactate, acetaminophen, magnesium hydroxide, ondansetron, potassium chloride, magnesium sulfate, labetalol, sodium chloride flush      LABS:     CBC:   Recent Labs      09/28/17   0707   WBC  12.2*   HGB  10.7*   PLT  53*     BMP:    Recent Labs      09/26/17   0937  09/27/17   0502  09/27/17   2356  09/28/17   0609   NA  149*  145   --   143   K  3.3*  3.1*  3.4*  3.2*   CL  111  108   --   106   CO2  20*  21*   --   24   BUN  75*  71*   --   68*   CREATININE  2.7*  2.3*   --   1.9*   GLUCOSE  151*  114*   --   153*     Calcium:  Recent Labs      09/28/17   0609   CALCIUM  9.3     Ionized Calcium:No results for input(s): IONCA in the last 72 hours. Magnesium:  Recent Labs      09/28/17   0609   MG  1.7     Phosphorus:  Recent Labs      09/26/17   0937   PHOS  4.2     BNP:No results for input(s): BNP in the last 72 hours. Glucose:No results for input(s): POCGLU in the last 72 hours. HgbA1C: No results for input(s): LABA1C in the last 72 hours. INR: No results for input(s): INR in the last 72 hours. Hepatic:   No results for input(s): ALKPHOS, ALT, AST, PROT, BILITOT, BILIDIR, LABALBU in the last 72 hours. Amylase and Lipase:No results for input(s): LACTA, AMYLASE in the last 72 hours. Lactic Acid: No results for input(s): LACTA in the last 72 hours. Troponin: No results for input(s): CKTOTAL, CKMB, TROPONINI in the last 72 hours. BNP: No results for input(s): BNP in the last 72 hours. CULTURES:   UA:   No results for input(s): SPECGRAV, PHUR, COLORU, CLARITYU, MUCUS, PROTEINU, BLOODU, RBCUA, WBCUA, BACTERIA, NITRU, GLUCOSEU, BILIRUBINUR, UROBILINOGEN, KETUA, LABCAST, LABCASTTY, AMORPHOS in the last 72 hours.     Invalid input(s): CRYSTALS  Micro:   Lab Results   Component Value Date    BC No growth-preliminary  No growth   09/21/2017         IMAGING:         Problem list of patient:

## 2017-09-28 NOTE — PROGRESS NOTES
[address] [place] [address]  INPATIENT SPEECH THERAPY  [facility]    TIME   SLP Individual Minutes  Time In: 9927  Time Out: 0391  Minutes: 10          [x]Daily Note  []Progress Note  []Discharge Note    Date: 2017  Patient Name: Amina Mazariegos        MRN: [id number]    : 1940  (68 y.o.)  Gender: female   Primary Provider: Marco A Parker MD  Admitting Diagnosis:  Hypertensive urgency   Secondary Diagnosis: Dysphagia, Cognitive deficits   Precautions: Aspiration  Swallowing Status/Diet: Dysphagia I with NECTAR thick liquids-  Swallowing Strategies: Upright position  DATE of last MBS:  N/a     Subjective: Pt seen at bedside. Fatigued at baseline with minimal oral intake. Per RN patient with continued decline in pulmonary status. Although RN reports good success with PO medication. SHORT TERM GOAL #1:  Goal 1: Pt will tolerate regular diet with thin liquids without s/s of aspiration in order to safely maintain adequate hydration and nutrition. INTERVENTIONS: Diet downgrade  to Dysphagia II and thin liquids. Diet downgrade  to Dysphagia I with thin liquids. RN reports increased chest congestion with concern for aspiration due to fatigue. Patient complete 1 tsp pudding demonstrating with functional mastication, bolus formation, timely A-P transit, timely swallow, no oral residue. However, patient refused additional PO trials. Provided max encouragement patient completed trial of thin liquids via cup x3, 2/3 patient with immediate cough, no s/s of aspiration 1/3 trials. Patient with continued fatigue despite minimal PO trials. Provided max encouragement patient completed PO trial of nectar thick liquids x5, no s/s of aspiration. Patient refusal additional PO trials reporting, \"I just can't do anymore. \"  Patient unable to identify reason. Recommend downgrade to puree and NECTAR thick liquids. RN and Dr. Michelle Marx notified of second diet downgrade.   Recommend further completion of established plan of care.     DAXA Urbina 23

## 2017-09-28 NOTE — PROGRESS NOTES
Hospitalist Progress Note      Patient:  Yanira Zepeda      Unit/Bed:5K-04/004-A    YOB: 1940    MRN: [id number]       Acct: [de-identified]     PCP: India Bauer    Date of Admission: 9/15/2017    Assessment/Plan:  1. Acute Hepatic encephalopathy -- POA --  Elevated ammonia on admission and much improved s/p tx -- cont On lactulose and xifaxan -- last ammonia 9/28 WNL  2. Metabolic encephalopathy -- multifactorial during admission -- combo of Hyperammonia anemia, hypercalcemia, possible infectious process   -- however pt with encephalopathy despite normalization of ammonia, lytes and afeb -> neuro consulted and Dr. Nathaniel Mckenzie saw 9/26 -- CT head 9/24 (-) acute;  EEG 9/26 with encephalopathy but no seizures  -- check MRI 9/28 as pt not improving and ?etiology and has hx of PAF and unable to anticoagulate due to liver dysfxn  -- ammonia 9/28 WNL  3. Dysphagia -- ST following - ?due to #2 -- CT head 9/24 (-) acute -- ?due to deconditioning -- monitor closely and on dysphagia diet   -- check MRI head 9/28 as per ST her swallowing has continued to decline over last week   -- MBS 9/29 per d/w ST  4. Metabolic acidosis -- worsened 9/24 --> bicarb gtt started 9/24 and stopped 9/28 per Renal as bicarb improved  5. Diffuse edema -- arms and legs --> due to poor nutritional status, low albumin, IVF --> ++fluid balance - ?stop IVF and ?need diuresis - ?wrap legs  6. Hypothermia -- 9/21 and ID consulted for concern for sepsis --  ??etiology - ?endo related and received solumedrol 9/20   -- weaned off bear hugger 9/27 am and temp stable  -- Dr. Bruce Hanks changed atbx to rocephin 9/25 (zosyn 9/18 - 9/25)  -- blood cx 9/21 (-) - urine with candida 9/21 - ?treat  7. Bradycardia -- 9/21 -- resolved -- ?resume BB with frequent PACs and ? PAF  8. Hx of PBC/acutely decompensated cirrhosis -- POA -- s/p JAQUAN eval during admission and has since signed off --  on ursodiol per hx of PBC. U/s with trace ascites early on in admission. family pt is mentating better but is very weak - PT/OT and ?plan for d/c. Chief Complaint: confusion    Hospital Course: Amina Mazariegos is a 68 y.o. female with cirrhosis due to [facility], GAVE, esophageal varices, hx GIST with resection, HTN, hypothyroidism, afib admitted with accelerated HTN, AMS, found to have hyperammonemia of 143.  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 -- Mental status has been up and down despite improvement of ammonia levels - neuro was consulted 9/26 - CT head 9/24 (-) for acute issue and moderate SVID. EEG 9/26 with encephalopathy but no seizure. -- has been having swallowing issues  -- developed hypothermia on 9/21 and ID consulted - on zosyn and changed to rocephin 9/25      Subjective:   -- 9/28 --> pt alert, oriented x 3. Denies any pain. Feels little sob.  +wet cough and cough is very weak. No cp. No n/v.  D/w speech therapist and pt last week was on a general diet and doing ok and they have noticed quite a decline. Pt states she's tired. No stools being recorded. ?? Really +14L since admission. U/o not accurately measured. ??? Weight actually up 28 lbs on 9/23.    -- 9/27 --> pt more alert per RN and family. More oriented and able to have a conversation however speech is quiet and slow. +trouble swallowing and per Rn pt fatigues quickly. Not eating much. No n/v. No abd pain. NO f/c. No cp/sob. Afeb.         Medications:  Reviewed    Infusion Medications    sodium chloride 50 mL/hr at 09/28/17 0935     Scheduled Medications    potassium replacement protocol   Other RX Placeholder    cefTRIAXone (ROCEPHIN) IV  1 g Intravenous Q24H    heparin (porcine)  5,000 Units Subcutaneous 3 times per day    vitamin D  5,000 Units Oral Daily    rifaximin  550 mg Oral BID    levothyroxine  112 mcg Oral Daily    ursodiol  300 mg Oral BID    cinacalcet  30 mg Oral Daily    famotidine  20 mg Oral Daily    amLODIPine  10 mg Oral Daily    nicotine  1 patch Transdermal Daily    phosphorus replacement protocol   Other RX Placeholder    lactulose  20 g Oral TID    sodium chloride flush  10 mL Intravenous 2 times per day     PRN Meds: melatonin, haloperidol lactate, acetaminophen, magnesium hydroxide, ondansetron, potassium chloride, magnesium sulfate, labetalol, sodium chloride flush      Intake/Output Summary (Last 24 hours) at 09/28/17 1315  Last data filed at 09/28/17 9416   Gross per 24 hour   Intake          2479.48 ml   Output              300 ml   Net          2179.48 ml       Diet:  Dietary Nutrition Supplements: Low Calorie High Protein Supplement  DIET DYSPHAGIA I PUREED; Nectar Thick; No Drinking Straw    Exam:  BP (!) 156/70  Pulse 72  Temp 97.7 °F (36.5 °C) (Oral)   Resp 18  Ht 5' 1\" (1.549 m)  Wt 164 lb 9.3 oz (74.7 kg)  SpO2 91%  BMI 31.1 kg/m2    General appearance: No apparent distress, appears stated age and cooperative. Chronically ill appearing. HEENT: Pupils equal, round, and reactive to light. Conjunctivae/corneas clear. Neck: Supple, with full range of motion. No jugular venous distention. Trachea midline. Respiratory:  Normal respiratory effort. Clear to auscultation bilaterally without Rales/Wheezes/Rhonchi. No respiratory distress or accessory muscle use. +upper airway rhonchi  Cardiovascular: Regular rate and rhythm with frequent ectopy, normal S1/S2 without murmurs, rubs or + gallops. Abdomen: Soft, non-tender, non-distended with normal bowel sounds.   No rebound or guarding, +large upper abd hernia (in epigastric area)  Musculoskeletal: ++ Pitting edema bilateral lower extremities to the thighs,+ dependent edema in the arms right>  Left. Full range of motion without deformity - But right seems weaker than left. No calf tenderness palpation  Skin: Skin color, texture, turgor normal.  No rashes or lesions. Neurologic:  Cranial nerves: II-XII intact, grossly non-focal.  RUE weaker than LUE - ?left foot drop, moves toes both feet but very weak but little better 9/28. Little hard to get words out at times. Psychiatric: Alert and oriented x3, thought content appropriate At this time, Appropriate conversation  Capillary Refill: Brisk,< 3 seconds   Peripheral Pulses: +1 palpable, equal bilaterally       Labs:   Recent Labs      09/28/17   0707   WBC  12.2*   HGB  10.7*   HCT  32.7*   PLT  53*     Recent Labs      09/26/17   0937  09/27/17   0502  09/27/17   2356  09/28/17   0609   NA  149*  145   --   143   K  3.3*  3.1*  3.4*  3.2*   CL  111  108   --   106   CO2  20*  21*   --   24   BUN  75*  71*   --   68*   CREATININE  2.7*  2.3*   --   1.9*   CALCIUM  9.4  9.4   --   9.3   PHOS  4.2   --    --    --      No results for input(s): AST, ALT, BILIDIR, BILITOT, ALKPHOS in the last 72 hours. No results for input(s): INR in the last 72 hours. No results for input(s): Lodema  in the last 72 hours. Urinalysis:    Lab Results   Component Value Date    NITRU NEGATIVE 09/21/2017    WBCUA 10-15 09/21/2017    BACTERIA NONE 09/21/2017    RBCUA 15-20 09/21/2017    BLOODU NEGATIVE 09/21/2017    SPECGRAV 1.016 09/15/2017    GLUCOSEU NEGATIVE 09/21/2017       Radiology:  XR Chest Portable   Final Result   1. There is new elevation of the right hemidiaphragm to the level of the right hilum. Underlying pleural fluid and atelectasis/infiltrates cannot be excluded. The right upper lung zones are clear. 2. There is new opacification obscuring the retrocardiac left lung base with the differential including pleural fluid and atelectasis/infiltrates.  The left lung fields are otherwise clear.            **This report has been created using voice recognition software. It may contain minor errors which are inherent in voice recognition technology. **      Final report electronically signed by Dr. Doreen Li on 9/28/2017 8:16 AM      CT Head WO Contrast   Final Result   1. No acute intracranial hemorrhage or mass effect. 2. Moderate chronic small vessel ischemic changes. **This report has been created using voice recognition software. It may contain minor errors which are inherent in voice recognition technology. **      Final report electronically signed by Dr. Chung Osman on 9/24/2017 9:49 AM      US RENAL LIMITED   Final Result   1. Echogenic right renal cortex, possibly secondary to medical renal disease, but otherwise unremarkable renal ultrasound. 2. Aguilar catheter in a nondistended urinary bladder. Final report electronically signed by Dr. Jeremias Alvarenga on 9/22/2017 8:24 AM      NM HEPATOBILIARY   Final Result   Patent cystic and common bile ducts without evidence of acute cholecystitis. Final report electronically signed by Dr. Jeremias Alvarenga on 9/21/2017 4:39 PM      XR Chest Portable   Final Result   1. Mild cardiomegaly Moderate calcification of the mitral annulus. 2. Mild bibasilar atelectasis/pneumonia. Overall appearance of chest slightly worse than prior. **This report has been created using voice recognition software. It may contain minor errors which are inherent in voice recognition technology. **      Final report electronically signed by Dr. Caren Vazquez on 9/21/2017 2:25 PM      XR Chest Standard TWO VW   Final Result   1. Mild cardiomegaly. 2. Small bilateral pleural effusions. 3. Mild bibasilar atelectasis/pneumonia. 4. Overall appearance of chest improved from prior. **This report has been created using voice recognition software. It may contain minor errors which are inherent in voice recognition technology. ** recognition software. It may contain minor errors which are inherent in voice recognition technology. **      Final report electronically signed by Dr. French Benz on 9/15/2017 6:10 PM      US ABDOMINAL LIMITED   Final Result      Trace amount of fluid within the abdomen. Insufficient quantity of ascites to safely perform an ultrasound-guided paracentesis. **This report has been created using voice recognition software. It may contain minor errors which are inherent in voice recognition technology. **      Final report electronically signed by Dr. Doyle Coburn on 9/15/2017 5:13 PM      XR Chest Portable   Final Result   1. Findings consistent with small to moderate size right pleural effusion with underlying atelectasis. Also in the differential would be early infiltrate but this is less favored. Recommend a true PA and lateral chest x-ray preferably in the x-ray    department if patient can tolerate. **This report has been created using voice recognition software. It may contain minor errors which are inherent in voice recognition technology. **      Final report electronically signed by Dr. Sylvia Boyer on 9/15/2017 9:14 AM      MRI BRAIN WO CONTRAST    (Results Pending)   Fluoroscopy modified barium swallow with video    (Results Pending)       Diet: Dietary Nutrition Supplements: Low Calorie High Protein Supplement  DIET DYSPHAGIA I PUREED; Nectar Thick;  No Drinking Straw    Aguilar: yes    Microbiology:  Blood cx 9/15 (-)    Blood cx 9/21 (-)     Urine cx 9/21 = candida glabrata    Tele: none     DVT prophylaxis: [] Lovenox                                 [] SCDs                                 [x] SQ Heparin                                 [] Encourage ambulation           [] Already on Anticoagulation     Disposition:    [] Home       [] TCU       [] Rehab       [] Psych       [x] SNF       [] [place]       [] Other-    Code Status: Limited    PT/OT Eval Status: following      Electronically signed by Alexandria Leal MD on 9/28/2017 at 1:15 PM when pt evaluated - final note filed late

## 2017-09-29 ENCOUNTER — APPOINTMENT (OUTPATIENT)
Dept: GENERAL RADIOLOGY | Age: 77
DRG: 441 | End: 2017-09-29
Attending: ANESTHESIOLOGY
Payer: MEDICARE

## 2017-09-29 LAB
ANION GAP SERPL CALCULATED.3IONS-SCNC: 15 MEQ/L (ref 8–16)
BUN BLDV-MCNC: 66 MG/DL (ref 7–22)
CALCIUM SERPL-MCNC: 9.6 MG/DL (ref 8.5–10.5)
CHLORIDE BLD-SCNC: 106 MEQ/L (ref 98–111)
CO2: 24 MEQ/L (ref 23–33)
CREAT SERPL-MCNC: 1.8 MG/DL (ref 0.4–1.2)
GFR SERPL CREATININE-BSD FRML MDRD: 27 ML/MIN/1.73M2
GLUCOSE BLD-MCNC: 84 MG/DL (ref 70–108)
GLUCOSE BLD-MCNC: 91 MG/DL (ref 70–108)
POTASSIUM SERPL-SCNC: 3.3 MEQ/L (ref 3.5–5.2)
POTASSIUM SERPL-SCNC: 4 MEQ/L (ref 3.5–5.2)
SODIUM BLD-SCNC: 145 MEQ/L (ref 135–145)

## 2017-09-29 PROCEDURE — 92611 MOTION FLUOROSCOPY/SWALLOW: CPT

## 2017-09-29 PROCEDURE — 80048 BASIC METABOLIC PNL TOTAL CA: CPT

## 2017-09-29 PROCEDURE — 74230 X-RAY XM SWLNG FUNCJ C+: CPT

## 2017-09-29 PROCEDURE — 6370000000 HC RX 637 (ALT 250 FOR IP): Performed by: NURSE PRACTITIONER

## 2017-09-29 PROCEDURE — 82948 REAGENT STRIP/BLOOD GLUCOSE: CPT

## 2017-09-29 PROCEDURE — 80185 ASSAY OF PHENYTOIN TOTAL: CPT

## 2017-09-29 PROCEDURE — 2500000003 HC RX 250 WO HCPCS: Performed by: FAMILY MEDICINE

## 2017-09-29 PROCEDURE — 36415 COLL VENOUS BLD VENIPUNCTURE: CPT

## 2017-09-29 PROCEDURE — 6370000000 HC RX 637 (ALT 250 FOR IP): Performed by: INTERNAL MEDICINE

## 2017-09-29 PROCEDURE — 1210000002 HC MED SURG R&B - NEUROSCIENCE

## 2017-09-29 PROCEDURE — 6360000002 HC RX W HCPCS: Performed by: INTERNAL MEDICINE

## 2017-09-29 PROCEDURE — 2580000003 HC RX 258: Performed by: INTERNAL MEDICINE

## 2017-09-29 PROCEDURE — 97530 THERAPEUTIC ACTIVITIES: CPT

## 2017-09-29 PROCEDURE — 80186 ASSAY OF PHENYTOIN FREE: CPT

## 2017-09-29 PROCEDURE — 97110 THERAPEUTIC EXERCISES: CPT

## 2017-09-29 PROCEDURE — 6370000000 HC RX 637 (ALT 250 FOR IP): Performed by: FAMILY MEDICINE

## 2017-09-29 PROCEDURE — 71010 XR CHEST PORTABLE: CPT

## 2017-09-29 PROCEDURE — 6360000002 HC RX W HCPCS: Performed by: ANESTHESIOLOGY

## 2017-09-29 PROCEDURE — 99233 SBSQ HOSP IP/OBS HIGH 50: CPT | Performed by: FAMILY MEDICINE

## 2017-09-29 PROCEDURE — 84132 ASSAY OF SERUM POTASSIUM: CPT

## 2017-09-29 PROCEDURE — 99232 SBSQ HOSP IP/OBS MODERATE 35: CPT | Performed by: NURSE PRACTITIONER

## 2017-09-29 RX ORDER — POTASSIUM CHLORIDE 20 MEQ/1
40 TABLET, EXTENDED RELEASE ORAL ONCE
Status: DISCONTINUED | OUTPATIENT
Start: 2017-09-29 | End: 2017-09-29 | Stop reason: ALTCHOICE

## 2017-09-29 RX ORDER — POTASSIUM CHLORIDE 7.45 MG/ML
10 INJECTION INTRAVENOUS
Status: COMPLETED | OUTPATIENT
Start: 2017-09-29 | End: 2017-09-29

## 2017-09-29 RX ADMIN — AMLODIPINE BESYLATE 10 MG: 10 TABLET ORAL at 08:07

## 2017-09-29 RX ADMIN — HEPARIN SODIUM 5000 UNITS: 5000 INJECTION, SOLUTION INTRAVENOUS; SUBCUTANEOUS at 23:56

## 2017-09-29 RX ADMIN — FAMOTIDINE 20 MG: 20 TABLET, FILM COATED ORAL at 08:07

## 2017-09-29 RX ADMIN — NYSTATIN 500000 UNITS: 100000 SUSPENSION ORAL at 13:24

## 2017-09-29 RX ADMIN — POTASSIUM CHLORIDE 10 MEQ: 7.46 INJECTION, SOLUTION INTRAVENOUS at 15:52

## 2017-09-29 RX ADMIN — BARIUM SULFATE 20 ML: 400 SUSPENSION ORAL at 09:07

## 2017-09-29 RX ADMIN — NYSTATIN 500000 UNITS: 100000 SUSPENSION ORAL at 23:30

## 2017-09-29 RX ADMIN — POTASSIUM CHLORIDE 10 MEQ: 7.46 INJECTION, SOLUTION INTRAVENOUS at 17:02

## 2017-09-29 RX ADMIN — HEPARIN SODIUM 5000 UNITS: 5000 INJECTION, SOLUTION INTRAVENOUS; SUBCUTANEOUS at 16:59

## 2017-09-29 RX ADMIN — HEPARIN SODIUM 5000 UNITS: 5000 INJECTION, SOLUTION INTRAVENOUS; SUBCUTANEOUS at 00:23

## 2017-09-29 RX ADMIN — URSODIOL 300 MG: 300 CAPSULE ORAL at 08:07

## 2017-09-29 RX ADMIN — RIFAXIMIN 550 MG: 550 TABLET ORAL at 08:07

## 2017-09-29 RX ADMIN — URSODIOL 300 MG: 300 CAPSULE ORAL at 23:28

## 2017-09-29 RX ADMIN — SODIUM CHLORIDE: 9 INJECTION, SOLUTION INTRAVENOUS at 05:02

## 2017-09-29 RX ADMIN — RIFAXIMIN 550 MG: 550 TABLET ORAL at 23:28

## 2017-09-29 RX ADMIN — LEVOTHYROXINE SODIUM 112 MCG: 112 TABLET ORAL at 06:58

## 2017-09-29 RX ADMIN — POTASSIUM CHLORIDE 10 MEQ: 7.46 INJECTION, SOLUTION INTRAVENOUS at 13:23

## 2017-09-29 RX ADMIN — POTASSIUM CHLORIDE 10 MEQ: 7.46 INJECTION, SOLUTION INTRAVENOUS at 15:59

## 2017-09-29 RX ADMIN — LACTULOSE 20 G: 20 SOLUTION ORAL at 23:29

## 2017-09-29 RX ADMIN — POTASSIUM CHLORIDE 10 MEQ: 7.46 INJECTION, SOLUTION INTRAVENOUS at 14:26

## 2017-09-29 RX ADMIN — VITAMIN D, TAB 1000IU (100/BT) 5000 UNITS: 25 TAB at 08:07

## 2017-09-29 RX ADMIN — HEPARIN SODIUM 5000 UNITS: 5000 INJECTION, SOLUTION INTRAVENOUS; SUBCUTANEOUS at 08:07

## 2017-09-29 RX ADMIN — LACTULOSE 20 G: 20 SOLUTION ORAL at 08:07

## 2017-09-29 RX ADMIN — BARIUM SULFATE 10 ML: 400 PASTE ORAL at 09:08

## 2017-09-29 RX ADMIN — LACTULOSE 20 G: 20 SOLUTION ORAL at 13:24

## 2017-09-29 RX ADMIN — CEFTRIAXONE 1 G: 1 INJECTION, POWDER, FOR SOLUTION INTRAMUSCULAR; INTRAVENOUS at 10:24

## 2017-09-29 RX ADMIN — CINACALCET HYDROCHLORIDE 30 MG: 30 TABLET, COATED ORAL at 08:07

## 2017-09-29 ASSESSMENT — PAIN SCALES - GENERAL
PAINLEVEL_OUTOF10: 0
PAINLEVEL_OUTOF10: 0

## 2017-09-29 ASSESSMENT — PAIN SCALES - WONG BAKER: WONGBAKER_NUMERICALRESPONSE: 0

## 2017-09-29 NOTE — PROGRESS NOTES
Progress note: Infectious diseases    Patient - Nicolás Whitfield,  Age - 68 y.o.    - 1940      Room Number - 5K-04/004-A   MRN -  [id number]   [facility]  Date of Admission -  9/15/2017  4:22 AM    SUBJECTIVE:   MRI result noted. Has ischemic stroke on both hemispheres  She is more awake and able to give conversation    OBJECTIVE   VITALS    height is 5' 1\" (1.549 m) and weight is 164 lb 9.3 oz (74.7 kg). Her oral temperature is 98.1 °F (36.7 °C). Her blood pressure is 155/65 (abnormal) and her pulse is 94. Her respiration is 18 and oxygen saturation is 91%. Wt Readings from Last 3 Encounters:   17 164 lb 9.3 oz (74.7 kg)   17 131 lb (59.4 kg)   12/10/15 122 lb (55.3 kg)       I/O (24 Hours)    Intake/Output Summary (Last 24 hours) at 17 1647  Last data filed at 17 1429   Gross per 24 hour   Intake          1531.54 ml   Output              700 ml   Net           831.54 ml       General Appearance  More awake,   HEENT - normocephalic, atraumatic, pink conjunctiva,  anicteric sclera  Neck - Supple, no mass  Lungs -  Bilateral  air entry, + rhonchi,   Cardiovascular - Heart sounds are normal.    Abdomen - soft, not distended, nontender, epigastric hernia  Neurologic -opens eyse and tries to answer and falls asleep  Skin - No bruising or bleeding  Extremities -+ edema both feet.     MEDICATIONS:      nystatin  5 mL Oral 4x Daily    potassium replacement protocol   Other RX Placeholder    cefTRIAXone (ROCEPHIN) IV  1 g Intravenous Q24H    heparin (porcine)  5,000 Units Subcutaneous 3 times per day    vitamin D  5,000 Units Oral Daily    rifaximin  550 mg Oral BID    levothyroxine  112 mcg Oral Daily    ursodiol  300 mg Oral BID    cinacalcet  30 mg Oral Daily    famotidine  20 mg Oral Daily    amLODIPine  10 mg Oral Daily    nicotine  1 patch Transdermal Daily    phosphorus replacement protocol   Other RX Placeholder    lactulose  20 g Oral TID    sodium chloride flush  10 mL Intravenous 2 times per day      sodium chloride 50 mL/hr at 09/29/17 0502     melatonin, haloperidol lactate, acetaminophen, magnesium hydroxide, ondansetron, potassium chloride, magnesium sulfate, labetalol, sodium chloride flush      LABS:     CBC:   Recent Labs      09/28/17   0707   WBC  12.2*   HGB  10.7*   PLT  53*     BMP:    Recent Labs      09/27/17   0502  09/27/17   2356  09/28/17   0609  09/29/17   0521   NA  145   --   143  145   K  3.1*  3.4*  3.2*  3.3*   CL  108   --   106  106   CO2  21*   --   24  24   BUN  71*   --   68*  66*   CREATININE  2.3*   --   1.9*  1.8*   GLUCOSE  114*   --   153*  91     Calcium:  Recent Labs      09/29/17   0521   CALCIUM  9.6     Ionized Calcium:No results for input(s): IONCA in the last 72 hours. Magnesium:  Recent Labs      09/28/17   0609   MG  1.7     Phosphorus:  No results for input(s): PHOS in the last 72 hours. BNP:No results for input(s): BNP in the last 72 hours. Glucose:  Recent Labs      09/29/17   0744   POCGLU  84     HgbA1C: No results for input(s): LABA1C in the last 72 hours. INR: No results for input(s): INR in the last 72 hours. Hepatic:   No results for input(s): ALKPHOS, ALT, AST, PROT, BILITOT, BILIDIR, LABALBU in the last 72 hours. Amylase and Lipase:No results for input(s): LACTA, AMYLASE in the last 72 hours. Lactic Acid: No results for input(s): LACTA in the last 72 hours. Troponin: No results for input(s): CKTOTAL, CKMB, TROPONINI in the last 72 hours. BNP: No results for input(s): BNP in the last 72 hours. CULTURES:   UA:   No results for input(s): SPECGRAV, PHUR, COLORU, CLARITYU, MUCUS, PROTEINU, BLOODU, RBCUA, WBCUA, BACTERIA, NITRU, GLUCOSEU, BILIRUBINUR, UROBILINOGEN, KETUA, LABCAST, LABCASTTY, AMORPHOS in the last 72 hours.     Invalid input(s): CRYSTALS  Micro:   Lab Results   Component Value Date    BC No growth-preliminary  No growth   09/21/2017         IMAGING:         Problem list of patient:     Patient Active Problem List   Diagnosis Code    GIST (gastrointestinal stromal tumor), non-malignant D21.4    Essential hypertension I10    Cirrhosis of liver with ascites ([facility] K74.60    Cancer ([facility] C80.1    Hypothyroidism E03.9    Dizzy spells R42    Fever R50.9    Epigastric pain R10.13    History of atrial fibrillation Z86.79    [facility] K74.3    Hypertensive urgency I16.0    Urethrovaginal fistula N82.1    Pleural effusion J90    Acute hepatic encephalopathy K72.00    Thrombocytopenia (HCC) D69.6    Microcytic anemia D50.9    Hyperbilirubinemia E80.6    Hypokalemia E87.6    Urinary retention R33.9    Hypercalcemia, not POA E83.52    Acute renal failure with tubular necrosis (HCC) N17.0    Pneumonia, organism unspecified J18.9    BEN (acute kidney injury) ([facility] N17.9    Dehydration E86.0    Acquired hypothyroidism E03.9    Hypovitaminosis D E55.9    Hepatic encephalopathy (HCC) K72.90    Hypothermia, not POA T68. XXXA    Sludge in gallbladder K82.8    Hyperparathyroidism ([facility] E21.3    Bilateral leg edema M73.1    Metabolic acidosis G30.0    Severe malnutrition (HCC) E43         ASSESSMENT/PLAN   -Biliary Cirrhosis with encephalopathy: better. Acute and subacute stroke  Pneumonia suspected aspiration.  Due to aspiration will stop antibiotic on sunday  BEN: better  Ischemic stroke acute and subacute  Continue supportive care  Discussed with daughter  Britt Vasques MD, [address] 9/29/2017 4:47 PM

## 2017-09-29 NOTE — PROGRESS NOTES
Physical Therapy   [address] [facility]  INPATIENT PHYSICAL THERAPY  DAILYNOTE  [facility] ON[facility] MED 5K    Time In: 1665  Time Out: 1108  Timed Code Treatment Minutes: 23 Minutes  Minutes: 23          Date: 2017  Patient Name: Rosalinda Baird,  Gender:  female        MRN: [id number]  : 1940  (79 y.o.)     Referring Practitioner: Katty Doshi  Diagnosis: hypertensive urgency  Additional Pertinent Hx: admit with above diagnosis, EBN, metabolic acidosis, bilateral pneumonia, hepatic cirrhosis, liver cirrhosis     Past Medical History:   Diagnosis Date    Anemia     Arthritis     Blood transfusion reaction     Cancer ([facility]     colon in polyps    Cirrhosis ([facility]     biliary    Heart murmur     Hernia     Hypertension     Thyroid disease     Unspecified diseases of blood and blood-forming organs     anemia     Past Surgical History:   Procedure Laterality Date    CATARACT REMOVAL      jolene     COLON SURGERY  2002    resection Dr. Lockwood Gene      Dr. Bella Anne  as a child   [address] [facility] TUMOR REMOVAL      of stomach  Dr. Epifanio Salazar  3 2013    Dr. Peyman Delong       Restrictions/Precautions:  Restrictions/Precautions: General Precautions, Fall Risk                      Subjective:     Subjective: RN approved session and present to assist with transfer, second RN came to room to also assist. Pt resting in bed at arrival confused, agreeable to get up to chair.      Pain:   .  Pain Assessment  Pain Assessment: Faces  Pain Level: 0       Social/Functional:  Lives With: Alone  Type of Home: House  Home Layout: One level  Home Access: Stairs to enter with rails  Entrance Stairs - Number of Steps: 3  Home Equipment:  (no AD used PTA)     Objective:  Supine to Sit: Moderate assistance (x2, HOB elevated, pt able to slowly move LE off EOB, assisted at shlders to bring trunk to upright)  Scooting: Maximal assistance (x1 to

## 2017-09-29 NOTE — FLOWSHEET NOTE
09/29/17 1429   Urethral Catheter Double-lumen   Placement Date/Time: 09/15/17 0730   Inserted by: Dr. Linnea Stovall  Catheter Type: Double-lumen  Collection Container: Standard  Urine Returned: Yes   Output (mL) 175 mL   secure messaged Dr Femi Swanson, updating her on pt low urine output.

## 2017-09-29 NOTE — PLAN OF CARE
Problem: Falls - Risk of  Goal: Absence of falls  Outcome: Met This Shift  Fall assessment completed. Bed alarm on. Call lightin reach. Falling star posted. Non-slip socks on. Problem: Nutrition  Goal: Optimal nutrition therapy  Outcome: Ongoing  Poor oral intakes. Oral fluids encouraged but patient does not like thickened liquids. Problem: Risk for Impaired Skin Integrity  Goal: Tissue integrity - skin and mucous membranes  Structural intactness and normal physiological function of skin and  mucous membranes. Outcome: Met This Shift  No skin breakdown noted. Encouraged to turn and reposition and will lie on right side for short time, but then request to lie on back. Problem: Discharge Planning:  Goal: Participates in care planning  Participates in care planning   Outcome: Ongoing  Goal: Discharged to appropriate level of care  Discharged to appropriate level of care   Outcome: Not Met This Shift  Possible ECF at discharge. Problem: Airway Clearance - Ineffective:  Goal: Ability to maintain a clear airway will improve  Ability to maintain a clear airway will improve   Outcome: Ongoing  Moist, productive cough. Rhonchi throughout lungs. Problem: Anxiety/Stress:  Goal: Level of anxiety will decrease  Level of anxiety will decrease   Outcome: Met This Shift  Calm, cooperative. Problem: Aspiration:  Goal: Absence of aspiration  Absence of aspiration   Outcome: Ongoing  ST seeing, MBS ordered. Problem: Bowel Function - Altered:  Goal: Bowel elimination is within specified parameters  Bowel elimination is within specified parameters   Outcome: Met This Shift  Continues on lactulose. Needs much encouragement to take. 2 soft stools this shift. Problem: Cardiac Output - Decreased:  Goal: Hemodynamic stability will improve  Hemodynamic stability will improve   Outcome: Met This Shift  VS stable. On telemetry.     Problem: Mental Status - Impaired:  Goal: Mental status will be restored to

## 2017-09-29 NOTE — PROGRESS NOTES
[place] [address] [facility] 5K  Modified Barium Swallow    SLP Individual Minutes  Time In:   Time Out: 105  Minutes: 10          [] [address]   [x] Acute Care [] Transitional Care Unit [] IP Rehab    Date: 2017  Patient Name: Yanira Zepeda      CSN: [id number]   : 1940  (68 y.o.)  Gender: female   Referring Physician:  Dr. Hamlet Dawson  Diagnosis: Hypertensive Urgency  Secondary Diagnosis:  Dysphagia   History of Present Illness/Injury: Pt was admitted with above diagnosis. Complicated medical course with Pt showing decline in medical status. Concern for aspiration with MBS ordered to assess pharyngeal function. has a past medical history of Anemia; Arthritis; Blood transfusion reaction; Cancer ([facility] Cirrhosis ([facility] Heart murmur; Hernia; Hypertension; Thyroid disease; and Unspecified diseases of blood and blood-forming organs.   Pain: Did not report    Current Diet: Puree with nectar thick liquids    Respiratory Status: [x] Independent [] Nasal Cannula [] Oxygen Mask      [] Tracheostomy [] Other:     [] Ventilator/Settings:    Behavioral Observation: [] Alert [] Oriented   [] Confused [x] Lethargic      [] Dysarthric [] Limited Direction Following  [] Agitated [] Other:    PATIENT WAS EVALUATED USING:  Thin and nectar thick liquids and pudding    ORAL PREPARATION PHASE: [x] WFL  [] Impaired   [] Slow mastication [] Uncoordinated mastication [] Decreased bolus control   [] Decreased bolus formation [] Loss of bolus from lips / Anterior spillage   [] OTHER:    ORAL PHASE: [x] [facility]  [] Impaired   [] Residue in the anterior sulcus [] Residue in the lateral sulcus  right   [] Residue in the lateral sulcus  left [] Uncoordinated AP movement   [] Slow AP movement   [] Decreased lingual elevation   [] Decreased tongue base retraction [] Uncontrolled bolus / diffuse fall lover tongue base   [] Piecemeal deglutition    [] OTHER:     ORAL PHASE MARILYNN SCORE: (Dysphagia outcome and severity scale)  [] 7 = Normal in all situations  [x] 6 = WFL / Mod I; Normal diet; May have mild oral delay  [] 5 = Mild dysphagia; May have one diet consistency restricted; Mild oral residue clears. [] 4 = Mild-Moderate dysphagia; May have one or two diet consistencies restricted; Oral residue clears with cue; Intermittent supervision or cueing. [] 3 = Moderate dysphagia; Total assist with strategies; Two or more consistencies restricted; Moderate oral residue clears with cue;   [] 2 = Moderately Severe dysphagia; Tolerates at least one consistency safely with total use of strategies; Severe oral residue and unable to clear or needs multiple cues; Non-oral nutrition. [] 1 = Severe dysphagia; NPO; Unable to tolerate any po safely; Severe oral loss of bolus or oral residue; Non-oral nutrition. PHARYNGEAL PHASE: [] WFL  [x] Impaired   [] Absent Swallow  [] Delayed Swallow [] Decreased airway protection   [] Decreased epiglottic inversion [] Decreased hyolaryngeal elevation   [] Residue in the valleculae [x] Residue in the pyriform sinus    [] Cricopharyngeal dysfunction  [] Residue along posterior pharyngeal wall   [] Residue along the ariepiglottic folds. [] OTHER:    PHARYNGEAL PHASE MARILYNN SCORE: (Dysphagia outcome and severity scale)  [] 7 = Normal in all situations; Normal diet; No strategies  [] 6 = WFL / Mod I; May have mild pharyngeal delay or residue but clears spontaneously; No aspiration or laryngeal penetration  [x] 5 = Mild dysphagia:  May need one consistency restricted; May have one or more of the following:  Aspiration with thin  cough to clear; Airway penetration midway to the vocal cords with one or more consistency or to the vocal folds with one consistency, but clears spontaneously; Residue in the pharynx clears spontaneously. [] 4 = Mild  Moderate dysphagia: One or two consistencies restricted;  May exhibit one or more of the following:  Residue clears with cue; Aspiration of one consistency with weak or no reflexive cough; Laryngeal penetration to the vocal cords with cough with two consistencies; Laryngeal penetration to the vocal cords without cough on one consistency. [] 3 = Moderate dysphagia:  Two or more diet consistencies restricted; May exhibit one or more of the following: Moderate residue clears with cue; Airway penetration to the level of the vocal folds without cough with two or more consistencies; Aspiration with two consistencies with weak or no reflexive cough; Aspiration of one consistency, no cough and airway penetration with one consistency, no cough. [] 2 = Moderately Severe dysphagia: Tolerates at least one consistency safely with total use of strategies; Non-oral nutrition; Severe residue unable to clear; Aspiration with two or more consistencies with no cough; Aspiration with one or more consistency, no cough and airway penetration to the vocal folds with one or more consistency, no cough. [] 1 = Severe dysphagia:  NPO; Unable to tolerate any po safely; Severe residue unable to clear; Silent aspiration with two or more consistencies, non-functional cough;  OR Unable to achieve a swallow.       SIGNS AND SYMPTOMS OF LARYNGEAL PENETRATION / ASPIRATION:  [] No evidence of laryngeal penetration  [] No evidence of aspiration  [x] Laryngeal penetration evident with: thin liquids by spoon  [] Audible aspiration evident with:  [] Silent aspiration evident with:    PENETRATION-ASPIRATION SCALE (PAS):  [] 1 = Material does not enter the airway  [x] 2 = Material enters the airway, remains above the vocal folds, and is ejected from the airway  [] 3 = Material enters the airway, remains above the vocal folds, and is not ejected from airway  [] 4 = Material enters the airway, contacts the vocal folds, and is ejected from the airway  [] 5 = Material enters the airway, contacts the vocal folds, and is not ejected from the airway  [] 6 = Material enters the airway, passes below the vocal folds, and is ejected into the larynx or out of the airway  [] 7 = Material enters the airway, passes below the vocal folds, and is not ejected from the trachea despite effort  [] 8 = Material enters the airway, passes below the vocal folds, and no effort is made to eject    ESOPHAGEAL PHASE:   [] No significant findings  [x] See Radiology Report for details  [] Recommend further esophageal testing    ATTEMPTED TECHNIQUES:      Comments:  [x]Small bolus size [x] Effective [] Not Effective    []Straw [] Effective [] Not Effective    [x]Cup [x] Effective [] Not Effective    []Chin tuck [] Effective [] Not Effective    []Head Turn [] Effective [] Not Effective    [x]Spoon presentations [] Effective [x] Not Effective Not effective for thin liquids   [] Volitional cough [] Effective [] Not Effective    [] Spontaneous cough [] Effective [] Not Effective    []OTHER: [] Effective [] Not Effective      DIAGNOSTIC IMPRESSIONS:  Pt presents with mild oral and pharyngeal dysphagia evidenced by flash penetration with thin liquids via spoon. Fatigue is a factor in increasing aspiration risk. Advanced textures were not attempted due to fatigue and emesis of all barium consistencies following pudding trials. No evidence of aspiration with pudding or nectar thick liquids by cup. Recommend puree with nectar thick liquids. Pt may benefit from several small meals throughout the day to reduce fatigue. Upgrade when appropriate.       DIET RECOMMENDATIONS:  Puree with nectar thick liquids    STRATEGIES:   [] Full upright position  [x] Small bite/sip   [] No Straw   [] Multiple Swallow  [] Chin tuck   [] Head turn   [] Pulmonary monitoring [] Oral care after all meals  [] Supervision   [] Medication in applesauce   []Direct 1:1 Supervision [] Spoon all liquids   [] Alternate solid / liquid [] Limit distractions   [x] Monitor for fatigue  [] PMV in place for all po   []

## 2017-09-29 NOTE — PROGRESS NOTES
Hospitalist Progress Note      Patient:  Lissett Mir      Unit/Bed:5K-04/004-A    YOB: 1940    MRN: [id number]       Acct: [de-identified]     PCP: Kellie Oliva    Date of Admission: 9/15/2017    Assessment/Plan:  1. Subacute ischemic CVA both anterior and posterior circulations in left frontal lobe > right frontal lobe and bilateral occipital lobes, left parietal lobe;  Subacute ischemic CVA of different age in left cerebellum   -- d/w Dr. Terrea Galeazzi 9/28 and to eval -- carotids (p)  -- ?need HEAVEN r/o embolic source as hx afib --> c/s cardio for ? HEAVEN  -- unable to give ASA, anticoagulate due to cirrhosis/thrombocytopenia  -- check lipids 9/30  -- PT/OT/ST already following  -- ? Due to PAF - EKG and tele with ?afib -- c/s cardio for ? HEAVEN -- ?candidate for watchman device as unable to anticoagulate and high risk for CVA. 2. Acute Hepatic encephalopathy -- POA --  Elevated ammonia on admission and much improved s/p tx -- cont On lactulose and xifaxan -- last ammonia 9/28 WNL  3. Metabolic encephalopathy -- multifactorial during admission -- combo of Hyperammonia anemia, hypercalcemia, possible infectious process and now with +CVA on MRI 9/28/17  -- however pt with encephalopathy despite normalization of ammonia, lytes and afeb -> neuro consulted and Dr. Terrea Galeazzi saw 9/26 -- CT head 9/24 (-) acute;  EEG 9/26 with encephalopathy but no seizures  -- checked MRI 9/28 with #1  -- ammonia 9/28 WNL  4. Dysphagia -- ST following - ?due to #1 -- CT head 9/24 (-) acute -- ?due to deconditioning -- monitor closely and on dysphagia diet   -- checked MRI head 9/28 with #1 -- done as per ST 9/28 her swallowing has continued to decline over last week   -- MBS 9/29 per d/w ST (p)  5. Metabolic acidosis -- worsened 9/24 --> bicarb gtt started 9/24 and stopped 9/28 per Renal as bicarb improved  6.  Diffuse edema -- arms and legs --> due to poor nutritional status, low albumin, IVF --> ++fluid balance - ?stop IVF and ?need diuresis - ?wrap legs  7. ?PAF vs sinus arrhythmia with PACs frequently - c/s cardio 9/29 -- ?need med - tele added 9/28 and cont monitor  8. Hypothermia -- 9/21 and ID consulted for concern for sepsis --  ??etiology - ?endo related and received solumedrol 9/20   -- likely due to pneumonia --?due to #1   -- weaned off bear hugger 9/27 am and temp stable  -- Dr. Sarai Minaya changed atbx to rocephin 9/25 (zosyn 9/18 - 9/25)  -- blood cx 9/21 (-) - urine with candida 9/21 - ?treat  9. Bradycardia -- 9/21 -- resolved -- ?resume BB with frequent PACs and ? PAF  10. Hx of PBC/acutely decompensated cirrhosis -- POA -- s/p JAQUAN eval during admission and has since signed off --  on ursodiol per hx of PBC. U/s with trace ascites early on in admission. 11. BEN --  not POA. multifactorial -- per renal -- slowly improving and down to 1.9 on 9/28  from 3.0 max on 9/24 (was 1.1 on admission 9/15)  -- ?hepato-renal syndrome -  ?hypovolemia and renal has pt on IVF   12. RLL and LLL Bacterial pneumonia -- likely POA --  likely due to aspiration CT-chest earlier in admit with findings concerning for aspiration pneumonia, likely POA. -- pulm followed during admission from only 9/18 - 9/19 and signed off. -- weaned to RA - needs pulm tolieting  -- zosyn 9/18 - to 9/25 and rocephin started 9/25 per ID  -- CXR 9/28 with ?right elevated hemidiaphragm and ?underlying infiltrate and LLL/retrocardiac opacification -- needs pulm tolieting  13.  Right pleural effusion --  small and likely per pulm c/s during admission due to cirrhosis/hepatic hydrothorax vs parapneumonic -- however No significant ascites amenable to paracentesis on abdominal US to completely associate with hepatic hydrothorax  -- pulm evaluated 9/18 and felt pt did not need thoracentesis at that time and signed off 9/19.  -- echo 9/15 = mild LVH, no wall abn, EF 65%, mod/severely dilated LA, mod TR, mod pulm HTN   -- repeat CXR 9/28 with elevated right hemidiaphragm, new retrocardiac left lung base opacification - ?fluid vs pneumonia. 14. Hypokalemia -- replace and monitor. Worsened by bicarb gtt  15. Hypercalcemia -- due to primary hyperparathyroidism --  Endocrinology consulted and followed -- Received Calcitonin SQ x 1 and daily Cinacalcet 30 mg starting 9/18. -- sestamibi scan 9/26/17 (-) for parathyroid adenoma  16. Accelerated hypertension/hypertensive urgency -- POA/essential HTN  -- resolved --  Was on labetalol infusion, which has been titrated off. And BB stopped 9/21 due to bradycardia -- cont norvasc -- monitor and adjust prn. 17. Cholestasis -- with ?acalculus cholecystitis but ruled out-- general surgery was consulted and DR. Stephanie Reinoso saw on 9/21 -- HIDA showed no acute cholecystitis -- s/p tx with cipro, flagyl 9/18 - 9/21  18. Hypoalbuminemia -- likely due to cirrhosis  19. Urinary retention -- Huerta catheter had to be placed by urology on admission; to follow up with urologist as outpatient. 20. Elevated alk phos -- due to cirrhosis -- HIDA scan (-) for acute barbie or biliary dysfxn  21. Hx of afib -- not on anticoagulation per chronic thrombocytopenia and bleeding risk. HR irregular sounding 9/28 --> check EKG  22. Chronic iron deficiency anemia -- likely due to cirrhosis --stable 9-10's - no acute signs of bleeding  23. Chronic thrombocytopenia -- likely due to cirrhosis -- renal checked for myeloma and kappa/lambda free light chains unremarkable  24. ?urethrovaginal fistula -- per H&P - huerta placed per urology and f/u oupt  25. Hypothyroidism -- per endo during admission -- cont synthroid -_ TFT WNL during admission  26. Hypovitaminosis D -- Vit D level only 10 but per endocrinology no replacement due to hypercalcemia/primary hyperparathyroidism however on vit D supplement per renal  27. Ventral hernia -- has followed with Dr. Ivonne Gil in past in 2014 and recommended observation only  28.  Severe malnutrition -- dieticians following - ?need NG and TF with swallowing issues -- [facility] 9/29 29. Deconditioning -- PT/OT following    Dispo  -- 9/29 --> apprec consultants - new CVA's - d/w Dr. Dakota Marin 9/28 -- carotids (p) -- ?need HEAVEN with arrhythmia and likely embolic etiology of CVA's -- c/s cardio - cont monitor lytes, BP, renal fxn - ?need diuresis -> await renal recs. Cont attempt pulm tolieting. Atbx per ID - pallative care following now with new CVAs -- cont monitor clinically. Plan ECF at d/c    -- 9/28 --> apprec consultants -- concern with declining swallowing status and weakness seems worse on right and ?left foot drop but is so weak  Will check MRI head r/o CVA for declining condition -- ? ??need diuresis with wt up (last wt 9/23) and ++fluid status, +edema --> renal following fluid status. Cont increase activity, [facility] 9/29 per d/w ST. Monitor lytes, BP, CBC, temps. Needs pulm tolieting.    --9/27 --> apprec consultants - blanche adame off this am and monitor temp -- cont monitor lytes, renal fxn and acidosis - bicarb per renal.  Cont atbx per ID with rocephin. Cont monitor mental status -- monitor swallowing as has very wet cough - repeat CXR in am as ??aspiration. Per staff and family pt is mentating better but is very weak - PT/OT and ?plan for d/c. Chief Complaint: confusion    Hospital Course: Tala yMrick is a 68 y.o. female with cirrhosis due to [facility], GAVE, esophageal varices, hx GIST with resection, HTN, hypothyroidism, afib admitted with accelerated HTN, AMS, found to have hyperammonemia of 143.  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   -- Mental status has been up and down despite improvement of ammonia levels - neuro was consulted 9/26 - CT head 9/24 (-) for acute issue and moderate SVID. EEG 9/26 with encephalopathy but no seizure. -- has been having swallowing issues  -- developed hypothermia on 9/21 and ID consulted - on zosyn and changed to rocephin 9/25 -- temp/bear hugger off 9/27      Subjective:   -- 9/29 --> pt awake, oriented x 2 this am - denies pain. States her breathing is \"ok but not great. \"  ++wet weak cough. No cp. No abd pain. No n/v today. No BMs documented. Afeb. On RA. No weights done and no accurate I/O    -- 9/28 --> pt alert, oriented x 3. Denies any pain. Feels little sob.  +wet cough and cough is very weak. No cp. No n/v.  D/w speech therapist and pt last week was on a general diet and doing ok and they have noticed quite a decline. Pt states she's tired. No stools being recorded. ?? Really +14L since admission. U/o not accurately measured. ??? Weight actually up 28 lbs on 9/23.    -- 9/27 --> pt more alert per RN and family. More oriented and able to have a conversation however speech is quiet and slow. +trouble swallowing and per Rn pt fatigues quickly. Not eating much. No n/v. No abd pain. NO f/c. No cp/sob. Afeb.         Medications:  Reviewed    Infusion Medications    sodium chloride 50 mL/hr at 09/29/17 0502     Scheduled Medications    potassium replacement protocol   Other RX Placeholder    cefTRIAXone (ROCEPHIN) IV  1 g Intravenous Q24H    heparin (porcine)  5,000 Units Subcutaneous 3 times per day    vitamin D  5,000 Units Oral Daily    rifaximin  550 mg Oral BID    levothyroxine  112 mcg Oral Daily    ursodiol  300 mg Oral BID    cinacalcet  30 mg Oral Daily    famotidine  20 mg Oral Daily    amLODIPine  10 mg Oral Daily    nicotine  1 patch Transdermal Daily    phosphorus replacement protocol   Other RX Placeholder    lactulose  20 g Oral TID    sodium chloride flush  10 mL Intravenous 2 times per day     PRN Meds: melatonin, haloperidol lactate, acetaminophen, magnesium hydroxide, ondansetron, potassium chloride, magnesium sulfate, labetalol, sodium chloride flush      Intake/Output Summary (Last 24 hours) at 09/29/17 0828  Last data filed at 09/29/17 0656   Gross per 24 hour   Intake          2196.54 ml   Output             1175 ml   Net          1021.54 ml       Diet:  DIET DYSPHAGIA I PUREED; Nectar Thick; No Drinking Straw  Dietary Nutrition Supplements: Frozen Oral Supplement    Exam:  BP (!) 147/62  Pulse 84  Temp 98.4 °F (36.9 °C) (Oral)   Resp 18  Ht 5' 1\" (1.549 m)  Wt 164 lb 9.3 oz (74.7 kg)  SpO2 94%  BMI 31.1 kg/m2    General appearance: chronically ill appearing, appears older than stated age and cooperative.  +wet weak cough  HEENT: Pupils equal, round, and reactive to light. Conjunctivae/corneas clear. Neck: Supple, with full range of motion. No jugular venous distention. Trachea midline. Respiratory:  Normal respiratory effort. Diminished with +upper airway rhonchi-- no Rales/Wheezes/Rhonchi. No respiratory distress or accessory muscle use. Cardiovascular: irreg, irreg with normal S1/S2 without murmurs, rubs or ?? gallops. Abdomen: Soft, non-tender, non-distended with normal bowel sounds. No rebound or guarding  Musculoskeletal: No clubbing, cyanosis, 3+ pitting edema bilaterally of BLE and also with some dependent edema BUE. Joni Danker Limited range of motion due to weakness. No calf tenderness palpation  Skin: Skin color, texture, turgor normal.  No rashes or lesions.   Neurologic:  CN 2-12 intact with weak swallow, RUE strength 4-/5, 3/5 RLE and LLE - LUE 4+/5  Psychiatric: Alert and oriented x person/place, thought content appropriate, normal insight  Capillary Refill: Brisk,< 3 seconds   Peripheral Pulses: +1 palpable, equal bilaterally       Labs:   Recent Labs      09/28/17   0707   WBC  12.2*   HGB  10.7*   HCT  32.7*   PLT  53*     Recent Labs      09/26/17   0937  09/27/17   0502  09/27/17 of chest slightly worse than prior. **This report has been created using voice recognition software. It may contain minor errors which are inherent in voice recognition technology. **      Final report electronically signed by Dr. Eleni Alamo on 9/21/2017 2:25 PM      XR Chest Standard TWO VW   Final Result   1. Mild cardiomegaly. 2. Small bilateral pleural effusions. 3. Mild bibasilar atelectasis/pneumonia. 4. Overall appearance of chest improved from prior. **This report has been created using voice recognition software. It may contain minor errors which are inherent in voice recognition technology. **      Final report electronically signed by Dr. Eleni Alamo on 9/19/2017 9:55 AM      CT CHEST WO CONTRAST   Final Result   1. Small right-sided pleural effusion. 2. Bilateral pneumonia, worse on the right side. 3. Stable cardiomegaly. 4. Cirrhosis. 5. Ventral hernia containing part of the stomach and proximal small bowel. Final report electronically signed by Dr. Maxwell Pugh on 9/17/2017 4:20 PM      US LIVER   Final Result   1. Cirrhotic liver with possible recannulated emboli: Vein. 2. Marked periportal pericaval adenopathy. Follow-up with CT scan is recommended. 3. Gallbladder sludge with gallbladder wall thickening and pericholecystic edema. Acalculous cholecystitis is a possibility. **This report has been created using voice recognition software. It may contain minor errors which are inherent in voice recognition technology. **      Final report electronically signed by Dr. Melissa Ragsdale on 9/15/2017 7:23 PM      US GALLBLADDER RUQ   Final Result   1. Cirrhotic liver with possible recannulated emboli: Vein. 2. Marked periportal pericaval adenopathy. Follow-up with CT scan is recommended. 3. Gallbladder sludge with gallbladder wall thickening and pericholecystic edema. Acalculous cholecystitis is a possibility.             **This report has been created using voice recognition software. It may contain minor errors which are inherent in voice recognition technology. **      Final report electronically signed by Dr. Maricruz Ray on 9/15/2017 7:23 PM      XR Chest Portable   Final Result   NG tube is well into the stomach            **This report has been created using voice recognition software. It may contain minor errors which are inherent in voice recognition technology. **      Final report electronically signed by Dr. Maricruz Ray on 9/15/2017 7:10 PM      XR Chest Portable   Final Result   NG tube in the distal esophagus            **This report has been created using voice recognition software. It may contain minor errors which are inherent in voice recognition technology. **      Final report electronically signed by Dr. Maricruz Ray on 9/15/2017 6:10 PM      US ABDOMINAL LIMITED   Final Result      Trace amount of fluid within the abdomen. Insufficient quantity of ascites to safely perform an ultrasound-guided paracentesis. **This report has been created using voice recognition software. It may contain minor errors which are inherent in voice recognition technology. **      Final report electronically signed by Dr. Marjorie Hayes on 9/15/2017 5:13 PM      XR Chest Portable   Final Result   1. Findings consistent with small to moderate size right pleural effusion with underlying atelectasis. Also in the differential would be early infiltrate but this is less favored. Recommend a true PA and lateral chest x-ray preferably in the x-ray    department if patient can tolerate. **This report has been created using voice recognition software. It may contain minor errors which are inherent in voice recognition technology. **      Final report electronically signed by Dr. Kayla Will on 9/15/2017 9:14 AM      Fluoroscopy modified barium swallow with video    (Results Pending)       Diet: DIET DYSPHAGIA I PUREED;  Nectar Thick; No Drinking Straw  Dietary Nutrition Supplements: Frozen Oral Supplement    Aguilar: yes    Microbiology:  Blood cx 9/15 (-)    Blood cx 9/21 (-)    Urine cx 9/21 candida glabrata    Tele:  SR with sinus arrhythmia with frequent PVC vs afib with rate up to 140's    DVT prophylaxis: [] Lovenox                                 [] SCDs                                 [x] SQ Heparin                                 [] Encourage ambulation           [] Already on Anticoagulation     Disposition:    [] Home       [] TCU       [] Rehab       [] Psych       [x] SNF       [] [place]       [] Other-    Code Status: Limited    PT/OT Eval Status: following          Electronically signed by Irma Alexander MD on 9/29/2017 at 8:28 AM

## 2017-09-29 NOTE — PROGRESS NOTES
5,000 Units Subcutaneous 3 times per day    vitamin D  5,000 Units Oral Daily    rifaximin  550 mg Oral BID    levothyroxine  112 mcg Oral Daily    ursodiol  300 mg Oral BID    cinacalcet  30 mg Oral Daily    famotidine  20 mg Oral Daily    amLODIPine  10 mg Oral Daily    nicotine  1 patch Transdermal Daily    phosphorus replacement protocol   Other RX Placeholder    lactulose  20 g Oral TID    sodium chloride flush  10 mL Intravenous 2 times per day     Continuous Infusions:   sodium chloride 50 mL/hr at 09/29/17 0502       Labs:     Recent Labs      09/26/17   0937  09/27/17   0502  09/27/17   2356  09/28/17   0609  09/29/17   0521   NA  149*  145   --   143  145   K  3.3*  3.1*  3.4*  3.2*  3.3*   CL  111  108   --   106  106   CO2  20*  21*   --   24  24   BUN  75*  71*   --   68*  66*   CREATININE  2.7*  2.3*   --   1.9*  1.8*   LABGLOM  17*  21*   --   26*  27*   GLUCOSE  151*  114*   --   153*  91   MG  1.9  1.9   --   1.7   --    CALCIUM  9.4  9.4   --   9.3  9.6   CAION   --    --    --   1.21   --      Renal US  1. Echogenic right renal cortex, possibly secondary to medical renal disease, but otherwise unremarkable renal ultrasound. 2. Aguilar catheter in a nondistended urinary bladder. NM PARATHYROID PLANAR W/SPECT & CT  Poor washout of radiotracer from the thyroid gland without evidence of parathyroid adenoma. EEG  IMPRESSION:  This is a mildly abnormal EEG due to the excessive  slowing seen in the theta range suggestive of cortical dysfunction such  as seen with encephalopathies. However, there was no evidence of  epileptiform activity appreciated. CXR 9/28  There is new elevation of the right hemidiaphragm to the level of the right hilum. Underlying pleural fluid and atelectasis/infiltrates cannot be excluded. The right upper lung zones are clear.   2. There is new opacification obscuring the retrocardiac left lung base with the differential including pleural fluid and Bilateral leg edema    Metabolic acidosis    Severe malnutrition ([facility] [address]     Keon Carbone CNP 9/29/2017 9:10 AM

## 2017-09-29 NOTE — PROGRESS NOTES
MRI reviewed. This is consistent w/ proximal embolic event. Will check HEAVEN. Pt appears to be high fall risk but requires anticoagulation. I have spoken to Dr. Heidi Arceo, he has agreed to evaluate the pt for watchmen device. Please feel free to call with any questions. Jared Guevara M.D., J.D.   Vascular Neurology and Endovascular Surgical Neuroradiology  Cell Number: [phone]

## 2017-09-30 LAB
AMMONIA: 20 UMOL/L (ref 11–60)
ANION GAP SERPL CALCULATED.3IONS-SCNC: 16 MEQ/L (ref 8–16)
ANISOCYTOSIS: ABNORMAL
BASOPHILS # BLD: 0.3 %
BASOPHILS ABSOLUTE: 0 THOU/MM3 (ref 0–0.1)
BUN BLDV-MCNC: 61 MG/DL (ref 7–22)
CALCIUM IONIZED: 1.34 MMOL/L (ref 1.12–1.32)
CALCIUM SERPL-MCNC: 9.9 MG/DL (ref 8.5–10.5)
CHLORIDE BLD-SCNC: 108 MEQ/L (ref 98–111)
CHOLESTEROL, TOTAL: 163 MG/DL (ref 100–199)
CO2: 22 MEQ/L (ref 23–33)
CREAT SERPL-MCNC: 1.7 MG/DL (ref 0.4–1.2)
CRENATED RBC'S: ABNORMAL
EOSINOPHIL # BLD: 0 %
EOSINOPHILS ABSOLUTE: 0 THOU/MM3 (ref 0–0.4)
GFR SERPL CREATININE-BSD FRML MDRD: 29 ML/MIN/1.73M2
GLUCOSE BLD-MCNC: 137 MG/DL (ref 70–108)
HCT VFR BLD CALC: 35.1 % (ref 37–47)
HDLC SERPL-MCNC: 23 MG/DL
HEMOGLOBIN: 11.3 GM/DL (ref 12–16)
HYPOCHROMIA: ABNORMAL
LDL CHOLESTEROL CALCULATED: 127 MG/DL
LYMPHOCYTES # BLD: 3.5 %
LYMPHOCYTES ABSOLUTE: 0.5 THOU/MM3 (ref 1–4.8)
MAGNESIUM: 1.9 MG/DL (ref 1.6–2.4)
MCH RBC QN AUTO: 25.6 PG (ref 27–31)
MCHC RBC AUTO-ENTMCNC: 32.3 GM/DL (ref 33–37)
MCV RBC AUTO: 79.3 FL (ref 81–99)
MICROCYTES: ABNORMAL
MONOCYTES # BLD: 12.7 %
MONOCYTES ABSOLUTE: 1.9 THOU/MM3 (ref 0.4–1.3)
NUCLEATED RED BLOOD CELLS: 0 /100 WBC
OVALOCYTES: ABNORMAL
PDW BLD-RTO: 22.8 % (ref 11.5–14.5)
PLATELET # BLD: 67 THOU/MM3 (ref 130–400)
PLATELET ESTIMATE: ABNORMAL
PMV BLD AUTO: 9.8 MCM (ref 7.4–10.4)
POIKILOCYTES: ABNORMAL
POTASSIUM SERPL-SCNC: 3.9 MEQ/L (ref 3.5–5.2)
RBC # BLD: 4.43 MILL/MM3 (ref 4.2–5.4)
RBC # BLD: ABNORMAL 10*6/UL
SCAN OF BLOOD SMEAR: NORMAL
SCHISTOCYTES: ABNORMAL
SEG NEUTROPHILS: 83.5 %
SEGMENTED NEUTROPHILS ABSOLUTE COUNT: 12.2 THOU/MM3 (ref 1.8–7.7)
SODIUM BLD-SCNC: 146 MEQ/L (ref 135–145)
TRIGL SERPL-MCNC: 67 MG/DL (ref 0–199)
WBC # BLD: 14.6 THOU/MM3 (ref 4.8–10.8)

## 2017-09-30 PROCEDURE — 99232 SBSQ HOSP IP/OBS MODERATE 35: CPT | Performed by: HOSPITALIST

## 2017-09-30 PROCEDURE — 82140 ASSAY OF AMMONIA: CPT

## 2017-09-30 PROCEDURE — 85025 COMPLETE CBC W/AUTO DIFF WBC: CPT

## 2017-09-30 PROCEDURE — 36415 COLL VENOUS BLD VENIPUNCTURE: CPT

## 2017-09-30 PROCEDURE — 1210000002 HC MED SURG R&B - NEUROSCIENCE

## 2017-09-30 PROCEDURE — 2500000003 HC RX 250 WO HCPCS: Performed by: INTERNAL MEDICINE

## 2017-09-30 PROCEDURE — 83735 ASSAY OF MAGNESIUM: CPT

## 2017-09-30 PROCEDURE — 99232 SBSQ HOSP IP/OBS MODERATE 35: CPT | Performed by: INTERNAL MEDICINE

## 2017-09-30 PROCEDURE — 2580000003 HC RX 258: Performed by: INTERNAL MEDICINE

## 2017-09-30 PROCEDURE — 6360000002 HC RX W HCPCS: Performed by: INTERNAL MEDICINE

## 2017-09-30 PROCEDURE — 6370000000 HC RX 637 (ALT 250 FOR IP): Performed by: NURSE PRACTITIONER

## 2017-09-30 PROCEDURE — 6370000000 HC RX 637 (ALT 250 FOR IP): Performed by: INTERNAL MEDICINE

## 2017-09-30 PROCEDURE — 6370000000 HC RX 637 (ALT 250 FOR IP): Performed by: ANESTHESIOLOGY

## 2017-09-30 PROCEDURE — 6370000000 HC RX 637 (ALT 250 FOR IP): Performed by: HOSPITALIST

## 2017-09-30 PROCEDURE — 80061 LIPID PANEL: CPT

## 2017-09-30 PROCEDURE — 80048 BASIC METABOLIC PNL TOTAL CA: CPT

## 2017-09-30 PROCEDURE — 2500000003 HC RX 250 WO HCPCS: Performed by: HOSPITALIST

## 2017-09-30 PROCEDURE — 2580000003 HC RX 258: Performed by: NURSE PRACTITIONER

## 2017-09-30 PROCEDURE — 82330 ASSAY OF CALCIUM: CPT

## 2017-09-30 PROCEDURE — 6370000000 HC RX 637 (ALT 250 FOR IP): Performed by: FAMILY MEDICINE

## 2017-09-30 RX ORDER — BUMETANIDE 0.25 MG/ML
1 INJECTION, SOLUTION INTRAMUSCULAR; INTRAVENOUS 2 TIMES DAILY
Status: DISCONTINUED | OUTPATIENT
Start: 2017-09-30 | End: 2017-10-01

## 2017-09-30 RX ORDER — DILTIAZEM HYDROCHLORIDE 120 MG/1
120 CAPSULE, COATED, EXTENDED RELEASE ORAL DAILY
Status: DISCONTINUED | OUTPATIENT
Start: 2017-09-30 | End: 2017-09-30

## 2017-09-30 RX ADMIN — URSODIOL 300 MG: 300 CAPSULE ORAL at 21:25

## 2017-09-30 RX ADMIN — Medication 3 MG: at 03:24

## 2017-09-30 RX ADMIN — NYSTATIN 500000 UNITS: 100000 SUSPENSION ORAL at 14:16

## 2017-09-30 RX ADMIN — Medication 10 ML: at 21:26

## 2017-09-30 RX ADMIN — LACTULOSE 20 G: 20 SOLUTION ORAL at 21:25

## 2017-09-30 RX ADMIN — PIPERACILLIN SODIUM,TAZOBACTAM SODIUM 3.38 G: 3; .375 INJECTION, POWDER, FOR SOLUTION INTRAVENOUS at 21:26

## 2017-09-30 RX ADMIN — LACTULOSE 20 G: 20 SOLUTION ORAL at 09:08

## 2017-09-30 RX ADMIN — METOPROLOL TARTRATE 25 MG: 25 TABLET ORAL at 11:32

## 2017-09-30 RX ADMIN — URSODIOL 300 MG: 300 CAPSULE ORAL at 09:08

## 2017-09-30 RX ADMIN — METOPROLOL TARTRATE 25 MG: 25 TABLET ORAL at 21:25

## 2017-09-30 RX ADMIN — CEFTRIAXONE 1 G: 1 INJECTION, POWDER, FOR SOLUTION INTRAMUSCULAR; INTRAVENOUS at 10:09

## 2017-09-30 RX ADMIN — ACETAMINOPHEN 650 MG: 325 TABLET ORAL at 18:37

## 2017-09-30 RX ADMIN — BUMETANIDE 1 MG: 0.25 INJECTION, SOLUTION INTRAMUSCULAR; INTRAVENOUS at 21:24

## 2017-09-30 RX ADMIN — PIPERACILLIN SODIUM,TAZOBACTAM SODIUM 3.38 G: 3; .375 INJECTION, POWDER, FOR SOLUTION INTRAVENOUS at 14:23

## 2017-09-30 RX ADMIN — LEVOTHYROXINE SODIUM 112 MCG: 112 TABLET ORAL at 06:38

## 2017-09-30 RX ADMIN — VITAMIN D, TAB 1000IU (100/BT) 5000 UNITS: 25 TAB at 09:08

## 2017-09-30 RX ADMIN — AMLODIPINE BESYLATE 10 MG: 10 TABLET ORAL at 09:09

## 2017-09-30 RX ADMIN — FAMOTIDINE 20 MG: 20 TABLET, FILM COATED ORAL at 09:09

## 2017-09-30 RX ADMIN — NYSTATIN 500000 UNITS: 100000 SUSPENSION ORAL at 21:25

## 2017-09-30 RX ADMIN — LACTULOSE 20 G: 20 SOLUTION ORAL at 14:16

## 2017-09-30 RX ADMIN — CINACALCET HYDROCHLORIDE 30 MG: 30 TABLET, COATED ORAL at 09:09

## 2017-09-30 RX ADMIN — MICONAZOLE NITRATE: 2 POWDER TOPICAL at 21:25

## 2017-09-30 RX ADMIN — BUMETANIDE 1 MG: 0.25 INJECTION, SOLUTION INTRAMUSCULAR; INTRAVENOUS at 11:31

## 2017-09-30 RX ADMIN — HEPARIN SODIUM 5000 UNITS: 5000 INJECTION, SOLUTION INTRAVENOUS; SUBCUTANEOUS at 17:00

## 2017-09-30 RX ADMIN — DILTIAZEM HYDROCHLORIDE 120 MG: 120 CAPSULE, COATED, EXTENDED RELEASE ORAL at 03:04

## 2017-09-30 RX ADMIN — NYSTATIN 500000 UNITS: 100000 SUSPENSION ORAL at 17:00

## 2017-09-30 RX ADMIN — RIFAXIMIN 550 MG: 550 TABLET ORAL at 09:08

## 2017-09-30 RX ADMIN — HEPARIN SODIUM 5000 UNITS: 5000 INJECTION, SOLUTION INTRAVENOUS; SUBCUTANEOUS at 09:09

## 2017-09-30 RX ADMIN — RIFAXIMIN 550 MG: 550 TABLET ORAL at 21:25

## 2017-09-30 RX ADMIN — NYSTATIN 500000 UNITS: 100000 SUSPENSION ORAL at 09:08

## 2017-09-30 ASSESSMENT — PAIN DESCRIPTION - PAIN TYPE: TYPE: ACUTE PAIN

## 2017-09-30 ASSESSMENT — PAIN DESCRIPTION - LOCATION: LOCATION: HEAD

## 2017-09-30 ASSESSMENT — PAIN SCALES - GENERAL
PAINLEVEL_OUTOF10: 2
PAINLEVEL_OUTOF10: 3
PAINLEVEL_OUTOF10: 0

## 2017-09-30 ASSESSMENT — PAIN DESCRIPTION - DESCRIPTORS: DESCRIPTORS: ACHING

## 2017-09-30 NOTE — FLOWSHEET NOTE
09/30/17 0120   Provider Notification   Reason for Communication Evaluate   Provider Name Dr. Marcie Carrera   Provider Notification Physician   Method of Communication Call   Response See orders   Notification Time 0115   Physician notified of patient's heart rate jumping from the 90s [id number] several times in the past hour. This nurse was ordered to start diltiazem 120mg extended release capsule daily. Physician was also informed of urine leaking around the patient's urinary catheter. This nurse was instructed to flush the catheter.  If no success, this nurse was instructed to notify urology in AM.

## 2017-09-30 NOTE — PROGRESS NOTES
[facility]         Renal Inpatient Follow-Up note         9/30/2017 10:31 AM    Pt Name:   Sofia Siu:   [birth year]  Attending:   Isacc Martinez MD    Chief Complaint : Steve Weaver is a 68 y.o. female being followed by nephrology for Pilar    Interval History :   Patient seen and examined by me. No distress  Feels ok. No complaints. No cp or SOB. She is not eating and drinking well. Urine output is marginal     Scheduled Medications :    metoprolol tartrate  25 mg Oral BID    nystatin  5 mL Oral 4x Daily    potassium replacement protocol   Other RX Placeholder    cefTRIAXone (ROCEPHIN) IV  1 g Intravenous Q24H    heparin (porcine)  5,000 Units Subcutaneous 3 times per day    vitamin D  5,000 Units Oral Daily    rifaximin  550 mg Oral BID    levothyroxine  112 mcg Oral Daily    ursodiol  300 mg Oral BID    cinacalcet  30 mg Oral Daily    famotidine  20 mg Oral Daily    amLODIPine  10 mg Oral Daily    nicotine  1 patch Transdermal Daily    phosphorus replacement protocol   Other RX Placeholder    lactulose  20 g Oral TID    sodium chloride flush  10 mL Intravenous 2 times per day      sodium chloride 50 mL/hr at 09/29/17 0502       Vitals :  BP (!) 170/73  Pulse 100  Temp 97.6 °F (36.4 °C) (Oral)   Resp 20  Ht 5' 1\" (1.549 m)  Wt 165 lb 3.2 oz (74.9 kg)  SpO2 94%  BMI 31.21 kg/m2    24HR INTAKE/OUTPUT:      Intake/Output Summary (Last 24 hours) at 09/30/17 1031  Last data filed at 09/30/17 0508   Gross per 24 hour   Intake           3025.2 ml   Output             1750 ml   Net           1275.2 ml     Last 3 weights  Wt Readings from Last 3 Encounters:   09/30/17 165 lb 3.2 oz (74.9 kg)   02/16/17 131 lb (59.4 kg)   12/10/15 122 lb (55.3 kg)           Physical Exam :  General Appearance:  Well developed.  No distress  Mouth/Throat:  Oral mucosa moist  Neck:  Supple, no JVD  Lungs:  Breath sounds: clear  Heart[de-identified]  S1,S2 heard  Abdomen:  Soft, non -

## 2017-10-01 LAB
ANION GAP SERPL CALCULATED.3IONS-SCNC: 13 MEQ/L (ref 8–16)
ANISOCYTOSIS: ABNORMAL
BASOPHILIA: SLIGHT
BASOPHILS # BLD: 0.5 %
BASOPHILS ABSOLUTE: 0.1 THOU/MM3 (ref 0–0.1)
BUN BLDV-MCNC: 61 MG/DL (ref 7–22)
CALCIUM SERPL-MCNC: 10.8 MG/DL (ref 8.5–10.5)
CHLORIDE BLD-SCNC: 109 MEQ/L (ref 98–111)
CO2: 23 MEQ/L (ref 23–33)
CREAT SERPL-MCNC: 1.4 MG/DL (ref 0.4–1.2)
CRENATED RBC'S: ABNORMAL
EOSINOPHIL # BLD: 0.3 %
EOSINOPHILS ABSOLUTE: 0 THOU/MM3 (ref 0–0.4)
GFR SERPL CREATININE-BSD FRML MDRD: 36 ML/MIN/1.73M2
GLUCOSE BLD-MCNC: 108 MG/DL (ref 70–108)
GLUCOSE BLD-MCNC: 117 MG/DL (ref 70–108)
HCT VFR BLD CALC: 31 % (ref 37–47)
HEMOGLOBIN: 10.1 GM/DL (ref 12–16)
HYPOCHROMIA: ABNORMAL
LYMPHOCYTES # BLD: 2 %
LYMPHOCYTES ABSOLUTE: 0.3 THOU/MM3 (ref 1–4.8)
MCH RBC QN AUTO: 25.3 PG (ref 27–31)
MCHC RBC AUTO-ENTMCNC: 32.7 GM/DL (ref 33–37)
MCV RBC AUTO: 77.2 FL (ref 81–99)
MICROCYTES: ABNORMAL
MONOCYTES # BLD: 5.1 %
MONOCYTES ABSOLUTE: 0.7 THOU/MM3 (ref 0.4–1.3)
NUCLEATED RED BLOOD CELLS: 0 /100 WBC
PDW BLD-RTO: 22.5 % (ref 11.5–14.5)
PLATELET # BLD: 50 THOU/MM3 (ref 130–400)
PLATELET ESTIMATE: ABNORMAL
PMV BLD AUTO: 9.8 MCM (ref 7.4–10.4)
POIKILOCYTES: SLIGHT
POTASSIUM SERPL-SCNC: 3.6 MEQ/L (ref 3.5–5.2)
RBC # BLD: 4.01 MILL/MM3 (ref 4.2–5.4)
RBC # BLD: ABNORMAL 10*6/UL
ROULEAUX: SLIGHT
SCAN OF BLOOD SMEAR: NORMAL
SEG NEUTROPHILS: 92.1 %
SEGMENTED NEUTROPHILS ABSOLUTE COUNT: 12.4 THOU/MM3 (ref 1.8–7.7)
SODIUM BLD-SCNC: 145 MEQ/L (ref 135–145)
WBC # BLD: 13.5 THOU/MM3 (ref 4.8–10.8)

## 2017-10-01 PROCEDURE — 36415 COLL VENOUS BLD VENIPUNCTURE: CPT

## 2017-10-01 PROCEDURE — 6370000000 HC RX 637 (ALT 250 FOR IP): Performed by: FAMILY MEDICINE

## 2017-10-01 PROCEDURE — 2580000003 HC RX 258: Performed by: NURSE PRACTITIONER

## 2017-10-01 PROCEDURE — 1210000002 HC MED SURG R&B - NEUROSCIENCE

## 2017-10-01 PROCEDURE — 6360000002 HC RX W HCPCS: Performed by: INTERNAL MEDICINE

## 2017-10-01 PROCEDURE — 99232 SBSQ HOSP IP/OBS MODERATE 35: CPT | Performed by: HOSPITALIST

## 2017-10-01 PROCEDURE — 80048 BASIC METABOLIC PNL TOTAL CA: CPT

## 2017-10-01 PROCEDURE — 6370000000 HC RX 637 (ALT 250 FOR IP): Performed by: INTERNAL MEDICINE

## 2017-10-01 PROCEDURE — 99232 SBSQ HOSP IP/OBS MODERATE 35: CPT | Performed by: INTERNAL MEDICINE

## 2017-10-01 PROCEDURE — 6360000002 HC RX W HCPCS: Performed by: ANESTHESIOLOGY

## 2017-10-01 PROCEDURE — 85025 COMPLETE CBC W/AUTO DIFF WBC: CPT

## 2017-10-01 PROCEDURE — 82948 REAGENT STRIP/BLOOD GLUCOSE: CPT

## 2017-10-01 PROCEDURE — 6370000000 HC RX 637 (ALT 250 FOR IP): Performed by: ANESTHESIOLOGY

## 2017-10-01 PROCEDURE — 6370000000 HC RX 637 (ALT 250 FOR IP): Performed by: HOSPITALIST

## 2017-10-01 PROCEDURE — 2580000003 HC RX 258: Performed by: INTERNAL MEDICINE

## 2017-10-01 PROCEDURE — 6370000000 HC RX 637 (ALT 250 FOR IP): Performed by: NURSE PRACTITIONER

## 2017-10-01 PROCEDURE — 99223 1ST HOSP IP/OBS HIGH 75: CPT | Performed by: INTERNAL MEDICINE

## 2017-10-01 PROCEDURE — 2500000003 HC RX 250 WO HCPCS: Performed by: INTERNAL MEDICINE

## 2017-10-01 RX ORDER — BUMETANIDE 0.25 MG/ML
2 INJECTION, SOLUTION INTRAMUSCULAR; INTRAVENOUS 2 TIMES DAILY
Status: DISCONTINUED | OUTPATIENT
Start: 2017-10-01 | End: 2017-10-02

## 2017-10-01 RX ORDER — POTASSIUM CHLORIDE 20 MEQ/1
20 TABLET, EXTENDED RELEASE ORAL 2 TIMES DAILY WITH MEALS
Status: DISCONTINUED | OUTPATIENT
Start: 2017-10-01 | End: 2017-10-03

## 2017-10-01 RX ADMIN — BUMETANIDE 2 MG: 0.25 INJECTION, SOLUTION INTRAMUSCULAR; INTRAVENOUS at 18:21

## 2017-10-01 RX ADMIN — URSODIOL 300 MG: 300 CAPSULE ORAL at 20:21

## 2017-10-01 RX ADMIN — Medication 10 ML: at 20:26

## 2017-10-01 RX ADMIN — PIPERACILLIN SODIUM,TAZOBACTAM SODIUM 3.38 G: 3; .375 INJECTION, POWDER, FOR SOLUTION INTRAVENOUS at 14:59

## 2017-10-01 RX ADMIN — PIPERACILLIN SODIUM,TAZOBACTAM SODIUM 3.38 G: 3; .375 INJECTION, POWDER, FOR SOLUTION INTRAVENOUS at 22:10

## 2017-10-01 RX ADMIN — HEPARIN SODIUM 5000 UNITS: 5000 INJECTION, SOLUTION INTRAVENOUS; SUBCUTANEOUS at 23:52

## 2017-10-01 RX ADMIN — MICONAZOLE NITRATE: 2 POWDER TOPICAL at 12:16

## 2017-10-01 RX ADMIN — PIPERACILLIN SODIUM,TAZOBACTAM SODIUM 3.38 G: 3; .375 INJECTION, POWDER, FOR SOLUTION INTRAVENOUS at 05:28

## 2017-10-01 RX ADMIN — CINACALCET HYDROCHLORIDE 30 MG: 30 TABLET, COATED ORAL at 08:24

## 2017-10-01 RX ADMIN — LEVOTHYROXINE SODIUM 112 MCG: 112 TABLET ORAL at 07:05

## 2017-10-01 RX ADMIN — HEPARIN SODIUM 5000 UNITS: 5000 INJECTION, SOLUTION INTRAVENOUS; SUBCUTANEOUS at 08:25

## 2017-10-01 RX ADMIN — BUMETANIDE 1 MG: 0.25 INJECTION, SOLUTION INTRAMUSCULAR; INTRAVENOUS at 08:25

## 2017-10-01 RX ADMIN — ACETAMINOPHEN 650 MG: 325 TABLET ORAL at 08:25

## 2017-10-01 RX ADMIN — Medication 10 ML: at 08:41

## 2017-10-01 RX ADMIN — NYSTATIN 500000 UNITS: 100000 SUSPENSION ORAL at 20:21

## 2017-10-01 RX ADMIN — RIFAXIMIN 550 MG: 550 TABLET ORAL at 08:24

## 2017-10-01 RX ADMIN — METOPROLOL TARTRATE 25 MG: 25 TABLET ORAL at 20:21

## 2017-10-01 RX ADMIN — METOPROLOL TARTRATE 25 MG: 25 TABLET ORAL at 08:25

## 2017-10-01 RX ADMIN — NYSTATIN 500000 UNITS: 100000 SUSPENSION ORAL at 08:24

## 2017-10-01 RX ADMIN — FAMOTIDINE 20 MG: 20 TABLET, FILM COATED ORAL at 08:24

## 2017-10-01 RX ADMIN — POTASSIUM CHLORIDE 20 MEQ: 1500 TABLET, EXTENDED RELEASE ORAL at 20:21

## 2017-10-01 RX ADMIN — MICONAZOLE NITRATE: 2 POWDER TOPICAL at 20:21

## 2017-10-01 RX ADMIN — VITAMIN D, TAB 1000IU (100/BT) 5000 UNITS: 25 TAB at 08:24

## 2017-10-01 RX ADMIN — HEPARIN SODIUM 5000 UNITS: 5000 INJECTION, SOLUTION INTRAVENOUS; SUBCUTANEOUS at 18:21

## 2017-10-01 RX ADMIN — HEPARIN SODIUM 5000 UNITS: 5000 INJECTION, SOLUTION INTRAVENOUS; SUBCUTANEOUS at 00:39

## 2017-10-01 RX ADMIN — POTASSIUM CHLORIDE 20 MEQ: 1500 TABLET, EXTENDED RELEASE ORAL at 14:59

## 2017-10-01 RX ADMIN — RIFAXIMIN 550 MG: 550 TABLET ORAL at 20:21

## 2017-10-01 RX ADMIN — Medication 3 MG: at 20:27

## 2017-10-01 RX ADMIN — AMLODIPINE BESYLATE 10 MG: 10 TABLET ORAL at 08:24

## 2017-10-01 RX ADMIN — URSODIOL 300 MG: 300 CAPSULE ORAL at 08:25

## 2017-10-01 RX ADMIN — ONDANSETRON 4 MG: 2 INJECTION INTRAMUSCULAR; INTRAVENOUS at 17:48

## 2017-10-01 ASSESSMENT — PAIN DESCRIPTION - PAIN TYPE: TYPE: ACUTE PAIN

## 2017-10-01 ASSESSMENT — PAIN SCALES - GENERAL
PAINLEVEL_OUTOF10: 0
PAINLEVEL_OUTOF10: 2
PAINLEVEL_OUTOF10: 0
PAINLEVEL_OUTOF10: 1

## 2017-10-01 ASSESSMENT — PAIN DESCRIPTION - LOCATION: LOCATION: BACK

## 2017-10-01 NOTE — PROGRESS NOTES
states she feels better, no chest pain or SOB    All other ROS negative except noted in HPI    Past, Family, Social History unchanged from admission. Diet:  DIET DYSPHAGIA I PUREED; Nectar Thick; No Drinking Straw  Dietary Nutrition Supplements: Frozen Oral Supplement    Medications:  Scheduled Meds:   metoprolol tartrate  25 mg Oral BID    bumetanide  1 mg Intravenous BID    piperacillin-tazobactam  3.375 g Intravenous Q8H    miconazole   Topical BID    nystatin  5 mL Oral 4x Daily    potassium replacement protocol   Other RX Placeholder    heparin (porcine)  5,000 Units Subcutaneous 3 times per day    vitamin D  5,000 Units Oral Daily    rifaximin  550 mg Oral BID    levothyroxine  112 mcg Oral Daily    ursodiol  300 mg Oral BID    cinacalcet  30 mg Oral Daily    famotidine  20 mg Oral Daily    amLODIPine  10 mg Oral Daily    nicotine  1 patch Transdermal Daily    phosphorus replacement protocol   Other RX Placeholder    sodium chloride flush  10 mL Intravenous 2 times per day     Continuous Infusions:     PRN Meds:melatonin, haloperidol lactate, acetaminophen, magnesium hydroxide, ondansetron, potassium chloride, magnesium sulfate, labetalol, sodium chloride flush    Objective:  CBC:   Recent Labs      09/30/17   0554  10/01/17   0823   WBC  14.6*  13.5*   HGB  11.3*  10.1*   PLT  67*  50*     BMP:    Recent Labs      09/29/17   0521  09/29/17 2027 09/30/17   0554  10/01/17   0502   NA  145   --   146*  145   K  3.3*  4.0  3.9  3.6   CL  106   --   108  109   CO2  24   --   22*  23   BUN  66*   --   61*  61*   CREATININE  1.8*   --   1.7*  1.4*   GLUCOSE  91   --   137*  117*     Calcium:  Recent Labs      10/01/17   0502   CALCIUM  10.8*     Ionized Calcium:No results for input(s): IONCA in the last 72 hours. Magnesium:  Recent Labs      09/30/17   0554   MG  1.9     Phosphorus:No results for input(s): PHOS in the last 72 hours.   BNP:No results for input(s): BNP in the last 72 somewhat decreased in size. No focal mass or intrahepatic ductal dilatation is seen. There are multiple tortuous vessels at the new hepatis. There is possible recannulated the umbilicus vein. There are multiple hypoechoic masses at the new hepatis indicating periportal pericaval adenopathy. Some of these are as large as 9.7 cm. The gallbladder shows no evidence of stones there is gallbladder wall thickening and pericholecystic edema and sludge within the gallbladder. Common bile duct is not enlarged. 1. Cirrhotic liver with possible recannulated emboli: Vein. 2. Marked periportal pericaval adenopathy. Follow-up with CT scan is recommended. 3. Gallbladder sludge with gallbladder wall thickening and pericholecystic edema. Acalculous cholecystitis is a possibility. **This report has been created using voice recognition software. It may contain minor errors which are inherent in voice recognition technology. ** Final report electronically signed by Dr. India Yost on 9/15/2017 7:23 PM    Us Gallbladder Ruq    Result Date: 9/15/2017  PROCEDURE: US GALLBLADDER RUQ, US LIVER CLINICAL INFORMATION: CIRRHOSIS, HEPATITIS,  . COMPARISON: No prior study. TECHNIQUE: Grayscale and color Doppler ultrasound FINDINGS: Exam is somewhat limited due to patient condition. Liver Liver is somewhat decreased in size. No focal mass or intrahepatic ductal dilatation is seen. There are multiple tortuous vessels at the new hepatis. There is possible recannulated the umbilicus vein. There are multiple hypoechoic masses at the new hepatis indicating periportal pericaval adenopathy. Some of these are as large as 9.7 cm. The gallbladder shows no evidence of stones there is gallbladder wall thickening and pericholecystic edema and sludge within the gallbladder. Common bile duct is not enlarged. 1. Cirrhotic liver with possible recannulated emboli: Vein. 2. Marked periportal pericaval adenopathy.  Follow-up with CT scan is recommended. 3. Gallbladder sludge with gallbladder wall thickening and pericholecystic edema. Acalculous cholecystitis is a possibility. **This report has been created using voice recognition software. It may contain minor errors which are inherent in voice recognition technology. ** Final report electronically signed by Dr. Janis Sal on 9/15/2017 7:23 PM    Us Renal Limited    Result Date: 9/22/2017  PROCEDURE: US RENAL LIMITED CLINICAL INFORMATION: Acute kidney injury TECHNIQUE: Ultrasound of the kidneys and urinary bladder was performed. Grayscale and color Doppler images were obtained. COMPARISON: None FINDINGS: The right kidney measures 8.9 x 4.8 x 3.8 cm and the left kidney measures 9.0 x 4.9 x 4.1 cm. Renal cortical thickness is normal bilaterally. The renal cortex on the right side is slightly echogenic. There is no evidence of hydronephrosis, calculi or intrarenal mass on either side. The sonographer was unable to obtain arcuate resistive indices. There is a Aguilar catheter in the urinary bladder which cannot be evaluated on the current study. A right-sided pleural effusion is incompletely visualized on the current study. 1. Echogenic right renal cortex, possibly secondary to medical renal disease, but otherwise unremarkable renal ultrasound. 2. Aguilar catheter in a nondistended urinary bladder. Final report electronically signed by Dr. Mike Angel on 9/22/2017 8:24 AM    Xr Chest Portable    Result Date: 9/29/2017  PROCEDURE: XR CHEST PORTABLE CLINICAL INFORMATION: cough, . COMPARISON: Chest x-ray AP portable semiupright September 28, 2017. TECHNIQUE: AP upright view of the chest. FINDINGS: There is left retrocardiac density which is unchanged. Left diaphragm is still seen. The left lateral costophrenic angle remains obscured. The opacity in the right lower lung is slightly decreased. There is a scalloped appearance to the medial aspect of the density suggesting perhaps loculation of pleural fluid. Overall the density in the medial right lower lung has cleared to moderate extent. The cardiomediastinal structures are not changed in the interim. There is no interval pneumothorax. The bones are stable. 1.  The left lower lobe atelectasis/infiltrate, and likely left pleural effusion are unchanged pleural effusion. 2.  Findings suggest decrease in atelectasis infiltrate in the right lower lung and possibly redistribution of pleural fluid on the right. **This report has been created using voice recognition software. It may contain minor errors which are inherent in voice recognition technology. ** Final report electronically signed by Dr. Valentin Isaacs on 9/29/2017 4:32 PM    Xr Chest Portable    Result Date: 9/28/2017  PROCEDURE: XR CHEST PORTABLE CLINICAL INFORMATION: Cough; questionable aspiration. COMPARISON: 9/21/2017. TECHNIQUE: AP portable chest radiograph performed. FINDINGS: POSTSURGICAL CHANGES: None. LINES/TUBES/MECHANICAL DEVICES: None. TRACHEA/HEART/MEDIASTINUM/HILUM: 1. New obscuration of a portion of the right cardiomediastinal silhouette. 2. Stable mitral annular calcification. LUNG FIELDS: 1. There is new elevation of the right hemidiaphragm to the level of the right hilum. Underlying pleural fluid and atelectasis/infiltrates cannot be excluded. The right upper lung zones are clear. 2. There is new opacification obscuring the retrocardiac left lung base with the differential including pleural fluid and atelectasis/infiltrates. The left lung fields are otherwise clear. OTHER: None. PNEUMOTHORAX:  None. OSSEOUS STRUCTURES: 1. No acute osseous abnormality. 2. Generalized osteopenia. 1. There is new elevation of the right hemidiaphragm to the level of the right hilum. Underlying pleural fluid and atelectasis/infiltrates cannot be excluded. The right upper lung zones are clear.  2. There is new opacification obscuring the retrocardiac left lung base with the differential including pleural fluid and atelectasis/infiltrates. The left lung fields are otherwise clear. **This report has been created using voice recognition software. It may contain minor errors which are inherent in voice recognition technology. ** Final report electronically signed by Dr. Avi Alberto on 9/28/2017 8:16 AM    Xr Chest Portable    Result Date: 9/21/2017  PROCEDURE: XR CHEST PORTABLE CLINICAL INFORMATION: possible pneumonia, COMPARISON: 9/19/2017 TECHNIQUE: A single mobile view of the chest was obtained. 1. Mild cardiomegaly Moderate calcification of the mitral annulus. 2. Mild bibasilar atelectasis/pneumonia. Overall appearance of chest slightly worse than prior. **This report has been created using voice recognition software. It may contain minor errors which are inherent in voice recognition technology. ** Final report electronically signed by Dr. Mynor Shin on 9/21/2017 2:25 PM    Xr Chest Portable    Result Date: 9/15/2017  PROCEDURE: XR CHEST PORTABLE CLINICAL INFORMATION: NG tube placement,  . COMPARISON: 9/15/2017 at 1740 TECHNIQUE: Portable supine FINDINGS: NG tube is been advanced and is well into the stomach. Distal tip is not included on the image. There are no other changes. NG tube is well into the stomach **This report has been created using voice recognition software. It may contain minor errors which are inherent in voice recognition technology. ** Final report electronically signed by Dr. Sharita Lemus on 9/15/2017 7:10 PM    Xr Chest Portable    Result Date: 9/15/2017  PROCEDURE: XR CHEST PORTABLE CLINICAL INFORMATION: NG placement,  . COMPARISON: 9/15/2017 TECHNIQUE: Portable supine FINDINGS: NG tube extends to the distal esophagus. It needs to be advanced 7 cm to be in the proximal stomach. There are no other changes. NG tube in the distal esophagus **This report has been created using voice recognition software. It may contain minor errors which are inherent in voice recognition technology. ** Final report electronically signed by Dr. Janis Sal on 9/15/2017 6:10 PM    Xr Chest Portable    Result Date: 9/15/2017  PROCEDURE: XR CHEST PORTABLE CLINICAL INFORMATION: AMS, . COMPARISON: PA and lateral chest x-ray December 5, 2015. TECHNIQUE: AP upright view of the chest. FINDINGS: The heart size remains mildly enlarged. The mediastinum is not widened. There is mildly increased density in the right lower lobe mostly in the right infrahilar region. There is obscuration to moderate degree the right lateral costophrenic angle. The rest of the lungs are clear. The pulmonary vascularity is normal. No suspicious osseous lesions are present. 1.  Findings consistent with small to moderate size right pleural effusion with underlying atelectasis. Also in the differential would be early infiltrate but this is less favored. Recommend a true PA and lateral chest x-ray preferably in the x-ray department if patient can tolerate. **This report has been created using voice recognition software. It may contain minor errors which are inherent in voice recognition technology. ** Final report electronically signed by Dr. Kamila Song on 9/15/2017 9:14 AM    Vl Carotid Bilateral    Result Date: 9/29/2017  PROCEDURE: Bilateral carotid ultrasound CLINICAL INFORMATION: Stroke COMPARISON: No prior study. TECHNIQUE: Grayscale, color flow and spectral waveform ultrasound images were obtained through the bilateral extracranial carotid and vertebral arteries. Peak systolic and end-diastolic velocities were measured. FINDINGS: The right common carotid artery appears grossly patent. There is no significant atherosclerotic plaque demonstrated at the right carotid bifurcation. The right ICA to CCA ratio is within normal limits. There is antegrade flow within the right vertebral artery. The left common carotid artery appears grossly patent. There is no significant atherosclerotic plaque demonstrated at the left carotid bifurcation.  The left ICA T2-weighted turbo spin-echo, fat-saturated T2-weighted fluid attenuated inversion recovery, and proton density weighted gradient recall images were obtained of the  entire brain. Sagittal 3-D T1 weighted volume acquisition gradient recall images were also obtained, with multiplanar reconstructions. No IV contrast administered. FINDINGS: The global atrophy, mild, is again demonstrated. There are multiple foci of restricted diffusion demonstrated. These are most impressively involving the cortex of the left frontal lobe, nonconfluent, with some minimal involvement of the parafalcine left parietal lobe. There are also foci of restricted diffusion of the right frontal lobe and right occipital lobe. Small focus of restricted diffusion likely present in the cortex of the left occipital lobe also. Larger areas of restricted diffusion are present in the left cerebellar hemisphere. All of these have associated hyperintense T2 signal, though some are more impressive than others. Specifically, more impressive T2 signal abnormalities are present associated with the left cerebellar diffusion abnormalities than the supratentorial regions. Non of these have associated blood products though there is evidence of hemosiderin in the subependymal aspects of the right frontal lobe. Correlation of findings indicate multiple foci of subacute ischemic infarctions involving the anterior and posterior circulation distributions. This could represent watershed distribution in some areas, though not clearly defining watershed regions. Explanation could represent embolic phenomenon, though not strongly suggestive of sides. Hyperintense T2 abnormalities are present in the white matter of both cerebral hemispheres, confluent, symmetric. Minimal amounts of hyperintense T2 abnormality also present in the pontine white matter. Evidence of remote lacunar infarction of the right cerebellum.  There is suspicious for remote lacunar infarction of the left thalamus. No definite sequelae of infarction of the basal ganglia though this is difficult to confirm. No definite gliosis involving the cerebral cortex of either hemisphere. Limited detail of the cervical canal and spinal cord and sella show no findings of acute abnormality. Some canal stenosis at C4-5 evident. The paranasal sinuses show no concerning characteristics, though there is likely mucosal retention cyst of the left maxillary sinus and minimal mucosal thickening of the left more than right ethmoid air cells and sphenoid sinuses. There is nonspecific fluid signal in the left mastoid air cells, and middle ear cavities been clear bilaterally. Vascular structures grossly show nothing concerning. SUBACUTE ISCHEMIC INFARCTIONS INVOLVING BOTH ANTERIOR AND POSTERIOR CIRCULATION DISTRIBUTIONS. THESE ARE SMALL FOCI, CLUSTERED IN THE LEFT FRONTAL LOBE, WITH LESS IMPRESSIVE INVOLVEMENT OF THE RIGHT FRONTAL LOBE AND BILATERAL OCCIPITAL LOBES AND THE LEFT PARIETAL LOBE. SUBACUTE ISCHEMIC INFARCTIONS OF LIKELY DIFFERENT AGE IN THE LEFT CEREBELLUM. NONE HAVE ASSOCIATED BLOOD PRODUCTS. REMOTE MICROHEMORRHAGE IN THE RIGHT FRONTAL LOBE. MODERATE SMALL VESSEL DISEASE SEQUELAE. MILD PARANASAL SINUS DISEASE. FINDINGS DISCUSSED WITH THE [facility] NURSING STAFF AT THE TIME OF THE INTERPRETATION, 1556 HOURS ON 28 SEPTEMBER 2017. **This report has been created using voice recognition software. It may contain minor errors which are inherent in voice recognition technology. ** Final report electronically signed by Dr. Franki Dior on 9/28/2017 4:01 PM    Fluoroscopy Modified Barium Swallow With Video    Result Date: 9/29/2017  PROCEDURE: FL MODIFIED BARIUM SWALLOW W VIDEO CLINICAL INFORMATION: Dysphasia TECHNIQUE: Fluoroscopy was provided for modified barium swallowing study performed by speech therapy. With the patient in the lateral projection, swallowing mechanism was evaluated using barium of various consistencies.  The radiologist was present for the entire examination. One spot image was obtained. Total fluoroscopy time 1.2 minutes. Speech therapist: Lyudmila Handler COMPARISON: None. FINDINGS: Oral, pharyngeal and esophageal structures appear normal. There is laryngeal penetration of thin barium. No aspiration is identified. 1. Laryngeal penetration of thin barium without evidence of aspiration. 2. Additional recommendations from the speech therapist will follow. **This report has been created using voice recognition software. It may contain minor errors which are inherent in voice recognition technology. ** Final report electronically signed by Dr. Abdelrahman Swanson on 9/29/2017 9:58 AM    Nm Parathyroid W Spect/ct    Result Date: 9/26/2017  PROCEDURE: NM PARATHYROID PLANAR W/SPECT & CT CLINICAL INFORMATION: Hypercalcemia. Radiopharmaceutical: 27.8 mCi technetium 99m sestamibi, intravenously. TECHNIQUE: Anterior planar images of the neck were obtained immediately and at 2 and 4 hours following radiopharmaceutical administration. Additional SPECT imaging was performed at 2 and 4 hours with transaxial, coronal and sagittal projections. Low-dose  CT was acquired for attenuation correction and localization only. COMPARISON: None FINDINGS: Immediate images show homogeneous distribution of radiotracer throughout the thyroid gland. There is mild diffuse radiotracer accumulation in the gland at 2 hours with minimal radiotracer at 4 hours. There are no discrete foci of abnormal activity. Poor washout of radiotracer from the thyroid gland without evidence of parathyroid adenoma.  Final report electronically signed by Dr. Abdelrahman Swanson on 9/26/2017 2:21 PM        Physical Exam:  Vitals: /60  Pulse 73  Temp 97.5 °F (36.4 °C) (Oral)   Resp 17  Ht 5' 1\" (1.549 m)  Wt 173 lb (78.5 kg)  SpO2 93%  BMI 32.69 kg/m2  24 hour intake/output:    Intake/Output Summary (Last 24 hours) at 10/01/17 1128  Last data filed at 10/01/17 0841   Gross per 24 hour   Intake             1148 ml   Output             1350 ml   Net             -202 ml     Last 3 weights: Wt Readings from Last 3 Encounters:   10/01/17 173 lb (78.5 kg)   02/16/17 131 lb (59.4 kg)   12/10/15 122 lb (55.3 kg)       General appearance - pleasantly confused appears to be in no acute distress  HEENT: Atraumatic normocephalic, no JVD. Trachea midline.    Chest - Bilateral air entry, no wheezes, crackles or rhonchi  Cardiovascular - S1S2 RRR, no murmurs or gallops  Abdomen - Soft non tender non distended, normoactive bowel sounds   Integumentary - Skin color, texture, turgor normal. No Rashes or lesions  Musculoskeletal -Full ROM, No clubbing or cyanosis  Extremities:  peripheral edema    DVT prophylaxis: [] Lovenox                                 [] SCDs                                 [x] SQ Heparin                                 [] Encourage ambulation           [] Already on Anticoagulation               Electronically signed by Cami Hagan MD on 10/1/2017 at 11:28 AM    Rounding Hospitalist  [phone]

## 2017-10-01 NOTE — PLAN OF CARE
Problem: Falls - Risk of  Goal: Absence of falls  Outcome: Ongoing  No falls this shift. Bed/chair alarm. Pt is dependent for transfers. Transferred pt to chair with maxi move    Problem: Nutrition  Goal: Optimal nutrition therapy  Outcome: Not Met This Shift  Appetite very poot. Pt c/o nausea gave zofran IV. Problem: Risk for Impaired Skin Integrity  Goal: Tissue integrity - skin and mucous membranes  Structural intactness and normal physiological function of skin and  mucous membranes. Outcome: Ongoing  Redness and opens areas in jovani area. Nystatin powder applied after each incontinence. Problem: Discharge Planning:  Goal: Participates in care planning  Participates in care planning   Outcome: Ongoing  Continues will need rehab at [facility]    Problem: Airway Clearance - Ineffective:  Goal: Ability to maintain a clear airway will improve  Ability to maintain a clear airway will improve   Outcome: Ongoing  Lungs clear with cough. IV bumex increase to 2 mg IV BID    Comments:   Care plan reviewed with patient. Patient verbalize understanding of the plan of care and contribute to goal setting.

## 2017-10-01 NOTE — PLAN OF CARE
Impaired:  Goal: Mental status will be restored to baseline  Mental status will be restored to baseline   Outcome: Ongoing  Patient is alert and oriented to name and birthday. Intermittent confusion. Problem: Nutrition Deficit:  Goal: Ability to achieve adequate nutritional intake will improve  Ability to achieve adequate nutritional intake will improve   Outcome: Ongoing  Poor intake. Problem: Musculor/Skeletal Functional Status  Goal: Highest potential functional level  Outcome: Ongoing  Patient has limited movement in all extremities. Problem: Pain:  Goal: Pain level will decrease  Pain level will decrease   Outcome: Ongoing  Patient rated pain a 3/10 in the head. Patient denied PRN Tylenol. Comments:   Care plan reviewed with patient. Patient verbalized understanding of the plan of care and contributed to goal setting.

## 2017-10-01 NOTE — PROGRESS NOTES
[facility]         Renal Inpatient Follow-Up note         10/1/2017 11:35 AM    Pt Name:   Fredrick Ortiz:   [id number]  Attending:   Sagrario Landa MD    Chief Complaint : Kacie Morales is a 68 y.o. female being followed by nephrology for Pilar    Interval History :   Patient seen and examined by me. No distress  Feels ok. No complaints. No cp or SOB. Appears slightly confused     Scheduled Medications :    metoprolol tartrate  25 mg Oral BID    bumetanide  1 mg Intravenous BID    piperacillin-tazobactam  3.375 g Intravenous Q8H    miconazole   Topical BID    nystatin  5 mL Oral 4x Daily    potassium replacement protocol   Other RX Placeholder    heparin (porcine)  5,000 Units Subcutaneous 3 times per day    vitamin D  5,000 Units Oral Daily    rifaximin  550 mg Oral BID    levothyroxine  112 mcg Oral Daily    ursodiol  300 mg Oral BID    cinacalcet  30 mg Oral Daily    famotidine  20 mg Oral Daily    amLODIPine  10 mg Oral Daily    nicotine  1 patch Transdermal Daily    phosphorus replacement protocol   Other RX Placeholder    sodium chloride flush  10 mL Intravenous 2 times per day          Vitals :  /60  Pulse 73  Temp 97.5 °F (36.4 °C) (Oral)   Resp 17  Ht 5' 1\" (1.549 m)  Wt 173 lb (78.5 kg)  SpO2 93%  BMI 32.69 kg/m2    24HR INTAKE/OUTPUT:      Intake/Output Summary (Last 24 hours) at 10/01/17 1135  Last data filed at 10/01/17 0841   Gross per 24 hour   Intake             1148 ml   Output             1350 ml   Net             -202 ml     Last 3 weights  Wt Readings from Last 3 Encounters:   10/01/17 173 lb (78.5 kg)   02/16/17 131 lb (59.4 kg)   12/10/15 122 lb (55.3 kg)           Physical Exam :  General Appearance:  Well developed.  No distress  Mouth/Throat:  Oral mucosa moist  Neck:  Supple, no JVD  Lungs:  Breath sounds: clear  Heart[de-identified]  S1,S2 heard  Abdomen:  Soft, non - tender  Musculoskeletal:  Edema - 3+ edema B/l         Last 3 CBC Recent Labs      09/30/17   0554  10/01/17   0823   WBC  14.6*  13.5*   RBC  4.43  4.01*   HGB  11.3*  10.1*   HCT  35.1*  31.0*   PLT  67*  50*     Last 3 CMP  Recent Labs      09/29/17   0521  09/29/17 2027 09/30/17   0554  10/01/17   0502   NA  145   --   146*  145   K  3.3*  4.0  3.9  3.6   CL  106   --   108  109   CO2  24   --   22*  23   BUN  66*   --   61*  61*   CREATININE  1.8*   --   1.7*  1.4*   CALCIUM  9.6   --   9.9  10.8*             Assessment / Plan   Renal - AK_ thought to be from pre-renal, has been receiving IVF for several days, only marginal improvement  · Very fluid overloaded at this time - as per documentation she is + 20 L  · Creatinine getting better with diuresis  · Not a lot of diuresis, will increase bumex to 2 mg IV BID. BMP in AM    Mild hypernatremia - 2/2 IVF, appears better  Hypokalemia - resolved. Will give 20 kcl BID  Mild acidosis- better   Fluid overload - stop IVF and start diuretics  Hypercalcemia - follow for now  Hepatic cirrhosis  D/W patient, nurse. meds reviewed    ALICJA Haro D.  [facility].

## 2017-10-02 LAB
AMMONIA: 21 UMOL/L (ref 11–60)
ANION GAP SERPL CALCULATED.3IONS-SCNC: 17 MEQ/L (ref 8–16)
ANISOCYTOSIS: ABNORMAL
BASOPHILS # BLD: 0 %
BASOPHILS ABSOLUTE: 0 THOU/MM3 (ref 0–0.1)
BUN BLDV-MCNC: 61 MG/DL (ref 7–22)
CALCIUM SERPL-MCNC: 11 MG/DL (ref 8.5–10.5)
CHLORIDE BLD-SCNC: 107 MEQ/L (ref 98–111)
CO2: 22 MEQ/L (ref 23–33)
CREAT SERPL-MCNC: 1.6 MG/DL (ref 0.4–1.2)
EOSINOPHIL # BLD: 0.1 %
EOSINOPHILS ABSOLUTE: 0 THOU/MM3 (ref 0–0.4)
GFR SERPL CREATININE-BSD FRML MDRD: 31 ML/MIN/1.73M2
GLUCOSE BLD-MCNC: 119 MG/DL (ref 70–108)
HCT VFR BLD CALC: 34.9 % (ref 37–47)
HEMOGLOBIN: 11 GM/DL (ref 12–16)
HYPOCHROMIA: ABNORMAL
LYMPHOCYTES # BLD: 2.4 %
LYMPHOCYTES ABSOLUTE: 0.2 THOU/MM3 (ref 1–4.8)
MAGNESIUM: 1.8 MG/DL (ref 1.6–2.4)
MCH RBC QN AUTO: 25.5 PG (ref 27–31)
MCHC RBC AUTO-ENTMCNC: 31.6 GM/DL (ref 33–37)
MCV RBC AUTO: 80.6 FL (ref 81–99)
MICROCYTES: ABNORMAL
MONOCYTES # BLD: 6.5 %
MONOCYTES ABSOLUTE: 0.6 THOU/MM3 (ref 0.4–1.3)
NUCLEATED RED BLOOD CELLS: 0 /100 WBC
PDW BLD-RTO: 24.8 % (ref 11.5–14.5)
PLATELET # BLD: 40 THOU/MM3 (ref 130–400)
PMV BLD AUTO: 9.7 MCM (ref 7.4–10.4)
POTASSIUM SERPL-SCNC: 3.8 MEQ/L (ref 3.5–5.2)
PTH INTACT: 144.4 PG/ML (ref 15–65)
RBC # BLD: 4.33 MILL/MM3 (ref 4.2–5.4)
RBC # BLD: NORMAL 10*6/UL
SEG NEUTROPHILS: 91 %
SEGMENTED NEUTROPHILS ABSOLUTE COUNT: 8.3 THOU/MM3 (ref 1.8–7.7)
SODIUM BLD-SCNC: 146 MEQ/L (ref 135–145)
VITAMIN D 25-HYDROXY: 13 NG/ML (ref 30–100)
WBC # BLD: 9.1 THOU/MM3 (ref 4.8–10.8)

## 2017-10-02 PROCEDURE — 6360000002 HC RX W HCPCS: Performed by: INTERNAL MEDICINE

## 2017-10-02 PROCEDURE — 82306 VITAMIN D 25 HYDROXY: CPT

## 2017-10-02 PROCEDURE — 2500000003 HC RX 250 WO HCPCS: Performed by: INTERNAL MEDICINE

## 2017-10-02 PROCEDURE — 6370000000 HC RX 637 (ALT 250 FOR IP): Performed by: NURSE PRACTITIONER

## 2017-10-02 PROCEDURE — 2580000003 HC RX 258: Performed by: NURSE PRACTITIONER

## 2017-10-02 PROCEDURE — 80048 BASIC METABOLIC PNL TOTAL CA: CPT

## 2017-10-02 PROCEDURE — 97532 HC COGNITIVE THERAPY 15 MIN: CPT

## 2017-10-02 PROCEDURE — 85025 COMPLETE CBC W/AUTO DIFF WBC: CPT

## 2017-10-02 PROCEDURE — 2580000003 HC RX 258: Performed by: INTERNAL MEDICINE

## 2017-10-02 PROCEDURE — 83735 ASSAY OF MAGNESIUM: CPT

## 2017-10-02 PROCEDURE — 6370000000 HC RX 637 (ALT 250 FOR IP): Performed by: FAMILY MEDICINE

## 2017-10-02 PROCEDURE — 97530 THERAPEUTIC ACTIVITIES: CPT

## 2017-10-02 PROCEDURE — 99232 SBSQ HOSP IP/OBS MODERATE 35: CPT | Performed by: INTERNAL MEDICINE

## 2017-10-02 PROCEDURE — 6370000000 HC RX 637 (ALT 250 FOR IP): Performed by: ANESTHESIOLOGY

## 2017-10-02 PROCEDURE — 1210000002 HC MED SURG R&B - NEUROSCIENCE

## 2017-10-02 PROCEDURE — 6370000000 HC RX 637 (ALT 250 FOR IP): Performed by: INTERNAL MEDICINE

## 2017-10-02 PROCEDURE — 36415 COLL VENOUS BLD VENIPUNCTURE: CPT

## 2017-10-02 PROCEDURE — 99232 SBSQ HOSP IP/OBS MODERATE 35: CPT | Performed by: NURSE PRACTITIONER

## 2017-10-02 PROCEDURE — 82140 ASSAY OF AMMONIA: CPT

## 2017-10-02 PROCEDURE — 97110 THERAPEUTIC EXERCISES: CPT

## 2017-10-02 PROCEDURE — 83970 ASSAY OF PARATHORMONE: CPT

## 2017-10-02 PROCEDURE — 99232 SBSQ HOSP IP/OBS MODERATE 35: CPT | Performed by: HOSPITALIST

## 2017-10-02 PROCEDURE — 6370000000 HC RX 637 (ALT 250 FOR IP): Performed by: HOSPITALIST

## 2017-10-02 RX ORDER — AMOXICILLIN AND CLAVULANATE POTASSIUM 500; 125 MG/1; MG/1
1 TABLET, FILM COATED ORAL EVERY 12 HOURS SCHEDULED
Status: DISCONTINUED | OUTPATIENT
Start: 2017-10-02 | End: 2017-10-05 | Stop reason: HOSPADM

## 2017-10-02 RX ORDER — BUMETANIDE 0.25 MG/ML
1 INJECTION, SOLUTION INTRAMUSCULAR; INTRAVENOUS EVERY 8 HOURS
Status: DISCONTINUED | OUTPATIENT
Start: 2017-10-02 | End: 2017-10-03

## 2017-10-02 RX ADMIN — MICONAZOLE NITRATE: 2 POWDER TOPICAL at 20:16

## 2017-10-02 RX ADMIN — CHLOROTHIAZIDE SODIUM 500 MG: 500 INJECTION, POWDER, LYOPHILIZED, FOR SOLUTION INTRAVENOUS at 12:27

## 2017-10-02 RX ADMIN — Medication 3 MG: at 20:18

## 2017-10-02 RX ADMIN — Medication 10 ML: at 20:18

## 2017-10-02 RX ADMIN — LEVOTHYROXINE SODIUM 112 MCG: 112 TABLET ORAL at 08:52

## 2017-10-02 RX ADMIN — BUMETANIDE 1 MG: 0.25 INJECTION, SOLUTION INTRAMUSCULAR; INTRAVENOUS at 13:09

## 2017-10-02 RX ADMIN — PIPERACILLIN SODIUM,TAZOBACTAM SODIUM 3.38 G: 3; .375 INJECTION, POWDER, FOR SOLUTION INTRAVENOUS at 14:19

## 2017-10-02 RX ADMIN — MICONAZOLE NITRATE: 2 POWDER TOPICAL at 08:52

## 2017-10-02 RX ADMIN — PIPERACILLIN SODIUM,TAZOBACTAM SODIUM 3.38 G: 3; .375 INJECTION, POWDER, FOR SOLUTION INTRAVENOUS at 05:18

## 2017-10-02 RX ADMIN — URSODIOL 300 MG: 300 CAPSULE ORAL at 08:53

## 2017-10-02 RX ADMIN — BUMETANIDE 1 MG: 0.25 INJECTION, SOLUTION INTRAMUSCULAR; INTRAVENOUS at 20:16

## 2017-10-02 RX ADMIN — NYSTATIN 500000 UNITS: 100000 SUSPENSION ORAL at 20:16

## 2017-10-02 RX ADMIN — NYSTATIN 500000 UNITS: 100000 SUSPENSION ORAL at 17:30

## 2017-10-02 RX ADMIN — URSODIOL 300 MG: 300 CAPSULE ORAL at 20:16

## 2017-10-02 RX ADMIN — METOPROLOL TARTRATE 25 MG: 25 TABLET ORAL at 08:52

## 2017-10-02 RX ADMIN — METOPROLOL TARTRATE 25 MG: 25 TABLET ORAL at 20:15

## 2017-10-02 RX ADMIN — VITAMIN D, TAB 1000IU (100/BT) 5000 UNITS: 25 TAB at 08:53

## 2017-10-02 RX ADMIN — RIFAXIMIN 550 MG: 550 TABLET ORAL at 20:16

## 2017-10-02 RX ADMIN — FAMOTIDINE 20 MG: 20 TABLET, FILM COATED ORAL at 08:52

## 2017-10-02 RX ADMIN — NYSTATIN 500000 UNITS: 100000 SUSPENSION ORAL at 14:00

## 2017-10-02 RX ADMIN — Medication 10 ML: at 13:23

## 2017-10-02 RX ADMIN — AMOXICILLIN AND CLAVULANATE POTASSIUM 1 TABLET: 500; 125 TABLET, FILM COATED ORAL at 21:46

## 2017-10-02 RX ADMIN — RIFAXIMIN 550 MG: 550 TABLET ORAL at 08:53

## 2017-10-02 RX ADMIN — NYSTATIN 500000 UNITS: 100000 SUSPENSION ORAL at 08:53

## 2017-10-02 RX ADMIN — POTASSIUM CHLORIDE 20 MEQ: 1500 TABLET, EXTENDED RELEASE ORAL at 17:30

## 2017-10-02 RX ADMIN — POTASSIUM CHLORIDE 20 MEQ: 1500 TABLET, EXTENDED RELEASE ORAL at 08:53

## 2017-10-02 ASSESSMENT — PAIN SCALES - GENERAL
PAINLEVEL_OUTOF10: 0

## 2017-10-02 NOTE — PROGRESS NOTES
assist to scoot to edge of bed )  Sit to Supine: Dependent/Total (at trunk and LEs and then dependent x 3 to boost up in to bed )    Transfers  Sit to Stand: Maximum Assistance (x2, pt not tolerating any wbing through LEs took several attempts )    Balance  Comments: pt sat edge of bed for ~ 8 min pt needed CGA to mod assist due to post lean pt needed max cues to come forward and hold midline balance        Exercises:  Exercises  Comments: completed ex for increased strength pt completed supine AA to passive range of motion pt completed ankle pumps, heelslides, hip abd/add she assisted with less than 25% of the time with max cues to follow directions,          Activity Tolerance:  Activity Tolerance: Patient limited by cognitive status    Assessment: Body structures, Functions, Activity limitations: Decreased functional mobility , Decreased balance, Decreased safe awareness, Decreased cognition, Decreased endurance, Decreased strength  Assessment: pt didnt tolerate session well she had a hard time following directions and with attempts of sit to stand she didnt even attempt to wb through LEs, pt has a decline in status from initial eval and would benefit from cont therapy to work on strength, balance and increased indepenence with functional mobility   Prognosis: Fair  REQUIRES PT FOLLOW UP: Yes  Discharge Recommendations: Continue to assess pending progress, Subacute/Skilled Nursing Facility    Patient Education:  Patient Education: bed mobility, transfers, use of call lt   Barriers to Learning: cognition    Equipment Recommendations: Other: cont to assess needs    Safety:  Type of devices:  All fall risk precautions in place, Gait belt, Nurse notified, Patient at risk for falls, Call light within reach, Chair alarm in place, Left in chair    Plan:  Times per week: 3-5X GM  Times per day: Daily  Specific instructions for Next Treatment: therex and mobility  Current Treatment Recommendations: Strengthening, Transfer Training, Endurance Training, Patient/Caregiver Education & Training, Equipment Evaluation, Education, & procurement, Pain Management, Home Exercise Program, Safety Education & Training, Stair training, Functional Mobility Training, Balance Training, Gait Training    Goals:  Patient goals : not stated    Short term goals  Time Frame for Short term goals: 1 week  Short term goal 1: bed mobility with SBA to get in/out of bed  Short term goal 2: transfer with SBA to get in/out of chairs  Short term goal 3: amb 75'x1 with AD and CGA to walk safely to bathroom    Long term goals  Time Frame for Long term goals : no LTGs set secondary to short ELOS    PT G-Codes  Functional Assessment Tool Used: professional judgement  Functional Limitation: Mobility: Walking and moving around  Mobility: Walking and Moving Around Current Status (): At least 40 percent but less than 60 percent impaired, limited or restricted  Mobility: Walking and Moving Around Goal Status ():  At least 1 percent but less than 20 percent impaired, limited or restricted

## 2017-10-02 NOTE — PROGRESS NOTES
10 mL Intravenous 2 times per day        melatonin, haloperidol lactate, acetaminophen, magnesium hydroxide, ondansetron, potassium chloride, magnesium sulfate, labetalol, sodium chloride flush      LABS:     CBC:   Recent Labs      09/30/17   0554  10/01/17   0823  10/02/17   0427   WBC  14.6*  13.5*  9.1   HGB  11.3*  10.1*  11.0*   PLT  67*  50*  40*     BMP:    Recent Labs      09/30/17   0554  10/01/17   0502  10/02/17   0709   NA  146*  145  146*   K  3.9  3.6  3.8   CL  108  109  107   CO2  22*  23  22*   BUN  61*  61*  61*   CREATININE  1.7*  1.4*  1.6*   GLUCOSE  137*  117*  119*     Calcium:  Recent Labs      10/02/17   0709   CALCIUM  11.0*     Ionized Calcium:No results for input(s): IONCA in the last 72 hours. Magnesium:  Recent Labs      10/02/17   0709   MG  1.8     Phosphorus:  No results for input(s): PHOS in the last 72 hours. BNP:No results for input(s): BNP in the last 72 hours. Glucose:  Recent Labs      10/01/17   1757   POCGLU  108     HgbA1C: No results for input(s): LABA1C in the last 72 hours. INR: No results for input(s): INR in the last 72 hours. Hepatic:   No results for input(s): ALKPHOS, ALT, AST, PROT, BILITOT, BILIDIR, LABALBU in the last 72 hours. Amylase and Lipase:No results for input(s): LACTA, AMYLASE in the last 72 hours. Lactic Acid: No results for input(s): LACTA in the last 72 hours. Troponin: No results for input(s): CKTOTAL, CKMB, TROPONINI in the last 72 hours. BNP: No results for input(s): BNP in the last 72 hours. CULTURES:   UA:   No results for input(s): SPECGRAV, PHUR, COLORU, CLARITYU, MUCUS, PROTEINU, BLOODU, RBCUA, WBCUA, BACTERIA, NITRU, GLUCOSEU, BILIRUBINUR, UROBILINOGEN, KETUA, LABCAST, LABCASTTY, AMORPHOS in the last 72 hours.     Invalid input(s): CRYSTALS  Micro:   Lab Results   Component Value Date    BC No growth-preliminary  No growth   09/21/2017         IMAGING:         Problem list of patient:     Patient Active Problem List   Diagnosis Code    GIST (gastrointestinal stromal tumor), non-malignant D21.4    Essential hypertension I10    Cirrhosis of liver with ascites ([facility] [address] K74.60    Cancer ([facility] [address] C80.1    Hypothyroidism E03.9    Dizzy spells R42    Fever R50.9    Epigastric pain R10.13    History of atrial fibrillation Z86.79    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    Hypertensive urgency I16.0    Urethrovaginal fistula N82.1    Pleural effusion J90    Acute hepatic encephalopathy K72.00    Thrombocytopenia (HCC) D69.6    Microcytic anemia D50.9    Hyperbilirubinemia E80.6    Hypokalemia E87.6    Urinary retention R33.9    Hypercalcemia, not POA E83.52    Acute renal failure with tubular necrosis (HCC) N17.0    Pneumonia, organism unspecified J18.9    BEN (acute kidney injury) ([facility] [address] N17.9    Dehydration E86.0    Acquired hypothyroidism E03.9    Hypovitaminosis D E55.9    Hepatic encephalopathy (HCC) K72.90    Hypothermia, not POA T68. XXXA    Sludge in gallbladder K82.8    Hyperparathyroidism ([facility] [address] E21.3    Bilateral edema of lower extremity O85.7    Metabolic acidosis X45.7    Severe malnutrition ([facility] [address] E43    Ac isch multifocal ant circ stroke (HCC) I63.529    Dysphagia R13.10    Iron deficiency anemia D50.9    Physical deconditioning R53.81    Hypernatremia E87.0         ASSESSMENT/PLAN   -Biliary Cirrhosis with encephalopathy: better. Acute and subacute stroke  Pneumonia suspected aspiration. on treatment  Ischemic stroke acute and subacute  Continue supportive care    Sukhdev Koch MD, FACP 10/2/2017 5:59 PM

## 2017-10-02 NOTE — PROGRESS NOTES
Nutrition Assessment    Type and Reason for Visit: Reassess    Nutrition Recommendations: Continue current diet as per SLP and MD. Recommend multivitamin and appetite stimulant. Malnutrition Assessment:  · Malnutrition Status: Meets the criteria for severe malnutrition  · Context: Acute illness or injury  · Findings of the 6 clinical characteristics of malnutrition (Minimum of 2 out of 6 clinical characteristics is required to make the diagnosis of moderate or severe Protein Calorie Malnutrition based on AND/ASPEN Guidelines):  1. Energy Intake-Less than or equal to 50%, greater than or equal to 5 days    2. Muscle Loss-Moderate muscle mass loss, Temples (temporalis muscle), Calf (gastrocnemius)    Nutrition Diagnosis:   · Problem: Severe malnutrition, in context of acute illness or injury  · Etiology: related to Difficulty swallowing     Signs and symptoms:  as evidenced by Diet history of poor intake, Intake 0-25%, Moderate muscle loss    Nutrition Assessment:  · Subjective Assessment: Patient with frequent bouts of confusion; patient seen and reports enjoyed breakfast today, intake ~50% of meals. Patient does not recall trying Magic cup supplement. SLP following- patient on dysphagia diet. Stool: 3 in the past 24 hours. Medication includes bumex.     · Wound Type: None  · Current Nutrition Therapies:  · Oral Diet Orders: Dysphagia 1 (Pureed), Nectar Thick, No Straws   · Oral Diet intake: 1-25%, 26-50%, 51-75%  · Oral Nutrition Supplement (ONS) Orders: Frozen Oral Supplement (Magic Cup TID- Raspberry)  · ONS intake: unable to assess  · Anthropometric Measures:  · Ht: 5' 1\" (154.9 cm)   · Current Body Wt: 164 lb 8 oz (74.6 kg) (+2,+4 pitting edema, on diuretic; nephrology ? accuracy of weight being down)  · Admission Body Wt: 138 lb 10.7 oz (62.9 kg) ((9/15))  · Usual Body Wt: 131 lb (59.4 kg) ((2/16/17) per hospital records)  · Ideal Body Wt: 105 lb (47.6 kg), % Ideal Body 156%  · Adjusted Body Wt: 119 lb 12 oz (54.3 kg),   · BMI Classification: BMI 30.0 - 34.9 Obese Class I  · Comparative Standards (Estimated Nutrition Needs):  · Estimated Daily Total Kcal: 9121-0829 kcal/day (25-30 kcal/kg based on adj. wt. of 54.3 kg)  · Estimated Daily Protein (g): 54-65 g/day    Estimated Intake vs Estimated Needs: Intake Less Than Needs    Nutrition Risk Level: High    Nutrition Interventions:   Continue current diet, Continue current ONS (continue diet as per SLP and MD)  Continued Inpatient Monitoring, Education Initiated (Encouraged adequate po intake of meals at best effort)    Nutrition Evaluation:   · Evaluation:  Progressing to goal (at times)   · Goals: Pt will consume 50% or more of meals during LOS    · Monitoring: Meal Intake, Supplement Intake, Diet Tolerance, Weight, Comparative Standards, Pertinent Labs, Chewing/Swallowing    See Adult Nutrition Doc Flowsheet for more detail.      Electronically signed by Jose Louie RD, LD on 10/2/17 at 10:07 AM    Contact Number: [phone]

## 2017-10-02 NOTE — PLAN OF CARE
Problem: Nutrition  Goal: Optimal nutrition therapy  Outcome: Ongoing  Nutrition Problem: Severe malnutrition, in context of acute illness or injury  Intervention: Food and/or Nutrient Delivery: Continue current diet, Continue current ONS (continue diet as per SLP and MD)  Nutritional Goals: Pt will consume 50% or more of meals during LOS

## 2017-10-02 NOTE — PLAN OF CARE
Problem: Falls - Risk of  Goal: Absence of falls  Outcome: Ongoing  Patient on fall precautions. Falling star magnet on door. Bed in lowest position. Fall band intact. Call light within reach at all times. Patient educated to not get up per self to reduce falls. Problem: Nutrition  Goal: Optimal nutrition therapy  Outcome: Ongoing  Patient receiving adequate nutrional intake through dysphagia I diet. Patient's appetite is sufficient. Problem: Risk for Impaired Skin Integrity  Goal: Tissue integrity - skin and mucous membranes  Structural intactness and normal physiological function of skin and  mucous membranes. Outcome: Ongoing  Patient continues to have excoriation on groin region and scattered bruising. Problem: Discharge Planning:  Goal: Participates in care planning  Participates in care planning   Outcome: Ongoing  Patient participates in care planning. Goal: Discharged to appropriate level of care  Discharged to appropriate level of care   Outcome: Ongoing  Patient plans to be [facility] to [facility] ([facility] Problem: Musculor/Skeletal Functional Status  Goal: Highest potential functional level  Outcome: Ongoing  Patient remains weak through upper and lower extremities. Comments:   Care plan reviewed with patient. Patient verbalizes understanding of the plan of care and contributes to goal setting.

## 2017-10-02 NOTE — PROGRESS NOTES
location, name of hospital and month given FO2. Stated  with mod cues. ASSESSMENT:  Assessment: [x]Progressing towards goals          []Not Progressing towards goals       Patient Tolerance of Treatment:   []Tolerated well [x]Tolerated fair []Required rest breaks []Fatigued  Plan for Next Session:  Monitor puree and nectar diet, naming tasks  Education:  Learner:  [x]Patient          []Significant Other          []Other  Education provided regarding:  [x]Goals and POC   []Diet and swallowing precautions    []Home Exercise Program  [x]Progress and/or discharge information  Method of Education:  [x]Discussion          []Demonstration          []Handout          []Other  Evaluation of Education:   []Verbalized understanding   []Demonstrates without assistance  [x]Demonstrates with assistance  [x]Needs further instruction     []No evidence of learning                  [x]No family present      Plan: [x]Continue with current plan of care    []Modify current plan of care as follows:    []Discharge patient    Discharge Location:    Services/Supervision Recommended:     [x]Patient continues to require treatment by a licensed therapist to address functional deficits as outlined in the established plan of care.     Lyudmila Lewis M.S., [id number]

## 2017-10-02 NOTE — CONSULTS
1559 10 mEq at 09/29/17 1559    magnesium sulfate 1 g in dextrose 5 % 100 mL IVPB  1 g Intravenous PRN Margarita Combs MD        nicotine (NICODERM CQ) 14 MG/24HR 1 patch  1 patch Transdermal Daily Margarita Combs MD   1 patch at 10/02/17 2236    phosphorus replacement protocol   Other RX Placeholder Margarita Combs MD        labetalol (NORMODYNE;TRANDATE) injection 10 mg  10 mg Intravenous Q4H PRN Margarita Combs MD   10 mg at 09/15/17 4980    sodium chloride flush 0.9 % injection 10 mL  10 mL Intravenous 2 times per day Chrissy L Mericle, CNP   10 mL at 10/01/17 2026    sodium chloride flush 0.9 % injection 10 mL  10 mL Intravenous PRN Chrissy L Mericle, CNP         Prior to Admission medications    Medication Sig Start Date End Date Taking? Authorizing Provider   NYSTATIN PO Take 5 mLs by mouth 4 times daily   Yes Historical Provider, MD   levothyroxine (SYNTHROID) 112 MCG tablet Take 112 mcg by mouth Daily   Yes Historical Provider, MD   omeprazole (PRILOSEC) 20 MG delayed release capsule Take 40 mg by mouth Daily   Yes Historical Provider, MD   ursodiol (ACTIGALL) 500 MG tablet Take 500 mg by mouth 2 times daily     Historical Provider, MD   ferrous sulfate (FEOSOL) 325 (65 FE) MG tablet Take 325 mg by mouth daily (with breakfast).     Historical Provider, MD   Scheduled Meds:   bumetanide  1 mg Intravenous Q8H    chlorothiazide (DIURIL) IVPB  500 mg Intravenous Once    potassium chloride  20 mEq Oral BID WC    metoprolol tartrate  25 mg Oral BID    piperacillin-tazobactam  3.375 g Intravenous Q8H    miconazole   Topical BID    nystatin  5 mL Oral 4x Daily    potassium replacement protocol   Other RX Placeholder    vitamin D  5,000 Units Oral Daily    rifaximin  550 mg Oral BID    levothyroxine  112 mcg Oral Daily    ursodiol  300 mg Oral BID    famotidine  20 mg Oral Daily    nicotine  1 patch Transdermal Daily    phosphorus replacement protocol   Other RX Placeholder    sodium chloride flush  10 mL Intravenous 2 times per day     Continuous Infusions:   PRN Meds:.melatonin, haloperidol lactate, acetaminophen, magnesium hydroxide, ondansetron, potassium chloride, magnesium sulfate, labetalol, sodium chloride flush    No Known Allergies  Family History   Problem Relation Age of Onset    Heart Disease Mother     Diabetes Mother    [de-identified] Arthritis Mother     High Blood Pressure Mother     Prostate Cancer Father     Arthritis Father     High Blood Pressure Father     Diabetes Sister     Cancer Sister      pancreatic    Heart Disease Maternal Grandfather     Cancer Sister      pancreatic    Heart Disease Sister     Diabetes Sister     High Blood Pressure Sister      Social History     Social History    Marital status:      Spouse name: N/A    Number of children: N/A    Years of education: N/A     Occupational History    retired      Social History Main Topics    Smoking status: Current Some Day Smoker     Packs/day: 0.25     Years: 42.00     Types: Cigarettes    Smokeless tobacco: Never Used    Alcohol use No    Drug use: No    Sexual activity: No     Other Topics Concern    Not on file     Social History Narrative     ROS:   Constitutional: Denies any recent wt change  Eyes:  Denies any blurring or double vision, no glaucoma  Ears/Nose/Mouth/Throat:  Denies any chronic sinus/rhinitis, nosebleeds or bleeding gums  Cardiovascular:  As described above.   Denies any orthopnea, paroxysmal nocturnal dyspnea or claudication  Respiratory:  Denies any frequent cough, wheezing or coughing up blood  Genitourinary:  Denies difficulty with urination and kidney stones  Gastrointestinal:  Denies any chronic problems with abdominal pain, nausea, vomiting or diarrhea  Musculoskeletal:  Denies any joint pain, back pain, or difficulty walking  Integumentary:  Denies any rash  Neurological:  + CVA  Endocrine:  Denies any history of Diabetes or thyroid disease  Hematologic/Lymphatic:  Easy bruising and bleeding tendencies     Labs:  CBC: Recent Labs      09/30/17   0554  10/01/17   0823  10/02/17   0427   WBC  14.6*  13.5*  9.1   HGB  11.3*  10.1*  11.0*   HCT  35.1*  31.0*  34.9*   MCV  79.3*  77.2*  80.6*   PLT  67*  50*  40*     BMP: Recent Labs      09/30/17   0554  10/01/17   0502  10/02/17   0709   NA  146*  145  146*   K  3.9  3.6  3.8   CL  108  109  107   CO2  22*  23  22*   BUN  61*  61*  61*   CREATININE  1.7*  1.4*  1.6*   MG  1.9   --   1.8     Accucheck Glucoses:   Recent Labs      10/01/17   1757   POCGLU  108     Cardiac Enzymes: No results for input(s): CKTOTAL, CKMB, CKMBINDEX, TROPONINI in the last 72 hours. PT/INR: No results for input(s): PROTIME, INR in the last 72 hours. APTT: No results for input(s): APTT in the last 72 hours. Liver Profile:  Lab Results   Component Value Date    AST 21 09/24/2017    ALT 15 09/24/2017    BILIDIR 1.3 09/15/2017    BILITOT 1.5 09/24/2017    ALKPHOS 173 09/24/2017     Lab Results   Component Value Date    CHOL 163 09/30/2017    HDL 23 09/30/2017    TRIG 67 09/30/2017     TSH:   Lab Results   Component Value Date    TSH 2.410 09/19/2017     UA: Lab Results   Component Value Date    COLORU DK YELLOW 09/21/2017    PHUR 5.0 09/21/2017    LABCAST NONE SEEN 09/15/2017    LABCAST NONE SEEN 09/15/2017    WBCUA 10-15 09/21/2017    RBCUA 15-20 09/21/2017    MUCUS THREADS 07/02/2015    YEAST NONE SEEN 09/21/2017    BACTERIA NONE 09/21/2017    SPECGRAV 1.016 09/15/2017    LEUKOCYTESUR SMALL 09/21/2017    UROBILINOGEN 0.2 09/21/2017    BILIRUBINUR NEGATIVE 09/21/2017    BLOODU NEGATIVE 09/21/2017    GLUCOSEU NEGATIVE 09/21/2017    AMORPHOUS DEBRIS 07/02/2015           Diagnostic Data:    EKG: I reviewed all available ECGs and rhythm strips as well as her telemetry; there is no instance of Afib, only sinus with PACs that may be confused for Afib.   Echo:   Results for orders placed during the hospital encounter of 09/15/17   ECHO Complete 2D W Doppler W Color mitral valve structure was normal with normal leaflet separation. DOPPLER: The transmitral velocity was within the normal range with no   evidence for mitral stenosis. Mild mitral regurgitation is present. Moderate annular calcification. Aortic Valve   Aortic valve appears tricuspid. Aortic valve leaflets are Mildly   calcified. Leaflets exhibited mild to moderately increased thickness and   mildly reduced cuspal separation of the aortic valve. No evidence of   aortic valve regurgitation . There is mild aortic stenosis with valve area   of 1.7 sq cm. The maximum aortic valve gradient is 24 mmHg, the mean   gradient is 13 mmHg, and the peak velocity is 2.4 m/s. Tricuspid Valve   The tricuspid valve structure was normal with normal leaflet separation. DOPPLER: There was no evidence of tricuspid stenosis. Moderate tricuspid regurgitation visualized. Right ventricular systolic pressure measures 55. Pulmonic Valve   The pulmonic valve leaflets exhibited normal thickness, no calcification,   and normal cuspal separation. DOPPLER: The transpulmonic velocity was   within the normal range with no evidence for regurgitation. Left Atrium   The left atrium is Moderately to severely dilated. Left Ventricle   Left ventricle size is normal.   Mildly increased left ventricle wall thickness. Mild concentric left ventricular hypertrophy. Systolic function was normal.   There were no regional wall motion abnormalities. Ejection fraction is visually estimated at 65%. Right Atrium   Right atrial size was normal.      Right Ventricle   The right ventricular size was normal with normal systolic function and   wall thickness. Pericardial Effusion   The pericardium was normal in appearance with no evidence of a pericardial   effusion. Pleural Effusion   No evidence of pleural effusion.       Aorta / Great Vessels   -Aortic root dimension within normal limits.   -The Pulmonary artery is

## 2017-10-02 NOTE — PROGRESS NOTES
Progress Note    Patient:  Amina Mazariegos    Unit/Bed:4A-05/005-A  YOB: 1940  MRN: [id number]   Acct: [de-identified]   Admit date: 9/15/2017      Principal Problem:    Acute hepatic encephalopathy  Active Problems:    Essential hypertension    Cirrhosis of liver with ascites (HCC)    Hypothyroidism    History of atrial fibrillation    Primary biliary cirrhosis (HCC)    Hypertensive urgency    Urethrovaginal fistula    Pleural effusion    Thrombocytopenia (HCC)    Microcytic anemia    Hyperbilirubinemia    Hypokalemia    Urinary retention    Hypercalcemia, not POA    Acute renal failure with tubular necrosis (HCC)    Pneumonia, organism unspecified    BEN (acute kidney injury) ([facility] [address]    Dehydration    Acquired hypothyroidism    Hypovitaminosis D    Hepatic encephalopathy (HCC)    Hypothermia, not POA    Sludge in gallbladder    Hyperparathyroidism (HCC)    Bilateral edema of lower extremity    Metabolic acidosis    Severe malnutrition (HCC)    Ac isch multifocal ant circ stroke (HCC)    Dysphagia    Iron deficiency anemia    Physical deconditioning    Hypernatremia      Assessment and Plan:  1. Bilateral Subacute ischemic infarcts concerning for embolic even. Too high risk for [address] HAEVEN today. Discussed with cardio can consider for watchmen procedure as outpatient when recovered from infection. 2. Acute hepatic encephalopathy: recheck ammonia level continue with lactulose  3. Weakness: PT/OT  4. Candida UTI  5. Aspiration pneumonia: ID on board. Rocephin stopped and changed over to Zosyn  on room air  6. Leukocytosis: resolved. Appreciate ID input, now on Zosyn for probable aspiration. 7. BEN: appreciate nephro input, Cr improved but appears volume overloaded, continue with diuresis  8. Sinus tachycardia with HTN: improved on Metoprolol 25mg BID and monitor also on Norcasc  9. VTE prophylaxis: Heparin subq      Patient Seen, Chart, Consults notes, Labs, Radiology studies reviewed.     Subjective: Day 17 of stay with altered mental status and acute encephalopathy found to have bilateral subacute infarcts  Patient seen and examined at bedside, no new complaints or acute overnight events    All other ROS negative except noted in HPI    Past, Family, Social History unchanged from admission. Diet:  DIET DYSPHAGIA I PUREED; Nectar Thick; No Drinking Straw  Dietary Nutrition Supplements: Frozen Oral Supplement    Medications:  Scheduled Meds:   bumetanide  1 mg Intravenous Q8H    chlorothiazide (DIURIL) IVPB  500 mg Intravenous Once    potassium chloride  20 mEq Oral BID WC    metoprolol tartrate  25 mg Oral BID    piperacillin-tazobactam  3.375 g Intravenous Q8H    miconazole   Topical BID    nystatin  5 mL Oral 4x Daily    potassium replacement protocol   Other RX Placeholder    vitamin D  5,000 Units Oral Daily    rifaximin  550 mg Oral BID    levothyroxine  112 mcg Oral Daily    ursodiol  300 mg Oral BID    famotidine  20 mg Oral Daily    nicotine  1 patch Transdermal Daily    phosphorus replacement protocol   Other RX Placeholder    sodium chloride flush  10 mL Intravenous 2 times per day     Continuous Infusions:     PRN Meds:melatonin, haloperidol lactate, acetaminophen, magnesium hydroxide, ondansetron, potassium chloride, magnesium sulfate, labetalol, sodium chloride flush    Objective:  CBC:   Recent Labs      09/30/17   0554  10/01/17   0823  10/02/17   0427   WBC  14.6*  13.5*  9.1   HGB  11.3*  10.1*  11.0*   PLT  67*  50*  40*     BMP:    Recent Labs      09/30/17   0554  10/01/17   0502  10/02/17   0709   NA  146*  145  146*   K  3.9  3.6  3.8   CL  108  109  107   CO2  22*  23  22*   BUN  61*  61*  61*   CREATININE  1.7*  1.4*  1.6*   GLUCOSE  137*  117*  119*     Calcium:  Recent Labs      10/02/17   0709   CALCIUM  11.0*     Ionized Calcium:No results for input(s): IONCA in the last 72 hours.   Magnesium:  Recent Labs      10/02/17   0709   MG  1.8     Phosphorus:No results for input(s): PHOS in the last 72 hours. BNP:No results for input(s): BNP in the last 72 hours. Glucose:  Recent Labs      10/01/17   1757   POCGLU  108     HgbA1C: No results for input(s): LABA1C in the last 72 hours. INR: No results for input(s): INR in the last 72 hours. Hepatic: No results for input(s): ALKPHOS, ALT, AST, PROT, BILITOT, BILIDIR, LABALBU in the last 72 hours. Amylase and Lipase:No results for input(s): LACTA, AMYLASE in the last 72 hours. Lactic Acid: No results for input(s): LACTA in the last 72 hours. Troponin: No results for input(s): CKTOTAL, CKMB, TROPONINT in the last 72 hours. BNP: No results for input(s): BNP in the last 72 hours. Lipids:   Recent Labs      09/30/17   0554   CHOL  163   TRIG  67   HDL  23   LDLCALC  127     ABGs:   Lab Results   Component Value Date    PH 7.41 09/22/2017    PCO2 30 09/22/2017    PO2 88 09/22/2017    HCO3 19 09/22/2017    O2SAT 97 09/22/2017           Radiology reports as per the Radiologist  Radiology: Xr Chest Standard Two Vw    Result Date: 9/19/2017  PROCEDURE: XR CHEST STANDARD TWO VW CLINICAL INFORMATION: pleural effusion, COMPARISON: 9/15/2017 TECHNIQUE: AP upright and lateral views of the chest were obtained. 1. Mild cardiomegaly. 2. Small bilateral pleural effusions. 3. Mild bibasilar atelectasis/pneumonia. 4. Overall appearance of chest improved from prior. **This report has been created using voice recognition software. It may contain minor errors which are inherent in voice recognition technology. ** Final report electronically signed by Dr. Jean-Claude Moran on 9/19/2017 9:55 AM    Ct Head Wo Contrast    Result Date: 9/24/2017  PROCEDURE: CT HEAD WO CONTRAST CLINICAL INFORMATION: CONFUSION/DELIRIUM, ALTERED LOC, UNEXPLAINED,  COMPARISON: 6/25/2013 TECHNIQUE:  Axial CT images were obtained through the head without contrast. All CT scans at this facility use dose modulation, iterative reconstruction, and/or weight-based dosing when appropriate to reduce radiation dose to as low as reasonably achievable. FINDINGS: No acute intracranial hemorrhage or mass effect is seen. There is no midline shift. There is moderate patchy periventricular and subcortical white matter hypoattenuation demonstrated likely representing sequela from moderate chronic small vessel skin changes. This appears progressed when compared to the previous examination from June 2013. The basal cisterns appear within normal limits. The posterior fossa appears unremarkable. There is a rounded opacity posteriorly within the left maxillary sinus inferiorly. This is incompletely visualized but may represent a mucous retention cyst or polyp. The remaining visualized paranasal sinuses appear clear. No acute remote is of the calvarium are seen. The visualized globes and orbits appear  grossly intact. Parasellar carotid artery calcification is noted. There is mild diffuse atrophy. 1. No acute intracranial hemorrhage or mass effect. 2. Moderate chronic small vessel ischemic changes. **This report has been created using voice recognition software. It may contain minor errors which are inherent in voice recognition technology. ** Final report electronically signed by Dr. Chung Osman on 9/24/2017 9:49 AM    Ct Chest Wo Contrast    Result Date: 9/17/2017  PROCEDURE: CT CHEST WO CONTRAST CLINICAL INFORMATION: Pleural effusion, shortness of breath TECHNIQUE: CT of the chest was performed without the use of intravenous contrast. Axial images as well as coronal and sagittal reconstructions were obtained. All CT scans at this facility use dose modulation, iterative reconstruction, and/or weight-based dosing when appropriate to reduce radiation dose to as low as reasonably achievable. COMPARISON: CTA chest 8/28/2013 FINDINGS: There is stable cardiac enlargement. Atherosclerotic calcifications are present in the thoracic aorta and coronary arteries without evidence of aneurysm.  Calcifications of the mitral annulus are again noted. There is no pericardial effusion. There is a small right-sided pleural effusion. Alveolar and reticular opacities are present in the right lung. There are scattered consolidations of the right middle lobe. Minimal reticular opacities are seen in the left lower lobe. There is no mediastinal, hilar or axillary lymphadenopathy. Multiple calcifications are noted in the bilateral breasts. Degenerative and scoliotic changes are present in the thoracolumbar spine without evidence of aggressive osseous lesions. Nodularity of the liver is likely secondary to cirrhosis. The caudate lobe is enlarged. There is a ventral hernia which contains the distal colon and a portion of proximal duodenum. Anasarca is present. 1. Small right-sided pleural effusion. 2. Bilateral pneumonia, worse on the right side. 3. Stable cardiomegaly. 4. Cirrhosis. 5. Ventral hernia containing part of the stomach and proximal small bowel. Final report electronically signed by Dr. Kris Denton on 9/17/2017 4:20 PM    Nm Hepatobiliary    Result Date: 9/21/2017  PROCEDURE: NM HEPATOBILIARY CLINICAL INFORMATION: Epigastric pain Radiopharmaceutical: 8.9 mCi technetium 99m mebrofenin, intravenously. TECHNIQUE: Anterior images of the abdomen were obtained for 60 minutes following radiopharmaceutical administration. Additional images were then obtained in multiple projections. COMPARISON: None FINDINGS: There is normal hepatic uptake of radiotracer. Activity is present in the gallbladder by 19 minutes. There is activity in the common bile duct and small bowel by 29 minutes. Patent cystic and common bile ducts without evidence of acute cholecystitis. Final report electronically signed by Dr. Kris Denton on 9/21/2017 4:39 PM    Us Liver    Result Date: 9/15/2017  PROCEDURE: US GALLBLADDER RUQ, US LIVER CLINICAL INFORMATION: CIRRHOSIS, HEPATITIS,  . COMPARISON: No prior study.  TECHNIQUE: Grayscale and color Doppler ultrasound FINDINGS: Exam is somewhat limited due to patient condition. Liver Liver is somewhat decreased in size. No focal mass or intrahepatic ductal dilatation is seen. There are multiple tortuous vessels at the new hepatis. There is possible recannulated the umbilicus vein. There are multiple hypoechoic masses at the new hepatis indicating periportal pericaval adenopathy. Some of these are as large as 9.7 cm. The gallbladder shows no evidence of stones there is gallbladder wall thickening and pericholecystic edema and sludge within the gallbladder. Common bile duct is not enlarged. 1. Cirrhotic liver with possible recannulated emboli: Vein. 2. Marked periportal pericaval adenopathy. Follow-up with CT scan is recommended. 3. Gallbladder sludge with gallbladder wall thickening and pericholecystic edema. Acalculous cholecystitis is a possibility. **This report has been created using voice recognition software. It may contain minor errors which are inherent in voice recognition technology. ** Final report electronically signed by Dr. Betty Crigler on 9/15/2017 7:23 PM    Us Gallbladder Ruq    Result Date: 9/15/2017  PROCEDURE: US GALLBLADDER RUQ, US LIVER CLINICAL INFORMATION: CIRRHOSIS, HEPATITIS,  . COMPARISON: No prior study. TECHNIQUE: Grayscale and color Doppler ultrasound FINDINGS: Exam is somewhat limited due to patient condition. Liver Liver is somewhat decreased in size. No focal mass or intrahepatic ductal dilatation is seen. There are multiple tortuous vessels at the new hepatis. There is possible recannulated the umbilicus vein. There are multiple hypoechoic masses at the new hepatis indicating periportal pericaval adenopathy. Some of these are as large as 9.7 cm. The gallbladder shows no evidence of stones there is gallbladder wall thickening and pericholecystic edema and sludge within the gallbladder. Common bile duct is not enlarged.      1. Cirrhotic liver with possible recannulated to the medial aspect of the density suggesting perhaps loculation of pleural fluid. Overall the density in the medial right lower lung has cleared to moderate extent. The cardiomediastinal structures are not changed in the interim. There is no interval pneumothorax. The bones are stable. 1.  The left lower lobe atelectasis/infiltrate, and likely left pleural effusion are unchanged pleural effusion. 2.  Findings suggest decrease in atelectasis infiltrate in the right lower lung and possibly redistribution of pleural fluid on the right. **This report has been created using voice recognition software. It may contain minor errors which are inherent in voice recognition technology. ** Final report electronically signed by Dr. Annetta Soliz on 9/29/2017 4:32 PM    Xr Chest Portable    Result Date: 9/28/2017  PROCEDURE: XR CHEST PORTABLE CLINICAL INFORMATION: Cough; questionable aspiration. COMPARISON: 9/21/2017. TECHNIQUE: AP portable chest radiograph performed. FINDINGS: POSTSURGICAL CHANGES: None. LINES/TUBES/MECHANICAL DEVICES: None. TRACHEA/HEART/MEDIASTINUM/HILUM: 1. New obscuration of a portion of the right cardiomediastinal silhouette. 2. Stable mitral annular calcification. LUNG FIELDS: 1. There is new elevation of the right hemidiaphragm to the level of the right hilum. Underlying pleural fluid and atelectasis/infiltrates cannot be excluded. The right upper lung zones are clear. 2. There is new opacification obscuring the retrocardiac left lung base with the differential including pleural fluid and atelectasis/infiltrates. The left lung fields are otherwise clear. OTHER: None. PNEUMOTHORAX:  None. OSSEOUS STRUCTURES: 1. No acute osseous abnormality. 2. Generalized osteopenia. 1. There is new elevation of the right hemidiaphragm to the level of the right hilum. Underlying pleural fluid and atelectasis/infiltrates cannot be excluded. The right upper lung zones are clear.  2. There is new opacification obscuring the retrocardiac left lung base with the differential including pleural fluid and atelectasis/infiltrates. The left lung fields are otherwise clear. **This report has been created using voice recognition software. It may contain minor errors which are inherent in voice recognition technology. ** Final report electronically signed by Dr. Mally Ochoa on 9/28/2017 8:16 AM    Xr Chest Portable    Result Date: 9/21/2017  PROCEDURE: XR CHEST PORTABLE CLINICAL INFORMATION: possible pneumonia, COMPARISON: 9/19/2017 TECHNIQUE: A single mobile view of the chest was obtained. 1. Mild cardiomegaly Moderate calcification of the mitral annulus. 2. Mild bibasilar atelectasis/pneumonia. Overall appearance of chest slightly worse than prior. **This report has been created using voice recognition software. It may contain minor errors which are inherent in voice recognition technology. ** Final report electronically signed by Dr. Glee Canavan on 9/21/2017 2:25 PM    Xr Chest Portable    Result Date: 9/15/2017  PROCEDURE: XR CHEST PORTABLE CLINICAL INFORMATION: NG tube placement,  . COMPARISON: 9/15/2017 at 1740 TECHNIQUE: Portable supine FINDINGS: NG tube is been advanced and is well into the stomach. Distal tip is not included on the image. There are no other changes. NG tube is well into the stomach **This report has been created using voice recognition software. It may contain minor errors which are inherent in voice recognition technology. ** Final report electronically signed by Dr. Maricruz Ray on 9/15/2017 7:10 PM    Xr Chest Portable    Result Date: 9/15/2017  PROCEDURE: XR CHEST PORTABLE CLINICAL INFORMATION: NG placement,  . COMPARISON: 9/15/2017 TECHNIQUE: Portable supine FINDINGS: NG tube extends to the distal esophagus. It needs to be advanced 7 cm to be in the proximal stomach. There are no other changes.      NG tube in the distal esophagus **This report has been created using voice recognition software. It may contain minor errors which are inherent in voice recognition technology. ** Final report electronically signed by Dr. Sina Westfall on 9/15/2017 6:10 PM    Xr Chest Portable    Result Date: 9/15/2017  PROCEDURE: XR CHEST PORTABLE CLINICAL INFORMATION: AMS, . COMPARISON: PA and lateral chest x-ray December 5, 2015. TECHNIQUE: AP upright view of the chest. FINDINGS: The heart size remains mildly enlarged. The mediastinum is not widened. There is mildly increased density in the right lower lobe mostly in the right infrahilar region. There is obscuration to moderate degree the right lateral costophrenic angle. The rest of the lungs are clear. The pulmonary vascularity is normal. No suspicious osseous lesions are present. 1.  Findings consistent with small to moderate size right pleural effusion with underlying atelectasis. Also in the differential would be early infiltrate but this is less favored. Recommend a true PA and lateral chest x-ray preferably in the x-ray department if patient can tolerate. **This report has been created using voice recognition software. It may contain minor errors which are inherent in voice recognition technology. ** Final report electronically signed by Dr. Steve Huff on 9/15/2017 9:14 AM    Vl Carotid Bilateral    Result Date: 9/29/2017  PROCEDURE: Bilateral carotid ultrasound CLINICAL INFORMATION: Stroke COMPARISON: No prior study. TECHNIQUE: Grayscale, color flow and spectral waveform ultrasound images were obtained through the bilateral extracranial carotid and vertebral arteries. Peak systolic and end-diastolic velocities were measured. FINDINGS: The right common carotid artery appears grossly patent. There is no significant atherosclerotic plaque demonstrated at the right carotid bifurcation. The right ICA to CCA ratio is within normal limits. There is antegrade flow within the right vertebral artery. The left common carotid artery appears grossly patent.  There is no significant atherosclerotic plaque demonstrated at the left carotid bifurcation. The left ICA to CCA ratio is within normal limits. There is antegrade flow within the left vertebral artery. RIGHT  PSV/EDV DIST CCA-------->62/9cm/s PROX ICA-------->60/13cm/s ECA---------------->93/0cm/s VERT-------------->45/11cm/s LEFT  PSV/EDV DIST CCA-------->71/12cm/s PROX ICA-------->50/12cm/s ECA---------------->112/9cm/s VERT-------------->41/6cm/s     1. No sonographic evidence of significant flow-limiting stenosis within the proximal internal carotid arteries bilaterally. 2. Antegrade flow within the vertebral arteries bilaterally. **This report has been created using voice recognition software. It may contain minor errors which are inherent in voice recognition technology. ** Final report electronically signed by Dr. Elliot Huang on 9/29/2017 8:12 AM    Us Abdominal Limited    Result Date: 9/15/2017  PROCEDURE: US ABDOMINAL LIMITED CLINICAL INFORMATION: Cirrhosis with ascites,  . COMPARISON: None available. TECHNIQUE: Ace Nipple scale sonographic imaging of the 4 quadrants of the abdomen performed for localization of ascites. FINDINGS: There is a trace amount of free fluid deep to the abdominal wall at the left midabdomen. No large fluid collection is identified. Trace amount of fluid within the abdomen. Insufficient quantity of ascites to safely perform an ultrasound-guided paracentesis. **This report has been created using voice recognition software. It may contain minor errors which are inherent in voice recognition technology. ** Final report electronically signed by Dr. Farzad Perdue on 9/15/2017 5:13 PM    Mri Brain Wo Contrast    Result Date: 9/28/2017  PROCEDURE: MRI BRAIN WO CONTRAST CLINICAL INFORMATION: dysphagia, r/o CVA.  Technologist notes state \"generalized weakness, dysphagia, no sx ca or inj.\" COMPARISON: No prior brain MRI for comparison; some comparison is made to the CT of the brain of 24 lateral projection, swallowing mechanism was evaluated using barium of various consistencies. The radiologist was present for the entire examination. One spot image was obtained. Total fluoroscopy time 1.2 minutes. Speech therapist: Paola Crews COMPARISON: None. FINDINGS: Oral, pharyngeal and esophageal structures appear normal. There is laryngeal penetration of thin barium. No aspiration is identified. 1. Laryngeal penetration of thin barium without evidence of aspiration. 2. Additional recommendations from the speech therapist will follow. **This report has been created using voice recognition software. It may contain minor errors which are inherent in voice recognition technology. ** Final report electronically signed by Dr. Jules Bang on 9/29/2017 9:58 AM    Nm Parathyroid W Spect/ct    Result Date: 9/26/2017  PROCEDURE: NM PARATHYROID PLANAR W/SPECT & CT CLINICAL INFORMATION: Hypercalcemia. Radiopharmaceutical: 27.8 mCi technetium 99m sestamibi, intravenously. TECHNIQUE: Anterior planar images of the neck were obtained immediately and at 2 and 4 hours following radiopharmaceutical administration. Additional SPECT imaging was performed at 2 and 4 hours with transaxial, coronal and sagittal projections. Low-dose  CT was acquired for attenuation correction and localization only. COMPARISON: None FINDINGS: Immediate images show homogeneous distribution of radiotracer throughout the thyroid gland. There is mild diffuse radiotracer accumulation in the gland at 2 hours with minimal radiotracer at 4 hours. There are no discrete foci of abnormal activity. Poor washout of radiotracer from the thyroid gland without evidence of parathyroid adenoma.  Final report electronically signed by Dr. Jules Bang on 9/26/2017 2:21 PM        Physical Exam:  Vitals: BP (!) 122/58  Pulse 64  Temp 95.2 °F (35.1 °C) (Oral)   Resp 14  Ht 5' 1\" (1.549 m)  Wt 164 lb 8 oz (74.6 kg)  SpO2 93%  BMI 31.08 kg/m2  24 hour intake/output:    Intake/Output Summary (Last 24 hours) at 10/02/17 1026  Last data filed at 10/02/17 0357   Gross per 24 hour   Intake              332 ml   Output              335 ml   Net               -3 ml     Last 3 weights: Wt Readings from Last 3 Encounters:   10/02/17 164 lb 8 oz (74.6 kg)   02/16/17 131 lb (59.4 kg)   12/10/15 122 lb (55.3 kg)       General appearance - pleasantly confused appears to be in no acute distress  HEENT: Atraumatic normocephalic, no JVD. Trachea midline.    Chest - Bilateral air entry, no wheezes, crackles or rhonchi  Cardiovascular - S1S2 RRR, no murmurs or gallops  Abdomen - Soft non tender non distended, normoactive bowel sounds   Integumentary - Skin color, texture, turgor normal. No Rashes or lesions  Musculoskeletal -Full ROM, No clubbing or cyanosis  Extremities:  peripheral edema    DVT prophylaxis: [] Lovenox                                 [] SCDs                                 [x] SQ Heparin                                 [] Encourage ambulation           [] Already on Anticoagulation               Electronically signed by Mat Tapia MD on 10/2/2017 at 10:26 AM    Rounding Hospitalist  [phone]

## 2017-10-02 NOTE — PROGRESS NOTES
Renal Progress Note    Patient Vito Youssef   MRN -  [id number]   Acct # - [de-identified]      - 1940    68 y.o. Admit Date: 9/15/2017  Hospital Day: 17  Location: -[facility]  Date of evaluation -  10/2/2017    Subjective:   CC: . Altered mental status  -140/  /24h  Denies sob or cough. Room air   Wt down today 164 (173, 165, 152). Accuracy? ?  Pulled out IV    Objective:   VITALS:  BP (!) 120/59  Pulse 61  Temp 95.8 °F (35.4 °C) (Axillary)   Resp 17  Ht 5' 1\" (1.549 m)  Wt 164 lb 8 oz (74.6 kg)  SpO2 94%  BMI 31.08 kg/m2   Patient Vitals for the past 24 hrs:   BP Temp Temp src Pulse Resp SpO2 Weight   10/02/17 0357 (!) 120/59 95.8 °F (35.4 °C) Axillary 61 17 94 % 164 lb 8 oz (74.6 kg)   10/01/17 2005 (!) 142/67 96.8 °F (36 °C) Axillary 63 16 94 % -   10/01/17 1800 134/62 95.6 °F (35.3 °C) Axillary 58 20 93 % -   10/01/17 1612 134/64 97.6 °F (36.4 °C) Oral 57 17 95 % -   10/01/17 1113 132/60 97.5 °F (36.4 °C) Oral 73 17 93 % -   10/01/17 0756 137/60 96.9 °F (36.1 °C) Axillary 63 16 96 % -       Intake/Output Summary (Last 24 hours) at 10/02/17 0747  Last data filed at 10/02/17 0357   Gross per 24 hour   Intake              342 ml   Output              635 ml   Net             -293 ml     EXAM:  CONSTITUTIONAL:  No acute distress. Lying comfortable in bed. HEENT:  Head is normocephalic, Extraocular movement intact, Mucus membranes moist. Neck is supple. Voice is clear. CARDIOVASCULAR:  S1, S2  regular rate and rhythm. RESPIRATORY: No crackles. Rhonchi. Equal breath sounds. No wheezes. No shortness of breath noted at rest.  ABDOMEN: soft, non tender  NEUROLOGICAL: Patient is alert and oriented to person, place. Recent and remote memory is not intact. Pt is attentive. SKIN: no rash, No significant bruises  MUSCULOSKELETAL: Movement is coordinated.  Moves all extremities   EXTREMITIES: Distal lower extremity temp is warm, 3-4+ lower extremity edema up into buttocks  PSYCHIATRIC: mood and affect appropriate. Medications:   Scheduled Meds:   bumetanide  1 mg Intravenous Q8H    chlorothiazide (DIURIL) IVPB  500 mg Intravenous Once    potassium chloride  20 mEq Oral BID WC    metoprolol tartrate  25 mg Oral BID    piperacillin-tazobactam  3.375 g Intravenous Q8H    miconazole   Topical BID    nystatin  5 mL Oral 4x Daily    potassium replacement protocol   Other RX Placeholder    heparin (porcine)  5,000 Units Subcutaneous 3 times per day    vitamin D  5,000 Units Oral Daily    rifaximin  550 mg Oral BID    levothyroxine  112 mcg Oral Daily    ursodiol  300 mg Oral BID    famotidine  20 mg Oral Daily    nicotine  1 patch Transdermal Daily    phosphorus replacement protocol   Other RX Placeholder    sodium chloride flush  10 mL Intravenous 2 times per day     Continuous Infusions:       Labs:     Recent Labs      09/30/17   0554  10/01/17   0502  10/02/17   0709   NA  146*  145  146*   K  3.9  3.6  3.8   CL  108  109  107   CO2  22*  23  22*   BUN  61*  61*  61*   CREATININE  1.7*  1.4*  1.6*   LABGLOM  29*  36*  31*   GLUCOSE  137*  117*  119*   MG  1.9   --   1.8   CALCIUM  9.9  10.8*  11.0*   CAION  1.34*   --    --      ASSESSMENT:  1. Acute Kidney Injury likely 2nd to dehydration and volume sequestration, Creatinine Improving from high of 3.0 to 1.6. Overall stable, Now volume overload, Increase bumex to 1 mg q8h, Diuril 500 mg IVPB x 1. Noted Na 146. BMP in AM  2. Chronic Kidney Disease Stage III, baseline 1.1 prior to admission  3. Hypokalemia, PO supplement  4. Essential Hypertension  5. Hypothyroidism  6. Dysphagia, Aspiration pneumonia likely. 7. Hypercalcemia, No evidence of parathyroid adenoma per NM scan. Gypsum/Lambda 2.09. Repeat Vit D 125 and PTH. Hold sensipar for now. Monitor closely  8. Cirrhosis Hepatic cirrhosis  9. Urinary tract infection candida  10. Thrombocytopenia  11.  Subacute CVA,     Meds and Labs reviewed  Discussed with Dr Nida Coughlin  Principal Problem:

## 2017-10-02 NOTE — PLAN OF CARE
will decrease  Pain level will decrease   Outcome: Ongoing  Patient has shown no sign or symptom of pain during this shift. Goal: Control of acute pain  Control of acute pain   Outcome: Ongoing  Patient has not shown an sign or symptom of pain during this shift. Goal: Control of chronic pain  Control of chronic pain   Outcome: Ongoing  Patient has not shown an sign or symptom of pain during this shift. Comments:   Care plan reviewed with patient. Patient verbalize understanding of the plan of care and contribute to goal setting.

## 2017-10-03 LAB
ANION GAP SERPL CALCULATED.3IONS-SCNC: 15 MEQ/L (ref 8–16)
ANISOCYTOSIS: ABNORMAL
BASOPHILS # BLD: 0.2 %
BASOPHILS ABSOLUTE: 0 THOU/MM3 (ref 0–0.1)
BUN BLDV-MCNC: 63 MG/DL (ref 7–22)
CALCIUM SERPL-MCNC: 11.5 MG/DL (ref 8.5–10.5)
CHLORIDE BLD-SCNC: 109 MEQ/L (ref 98–111)
CO2: 24 MEQ/L (ref 23–33)
CREAT SERPL-MCNC: 1.6 MG/DL (ref 0.4–1.2)
CRENATED RBC'S: ABNORMAL
EOSINOPHIL # BLD: 0.1 %
EOSINOPHILS ABSOLUTE: 0 THOU/MM3 (ref 0–0.4)
GFR SERPL CREATININE-BSD FRML MDRD: 31 ML/MIN/1.73M2
GLUCOSE BLD-MCNC: 105 MG/DL (ref 70–108)
HCT VFR BLD CALC: 39 % (ref 37–47)
HEMOGLOBIN: 12.7 GM/DL (ref 12–16)
HYPOCHROMIA: ABNORMAL
LYMPHOCYTES # BLD: 2.7 %
LYMPHOCYTES ABSOLUTE: 0.2 THOU/MM3 (ref 1–4.8)
MAGNESIUM: 1.7 MG/DL (ref 1.6–2.4)
MCH RBC QN AUTO: 26 PG (ref 27–31)
MCHC RBC AUTO-ENTMCNC: 32.5 GM/DL (ref 33–37)
MCV RBC AUTO: 80.3 FL (ref 81–99)
MICROCYTES: ABNORMAL
MONOCYTES # BLD: 10.5 %
MONOCYTES ABSOLUTE: 0.8 THOU/MM3 (ref 0.4–1.3)
NUCLEATED RED BLOOD CELLS: 1 /100 WBC
OVALOCYTES: ABNORMAL
PDW BLD-RTO: 23 % (ref 11.5–14.5)
PHENYTOIN FREE: NORMAL
PLATELET # BLD: 35 THOU/MM3 (ref 130–400)
PLATELET ESTIMATE: ABNORMAL
PMV BLD AUTO: 9.1 MCM (ref 7.4–10.4)
POIKILOCYTES: ABNORMAL
POTASSIUM SERPL-SCNC: 2.9 MEQ/L (ref 3.5–5.2)
POTASSIUM SERPL-SCNC: 3.1 MEQ/L (ref 3.5–5.2)
RBC # BLD: 4.86 MILL/MM3 (ref 4.2–5.4)
RBC # BLD: NORMAL 10*6/UL
ROULEAUX: SLIGHT
SCAN OF BLOOD SMEAR: NORMAL
SCHISTOCYTES: ABNORMAL
SEG NEUTROPHILS: 86.5 %
SEGMENTED NEUTROPHILS ABSOLUTE COUNT: 6.2 THOU/MM3 (ref 1.8–7.7)
SODIUM BLD-SCNC: 148 MEQ/L (ref 135–145)
WBC # BLD: 7.2 THOU/MM3 (ref 4.8–10.8)

## 2017-10-03 PROCEDURE — 83735 ASSAY OF MAGNESIUM: CPT

## 2017-10-03 PROCEDURE — 6360000002 HC RX W HCPCS: Performed by: PHYSICIAN ASSISTANT

## 2017-10-03 PROCEDURE — 99232 SBSQ HOSP IP/OBS MODERATE 35: CPT | Performed by: PHYSICIAN ASSISTANT

## 2017-10-03 PROCEDURE — 2580000003 HC RX 258: Performed by: PHYSICIAN ASSISTANT

## 2017-10-03 PROCEDURE — 6370000000 HC RX 637 (ALT 250 FOR IP): Performed by: INTERNAL MEDICINE

## 2017-10-03 PROCEDURE — 97110 THERAPEUTIC EXERCISES: CPT

## 2017-10-03 PROCEDURE — 6370000000 HC RX 637 (ALT 250 FOR IP): Performed by: FAMILY MEDICINE

## 2017-10-03 PROCEDURE — 97530 THERAPEUTIC ACTIVITIES: CPT

## 2017-10-03 PROCEDURE — 6370000000 HC RX 637 (ALT 250 FOR IP): Performed by: NURSE PRACTITIONER

## 2017-10-03 PROCEDURE — 85025 COMPLETE CBC W/AUTO DIFF WBC: CPT

## 2017-10-03 PROCEDURE — 6360000002 HC RX W HCPCS: Performed by: FAMILY MEDICINE

## 2017-10-03 PROCEDURE — 36415 COLL VENOUS BLD VENIPUNCTURE: CPT

## 2017-10-03 PROCEDURE — 6370000000 HC RX 637 (ALT 250 FOR IP): Performed by: HOSPITALIST

## 2017-10-03 PROCEDURE — 2500000003 HC RX 250 WO HCPCS: Performed by: INTERNAL MEDICINE

## 2017-10-03 PROCEDURE — 2580000003 HC RX 258: Performed by: NURSE PRACTITIONER

## 2017-10-03 PROCEDURE — 1210000002 HC MED SURG R&B - NEUROSCIENCE

## 2017-10-03 PROCEDURE — 84132 ASSAY OF SERUM POTASSIUM: CPT

## 2017-10-03 PROCEDURE — 80048 BASIC METABOLIC PNL TOTAL CA: CPT

## 2017-10-03 PROCEDURE — 6370000000 HC RX 637 (ALT 250 FOR IP): Performed by: ANESTHESIOLOGY

## 2017-10-03 PROCEDURE — 99232 SBSQ HOSP IP/OBS MODERATE 35: CPT | Performed by: INTERNAL MEDICINE

## 2017-10-03 PROCEDURE — 99232 SBSQ HOSP IP/OBS MODERATE 35: CPT | Performed by: HOSPITALIST

## 2017-10-03 RX ORDER — BUMETANIDE 0.25 MG/ML
1 INJECTION, SOLUTION INTRAMUSCULAR; INTRAVENOUS EVERY 12 HOURS
Status: DISCONTINUED | OUTPATIENT
Start: 2017-10-03 | End: 2017-10-03

## 2017-10-03 RX ORDER — POTASSIUM CHLORIDE 20 MEQ/1
40 TABLET, EXTENDED RELEASE ORAL 2 TIMES DAILY WITH MEALS
Status: DISCONTINUED | OUTPATIENT
Start: 2017-10-03 | End: 2017-10-03

## 2017-10-03 RX ORDER — POTASSIUM CHLORIDE 7.45 MG/ML
10 INJECTION INTRAVENOUS
Status: COMPLETED | OUTPATIENT
Start: 2017-10-03 | End: 2017-10-04

## 2017-10-03 RX ORDER — DEXTROSE MONOHYDRATE 50 MG/ML
INJECTION, SOLUTION INTRAVENOUS CONTINUOUS
Status: DISCONTINUED | OUTPATIENT
Start: 2017-10-03 | End: 2017-10-05 | Stop reason: HOSPADM

## 2017-10-03 RX ORDER — POTASSIUM CHLORIDE 750 MG/1
40 TABLET, FILM COATED, EXTENDED RELEASE ORAL ONCE
Status: COMPLETED | OUTPATIENT
Start: 2017-10-03 | End: 2017-10-03

## 2017-10-03 RX ADMIN — POTASSIUM CHLORIDE 10 MEQ: 7.46 INJECTION, SOLUTION INTRAVENOUS at 21:43

## 2017-10-03 RX ADMIN — RIFAXIMIN 550 MG: 550 TABLET ORAL at 09:17

## 2017-10-03 RX ADMIN — METOPROLOL TARTRATE 25 MG: 25 TABLET ORAL at 09:17

## 2017-10-03 RX ADMIN — BUMETANIDE 1 MG: 0.25 INJECTION, SOLUTION INTRAMUSCULAR; INTRAVENOUS at 04:35

## 2017-10-03 RX ADMIN — AMOXICILLIN AND CLAVULANATE POTASSIUM 1 TABLET: 500; 125 TABLET, FILM COATED ORAL at 09:17

## 2017-10-03 RX ADMIN — VITAMIN D, TAB 1000IU (100/BT) 5000 UNITS: 25 TAB at 09:17

## 2017-10-03 RX ADMIN — POTASSIUM CHLORIDE 10 MEQ: 7.46 INJECTION, SOLUTION INTRAVENOUS at 22:54

## 2017-10-03 RX ADMIN — NYSTATIN 500000 UNITS: 100000 SUSPENSION ORAL at 09:18

## 2017-10-03 RX ADMIN — FAMOTIDINE 20 MG: 20 TABLET, FILM COATED ORAL at 09:17

## 2017-10-03 RX ADMIN — LEVOTHYROXINE SODIUM 112 MCG: 112 TABLET ORAL at 09:17

## 2017-10-03 RX ADMIN — NYSTATIN 500000 UNITS: 100000 SUSPENSION ORAL at 13:58

## 2017-10-03 RX ADMIN — MICONAZOLE NITRATE: 2 POWDER TOPICAL at 09:21

## 2017-10-03 RX ADMIN — MAGNESIUM SULFATE HEPTAHYDRATE 1 G: 500 INJECTION, SOLUTION INTRAMUSCULAR; INTRAVENOUS at 17:48

## 2017-10-03 RX ADMIN — POTASSIUM CHLORIDE 40 MEQ: 750 TABLET, FILM COATED, EXTENDED RELEASE ORAL at 09:27

## 2017-10-03 RX ADMIN — Medication 10 ML: at 09:20

## 2017-10-03 RX ADMIN — URSODIOL 300 MG: 300 CAPSULE ORAL at 09:17

## 2017-10-03 ASSESSMENT — PAIN SCALES - PAIN ASSESSMENT IN ADVANCED DEMENTIA (PAINAD)
FACIALEXPRESSION: 0
BODYLANGUAGE: 0
BODYLANGUAGE: 0
CONSOLABILITY: 0
BODYLANGUAGE: 0
BODYLANGUAGE: 0
TOTALSCORE: 0
CONSOLABILITY: 0
BREATHING: 0
NEGVOCALIZATION: 0
CONSOLABILITY: 0
BREATHING: 0
FACIALEXPRESSION: 0
FACIALEXPRESSION: 0
BODYLANGUAGE: 0
BREATHING: 0
FACIALEXPRESSION: 0
TOTALSCORE: 0
BREATHING: 0
FACIALEXPRESSION: 0
BREATHING: 0
NEGVOCALIZATION: 0
NEGVOCALIZATION: 0
CONSOLABILITY: 0
FACIALEXPRESSION: 0
TOTALSCORE: 0
BODYLANGUAGE: 0
NEGVOCALIZATION: 0
NEGVOCALIZATION: 0
BREATHING: 0
TOTALSCORE: 0
BREATHING: 0
BODYLANGUAGE: 0
TOTALSCORE: 0
BODYLANGUAGE: 0
TOTALSCORE: 0
NEGVOCALIZATION: 0
TOTALSCORE: 0
FACIALEXPRESSION: 0
CONSOLABILITY: 0
TOTALSCORE: 0
NEGVOCALIZATION: 0
NEGVOCALIZATION: 0
FACIALEXPRESSION: 0
TOTALSCORE: 0
NEGVOCALIZATION: 0
TOTALSCORE: 0
CONSOLABILITY: 0
CONSOLABILITY: 0
BODYLANGUAGE: 0
BREATHING: 0
CONSOLABILITY: 0
TOTALSCORE: 0
NEGVOCALIZATION: 0
FACIALEXPRESSION: 0
CONSOLABILITY: 0
FACIALEXPRESSION: 0
CONSOLABILITY: 0
TOTALSCORE: 0
NEGVOCALIZATION: 0
BODYLANGUAGE: 0
BREATHING: 0
NEGVOCALIZATION: 0
BREATHING: 0

## 2017-10-03 ASSESSMENT — PAIN SCALES - GENERAL
PAINLEVEL_OUTOF10: 0

## 2017-10-03 NOTE — PROGRESS NOTES
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 XXXA    Sludge in gallbladder K82.8    Hyperparathyroidism ([facility] [address] E21.3    Bilateral edema of lower extremity F51.9    Metabolic acidosis C96.2    Severe malnutrition ([facility] [address] E43    Ac isch multifocal ant circ stroke (HCC) I63.529    Dysphagia R13.10    Iron deficiency anemia D50.9    Physical deconditioning R53.81    Hypernatremia E87.0         ASSESSMENT/PLAN   -Biliary Cirrhosis with encephalopathy: today she is more confused  Acute and subacute stroke  Pneumonia suspected aspiration. on treatment  Ischemic stroke acute and subacute  Continue supportive care: long term prognosis is poor.     Manish Ramirez MD, FACP 10/3/2017 1:17 PM

## 2017-10-03 NOTE — PROGRESS NOTES
Pt has eyes closed. Refusing to let heart monitor on. Removed. Dr Kolton Marques updated. Lungs are diminished throughout with IV fluids infusing. Pt has l;arge amounts of edema to lower legs up into sacral area. Emery arms has fluid pockets. Elevated on pillows. Pt is turned to left side.

## 2017-10-03 NOTE — PROGRESS NOTES
Attempted to feed pt., is resting with eyes closed. Arms pulled to chest. No call back from distribution on bear hugger. Warm blanket for this pt and room heat turned up.

## 2017-10-03 NOTE — PROGRESS NOTES
BID    nystatin  5 mL Oral 4x Daily    potassium replacement protocol   Other RX Placeholder    vitamin D  5,000 Units Oral Daily    rifaximin  550 mg Oral BID    levothyroxine  112 mcg Oral Daily    ursodiol  300 mg Oral BID    famotidine  20 mg Oral Daily    nicotine  1 patch Transdermal Daily    phosphorus replacement protocol   Other RX Placeholder    sodium chloride flush  10 mL Intravenous 2 times per day     Continuous Infusions:     CBC:   Recent Labs      10/01/17   0823  10/02/17   0427   WBC  13.5*  9.1   HGB  10.1*  11.0*   PLT  50*  40*     CMP:  Recent Labs      10/01/17   0502  10/02/17   0709  10/03/17   0529   NA  145  146*  148*   K  3.6  3.8  3.1*   CL  109  107  109   CO2  23  22*  24   BUN  61*  61*  63*   CREATININE  1.4*  1.6*  1.6*   GLUCOSE  117*  119*  105   CALCIUM  10.8*  11.0*  11.5*   LABGLOM  36*  31*  31*     Troponin: No results for input(s): TROPONINI in the last 72 hours. BNP: No results for input(s): BNP in the last 72 hours. INR: No results for input(s): INR in the last 72 hours. Lipids: No results for input(s): CHOL, LDLDIRECT, TRIG, HDL, AMYLASE, LIPASE in the last 72 hours. Liver: No results for input(s): AST, ALT, ALKPHOS, PROT, LABALBU, BILITOT in the last 72 hours. Invalid input(s): BILDIR  Iron:  No results for input(s): IRONS, FERRITIN in the last 72 hours.     Invalid input(s): LABIRONS    Objective:   Vitals: /66  Pulse 59  Temp 95.8 °F (35.4 °C) (Oral)   Resp 16  Ht 5' 1\" (1.549 m)  Wt 169 lb 14.4 oz (77.1 kg)  SpO2 92%  BMI 32.1 kg/m2   Wt Readings from Last 3 Encounters:   10/03/17 169 lb 14.4 oz (77.1 kg)   02/16/17 131 lb (59.4 kg)   12/10/15 122 lb (55.3 kg)      24HR INTAKE/OUTPUT:    Intake/Output Summary (Last 24 hours) at 10/03/17 0644  Last data filed at 10/03/17 0634   Gross per 24 hour   Intake              280 ml   Output             1500 ml   Net            -1220 ml       Constitutional:  Alert, awake, no apparent distress Skin:normal   HEENT:Pupils are reactive . Throat is clear   Neck:supple with no jvd  Cardiovascular:  S1, S2 without m/r/g   Respiratory:  CTA B without w/r/r   Abdomen: +bs, soft, non tender   Ext: +++ LE edema  Musculoskeletal:Intact  Neuro:Alert and awake,confused with no deficit      Electronically signed by Katie Rodriguez MD on 10/3/2017 at 6:44 AM

## 2017-10-03 NOTE — PROGRESS NOTES
ecf on discharge      Patient Seen, Chart, Consults notes, Labs, Radiology studies reviewed. Subjective: Day 18 of stay with altered mental status and acute encephalopathy found to have bilateral subacute infarcts  Patient seen and examined at bedside, still confused. No new complaints or acute overnight events    All other ROS negative except noted in HPI    Past, Family, Social History unchanged from admission. Diet:  DIET DYSPHAGIA I PUREED; Nectar Thick; No Drinking Straw  Dietary Nutrition Supplements: Frozen Oral Supplement    Medications:  Scheduled Meds:   potassium chloride  40 mEq Oral Once    amoxicillin-clavulanate  1 tablet Oral 2 times per day    metoprolol tartrate  25 mg Oral BID    miconazole   Topical BID    nystatin  5 mL Oral 4x Daily    potassium replacement protocol   Other RX Placeholder    vitamin D  5,000 Units Oral Daily    rifaximin  550 mg Oral BID    levothyroxine  112 mcg Oral Daily    ursodiol  300 mg Oral BID    famotidine  20 mg Oral Daily    nicotine  1 patch Transdermal Daily    phosphorus replacement protocol   Other RX Placeholder    sodium chloride flush  10 mL Intravenous 2 times per day     Continuous Infusions:   dextrose       PRN Meds:melatonin, haloperidol lactate, acetaminophen, magnesium hydroxide, ondansetron, potassium chloride, magnesium sulfate, labetalol, sodium chloride flush    Objective:  CBC:   Recent Labs      10/01/17   0823  10/02/17   0427  10/03/17   0613   WBC  13.5*  9.1  7.2   HGB  10.1*  11.0*  12.7   PLT  50*  40*  35*     BMP:    Recent Labs      10/01/17   0502  10/02/17   0709  10/03/17   0529   NA  145  146*  148*   K  3.6  3.8  3.1*   CL  109  107  109   CO2  23  22*  24   BUN  61*  61*  63*   CREATININE  1.4*  1.6*  1.6*   GLUCOSE  117*  119*  105     Calcium:  Recent Labs      10/03/17   0529   CALCIUM  11.5*     Ionized Calcium:No results for input(s): IONCA in the last 72 hours.   Magnesium:  Recent Labs      10/03/17 iterative reconstruction, and/or weight-based dosing when appropriate to reduce radiation dose to as low as reasonably achievable. FINDINGS: No acute intracranial hemorrhage or mass effect is seen. There is no midline shift. There is moderate patchy periventricular and subcortical white matter hypoattenuation demonstrated likely representing sequela from moderate chronic small vessel skin changes. This appears progressed when compared to the previous examination from June 2013. The basal cisterns appear within normal limits. The posterior fossa appears unremarkable. There is a rounded opacity posteriorly within the left maxillary sinus inferiorly. This is incompletely visualized but may represent a mucous retention cyst or polyp. The remaining visualized paranasal sinuses appear clear. No acute remote is of the calvarium are seen. The visualized globes and orbits appear  grossly intact. Parasellar carotid artery calcification is noted. There is mild diffuse atrophy. 1. No acute intracranial hemorrhage or mass effect. 2. Moderate chronic small vessel ischemic changes. **This report has been created using voice recognition software. It may contain minor errors which are inherent in voice recognition technology. ** Final report electronically signed by Dr. Orrin Koyanagi on 9/24/2017 9:49 AM    Ct Chest Wo Contrast    Result Date: 9/17/2017  PROCEDURE: CT CHEST WO CONTRAST CLINICAL INFORMATION: Pleural effusion, shortness of breath TECHNIQUE: CT of the chest was performed without the use of intravenous contrast. Axial images as well as coronal and sagittal reconstructions were obtained. All CT scans at this facility use dose modulation, iterative reconstruction, and/or weight-based dosing when appropriate to reduce radiation dose to as low as reasonably achievable. COMPARISON: CTA chest 8/28/2013 FINDINGS: There is stable cardiac enlargement.  Atherosclerotic calcifications are present in the thoracic aorta and coronary arteries without evidence of aneurysm. Calcifications of the mitral annulus are again noted. There is no pericardial effusion. There is a small right-sided pleural effusion. Alveolar and reticular opacities are present in the right lung. There are scattered consolidations of the right middle lobe. Minimal reticular opacities are seen in the left lower lobe. There is no mediastinal, hilar or axillary lymphadenopathy. Multiple calcifications are noted in the bilateral breasts. Degenerative and scoliotic changes are present in the thoracolumbar spine without evidence of aggressive osseous lesions. Nodularity of the liver is likely secondary to cirrhosis. The caudate lobe is enlarged. There is a ventral hernia which contains the distal colon and a portion of proximal duodenum. Anasarca is present. 1. Small right-sided pleural effusion. 2. Bilateral pneumonia, worse on the right side. 3. Stable cardiomegaly. 4. Cirrhosis. 5. Ventral hernia containing part of the stomach and proximal small bowel. Final report electronically signed by Dr. Teresa Calderón on 9/17/2017 4:20 PM    Nm Hepatobiliary    Result Date: 9/21/2017  PROCEDURE: NM HEPATOBILIARY CLINICAL INFORMATION: Epigastric pain Radiopharmaceutical: 8.9 mCi technetium 99m mebrofenin, intravenously. TECHNIQUE: Anterior images of the abdomen were obtained for 60 minutes following radiopharmaceutical administration. Additional images were then obtained in multiple projections. COMPARISON: None FINDINGS: There is normal hepatic uptake of radiotracer. Activity is present in the gallbladder by 19 minutes. There is activity in the common bile duct and small bowel by 29 minutes. Patent cystic and common bile ducts without evidence of acute cholecystitis.  Final report electronically signed by Dr. Teresa Calderón on 9/21/2017 4:39 PM    Us Liver    Result Date: 9/15/2017  PROCEDURE: US GALLBLADDER RUQ, US LIVER CLINICAL INFORMATION: CIRRHOSIS, HEPATITIS,  . COMPARISON: No prior study. TECHNIQUE: Grayscale and color Doppler ultrasound FINDINGS: Exam is somewhat limited due to patient condition. Liver Liver is somewhat decreased in size. No focal mass or intrahepatic ductal dilatation is seen. There are multiple tortuous vessels at the new hepatis. There is possible recannulated the umbilicus vein. There are multiple hypoechoic masses at the new hepatis indicating periportal pericaval adenopathy. Some of these are as large as 9.7 cm. The gallbladder shows no evidence of stones there is gallbladder wall thickening and pericholecystic edema and sludge within the gallbladder. Common bile duct is not enlarged. 1. Cirrhotic liver with possible recannulated emboli: Vein. 2. Marked periportal pericaval adenopathy. Follow-up with CT scan is recommended. 3. Gallbladder sludge with gallbladder wall thickening and pericholecystic edema. Acalculous cholecystitis is a possibility. **This report has been created using voice recognition software. It may contain minor errors which are inherent in voice recognition technology. ** Final report electronically signed by Dr. Betty Crigler on 9/15/2017 7:23 PM    Us Gallbladder Ruq    Result Date: 9/15/2017  PROCEDURE: US GALLBLADDER RUQ, US LIVER CLINICAL INFORMATION: CIRRHOSIS, HEPATITIS,  . COMPARISON: No prior study. TECHNIQUE: Grayscale and color Doppler ultrasound FINDINGS: Exam is somewhat limited due to patient condition. Liver Liver is somewhat decreased in size. No focal mass or intrahepatic ductal dilatation is seen. There are multiple tortuous vessels at the new hepatis. There is possible recannulated the umbilicus vein. There are multiple hypoechoic masses at the new hepatis indicating periportal pericaval adenopathy. Some of these are as large as 9.7 cm. The gallbladder shows no evidence of stones there is gallbladder wall thickening and pericholecystic edema and sludge within the gallbladder.  Common bile duct is not enlarged. 1. Cirrhotic liver with possible recannulated emboli: Vein. 2. Marked periportal pericaval adenopathy. Follow-up with CT scan is recommended. 3. Gallbladder sludge with gallbladder wall thickening and pericholecystic edema. Acalculous cholecystitis is a possibility. **This report has been created using voice recognition software. It may contain minor errors which are inherent in voice recognition technology. ** Final report electronically signed by Dr. French Benz on 9/15/2017 7:23 PM    Us Renal Limited    Result Date: 9/22/2017  PROCEDURE: US RENAL LIMITED CLINICAL INFORMATION: Acute kidney injury TECHNIQUE: Ultrasound of the kidneys and urinary bladder was performed. Grayscale and color Doppler images were obtained. COMPARISON: None FINDINGS: The right kidney measures 8.9 x 4.8 x 3.8 cm and the left kidney measures 9.0 x 4.9 x 4.1 cm. Renal cortical thickness is normal bilaterally. The renal cortex on the right side is slightly echogenic. There is no evidence of hydronephrosis, calculi or intrarenal mass on either side. The sonographer was unable to obtain arcuate resistive indices. There is a Aguilar catheter in the urinary bladder which cannot be evaluated on the current study. A right-sided pleural effusion is incompletely visualized on the current study. 1. Echogenic right renal cortex, possibly secondary to medical renal disease, but otherwise unremarkable renal ultrasound. 2. Aguilar catheter in a nondistended urinary bladder. Final report electronically signed by Dr. Nikolas Townsend on 9/22/2017 8:24 AM    Xr Chest Portable    Result Date: 9/29/2017  PROCEDURE: XR CHEST PORTABLE CLINICAL INFORMATION: cough, . COMPARISON: Chest x-ray AP portable semiupright September 28, 2017. TECHNIQUE: AP upright view of the chest. FINDINGS: There is left retrocardiac density which is unchanged. Left diaphragm is still seen. The left lateral costophrenic angle remains obscured.  The opacity in the right lower lung is slightly decreased. There is a scalloped appearance to the medial aspect of the density suggesting perhaps loculation of pleural fluid. Overall the density in the medial right lower lung has cleared to moderate extent. The cardiomediastinal structures are not changed in the interim. There is no interval pneumothorax. The bones are stable. 1.  The left lower lobe atelectasis/infiltrate, and likely left pleural effusion are unchanged pleural effusion. 2.  Findings suggest decrease in atelectasis infiltrate in the right lower lung and possibly redistribution of pleural fluid on the right. **This report has been created using voice recognition software. It may contain minor errors which are inherent in voice recognition technology. ** Final report electronically signed by Dr. Crystal Antunez on 9/29/2017 4:32 PM    Xr Chest Portable    Result Date: 9/28/2017  PROCEDURE: XR CHEST PORTABLE CLINICAL INFORMATION: Cough; questionable aspiration. COMPARISON: 9/21/2017. TECHNIQUE: AP portable chest radiograph performed. FINDINGS: POSTSURGICAL CHANGES: None. LINES/TUBES/MECHANICAL DEVICES: None. TRACHEA/HEART/MEDIASTINUM/HILUM: 1. New obscuration of a portion of the right cardiomediastinal silhouette. 2. Stable mitral annular calcification. LUNG FIELDS: 1. There is new elevation of the right hemidiaphragm to the level of the right hilum. Underlying pleural fluid and atelectasis/infiltrates cannot be excluded. The right upper lung zones are clear. 2. There is new opacification obscuring the retrocardiac left lung base with the differential including pleural fluid and atelectasis/infiltrates. The left lung fields are otherwise clear. OTHER: None. PNEUMOTHORAX:  None. OSSEOUS STRUCTURES: 1. No acute osseous abnormality. 2. Generalized osteopenia. 1. There is new elevation of the right hemidiaphragm to the level of the right hilum. Underlying pleural fluid and atelectasis/infiltrates cannot be excluded.  The right upper report has been created using voice recognition software. It may contain minor errors which are inherent in voice recognition technology. ** Final report electronically signed by Dr. Joanie San on 9/15/2017 6:10 PM    Xr Chest Portable    Result Date: 9/15/2017  PROCEDURE: XR CHEST PORTABLE CLINICAL INFORMATION: AMS, . COMPARISON: PA and lateral chest x-ray December 5, 2015. TECHNIQUE: AP upright view of the chest. FINDINGS: The heart size remains mildly enlarged. The mediastinum is not widened. There is mildly increased density in the right lower lobe mostly in the right infrahilar region. There is obscuration to moderate degree the right lateral costophrenic angle. The rest of the lungs are clear. The pulmonary vascularity is normal. No suspicious osseous lesions are present. 1.  Findings consistent with small to moderate size right pleural effusion with underlying atelectasis. Also in the differential would be early infiltrate but this is less favored. Recommend a true PA and lateral chest x-ray preferably in the x-ray department if patient can tolerate. **This report has been created using voice recognition software. It may contain minor errors which are inherent in voice recognition technology. ** Final report electronically signed by Dr. Ricardo King on 9/15/2017 9:14 AM    Vl Carotid Bilateral    Result Date: 9/29/2017  PROCEDURE: Bilateral carotid ultrasound CLINICAL INFORMATION: Stroke COMPARISON: No prior study. TECHNIQUE: Grayscale, color flow and spectral waveform ultrasound images were obtained through the bilateral extracranial carotid and vertebral arteries. Peak systolic and end-diastolic velocities were measured. FINDINGS: The right common carotid artery appears grossly patent. There is no significant atherosclerotic plaque demonstrated at the right carotid bifurcation. The right ICA to CCA ratio is within normal limits. There is antegrade flow within the right vertebral artery.  The left comparison is made to the CT of the brain of 24 September 2017. TECHNIQUE: Using a 1.5 Dottie Siemens scanner, axial diffusion weighted echoplanar imaging, T2-weighted turbo spin-echo, fat-saturated T2-weighted fluid attenuated inversion recovery, and proton density weighted gradient recall images were obtained of the  entire brain. Sagittal 3-D T1 weighted volume acquisition gradient recall images were also obtained, with multiplanar reconstructions. No IV contrast administered. FINDINGS: The global atrophy, mild, is again demonstrated. There are multiple foci of restricted diffusion demonstrated. These are most impressively involving the cortex of the left frontal lobe, nonconfluent, with some minimal involvement of the parafalcine left parietal lobe. There are also foci of restricted diffusion of the right frontal lobe and right occipital lobe. Small focus of restricted diffusion likely present in the cortex of the left occipital lobe also. Larger areas of restricted diffusion are present in the left cerebellar hemisphere. All of these have associated hyperintense T2 signal, though some are more impressive than others. Specifically, more impressive T2 signal abnormalities are present associated with the left cerebellar diffusion abnormalities than the supratentorial regions. Non of these have associated blood products though there is evidence of hemosiderin in the subependymal aspects of the right frontal lobe. Correlation of findings indicate multiple foci of subacute ischemic infarctions involving the anterior and posterior circulation distributions. This could represent watershed distribution in some areas, though not clearly defining watershed regions. Explanation could represent embolic phenomenon, though not strongly suggestive of sides. Hyperintense T2 abnormalities are present in the white matter of both cerebral hemispheres, confluent, symmetric.  Minimal amounts of hyperintense T2 abnormality also present in the pontine white matter. Evidence of remote lacunar infarction of the right cerebellum. There is suspicious for remote lacunar infarction of the left thalamus. No definite sequelae of infarction of the basal ganglia though this is difficult to confirm. No definite gliosis involving the cerebral cortex of either hemisphere. Limited detail of the cervical canal and spinal cord and sella show no findings of acute abnormality. Some canal stenosis at C4-5 evident. The paranasal sinuses show no concerning characteristics, though there is likely mucosal retention cyst of the left maxillary sinus and minimal mucosal thickening of the left more than right ethmoid air cells and sphenoid sinuses. There is nonspecific fluid signal in the left mastoid air cells, and middle ear cavities been clear bilaterally. Vascular structures grossly show nothing concerning. SUBACUTE ISCHEMIC INFARCTIONS INVOLVING BOTH ANTERIOR AND POSTERIOR CIRCULATION DISTRIBUTIONS. THESE ARE SMALL FOCI, CLUSTERED IN THE LEFT FRONTAL LOBE, WITH LESS IMPRESSIVE INVOLVEMENT OF THE RIGHT FRONTAL LOBE AND BILATERAL OCCIPITAL LOBES AND THE LEFT PARIETAL LOBE. SUBACUTE ISCHEMIC INFARCTIONS OF LIKELY DIFFERENT AGE IN THE LEFT CEREBELLUM. NONE HAVE ASSOCIATED BLOOD PRODUCTS. REMOTE MICROHEMORRHAGE IN THE RIGHT FRONTAL LOBE. MODERATE SMALL VESSEL DISEASE SEQUELAE. MILD PARANASAL SINUS DISEASE. FINDINGS DISCUSSED WITH THE [facility] NURSING STAFF AT THE TIME OF THE INTERPRETATION, 1556 HOURS ON 28 SEPTEMBER 2017. **This report has been created using voice recognition software. It may contain minor errors which are inherent in voice recognition technology. ** Final report electronically signed by Dr. Paz Hurt on 9/28/2017 4:01 PM    Fluoroscopy Modified Barium Swallow With Video    Result Date: 9/29/2017  PROCEDURE: FL MODIFIED BARIUM SWALLOW W VIDEO CLINICAL INFORMATION: Dysphasia TECHNIQUE: Fluoroscopy was provided for modified barium swallowing study performed by speech therapy. With the patient in the lateral projection, swallowing mechanism was evaluated using barium of various consistencies. The radiologist was present for the entire examination. One spot image was obtained. Total fluoroscopy time 1.2 minutes. Speech therapist: Christel Amin COMPARISON: None. FINDINGS: Oral, pharyngeal and esophageal structures appear normal. There is laryngeal penetration of thin barium. No aspiration is identified. 1. Laryngeal penetration of thin barium without evidence of aspiration. 2. Additional recommendations from the speech therapist will follow. **This report has been created using voice recognition software. It may contain minor errors which are inherent in voice recognition technology. ** Final report electronically signed by Dr. Sushma Pete on 9/29/2017 9:58 AM    Nm Parathyroid W Spect/ct    Result Date: 9/26/2017  PROCEDURE: NM PARATHYROID PLANAR W/SPECT & CT CLINICAL INFORMATION: Hypercalcemia. Radiopharmaceutical: 27.8 mCi technetium 99m sestamibi, intravenously. TECHNIQUE: Anterior planar images of the neck were obtained immediately and at 2 and 4 hours following radiopharmaceutical administration. Additional SPECT imaging was performed at 2 and 4 hours with transaxial, coronal and sagittal projections. Low-dose  CT was acquired for attenuation correction and localization only. COMPARISON: None FINDINGS: Immediate images show homogeneous distribution of radiotracer throughout the thyroid gland. There is mild diffuse radiotracer accumulation in the gland at 2 hours with minimal radiotracer at 4 hours. There are no discrete foci of abnormal activity. Poor washout of radiotracer from the thyroid gland without evidence of parathyroid adenoma.  Final report electronically signed by Dr. Sushma Pete on 9/26/2017 2:21 PM        Physical Exam:  Vitals: BP (!) 121/59  Pulse 62  Temp 95.8 °F (35.4 °C) (Oral)   Resp 20  Ht 5' 1\" (1.549 m)  Wt 169 lb 14.4 oz (77.1 kg)  SpO2 92%  BMI 32.1 kg/m2  24 hour intake/output:    Intake/Output Summary (Last 24 hours) at 10/03/17 0919  Last data filed at 10/03/17 0634   Gross per 24 hour   Intake              280 ml   Output             1500 ml   Net            -1220 ml     Last 3 weights: Wt Readings from Last 3 Encounters:   10/03/17 169 lb 14.4 oz (77.1 kg)   02/16/17 131 lb (59.4 kg)   12/10/15 122 lb (55.3 kg)       General appearance - pleasantly confused appears to be in no acute distress  HEENT: Atraumatic normocephalic, no JVD. Trachea midline.    Chest - Bilateral air entry, no wheezes, crackles or rhonchi  Cardiovascular - S1S2 RRR, no murmurs or gallops  Abdomen - Soft non tender non distended, normoactive bowel sounds   Integumentary - Skin color, texture, turgor normal. No Rashes or lesions  Musculoskeletal -Full ROM, No clubbing or cyanosis  Extremities:  peripheral edema    DVT prophylaxis: [] Lovenox                                 [x] SCDs                                 [] SQ Heparin                                 [] Encourage ambulation           [] Already on Anticoagulation               Electronically signed by Racheal Orellana MD on 10/3/2017 at 9:19 AM    Rounding Hospitalist  [phone]

## 2017-10-03 NOTE — PROGRESS NOTES
[address] [address]  INPATIENT PHYSICAL THERAPY  DAILYNOTE  [facility]    Time In: 762  Time Out: 2277  Timed Code Treatment Minutes: 28 Minutes  Minutes: 35          Date: 10/3/2017  Patient Name: Ross Lance,  Gender:  female        MRN: [id number]  : 1940  (68 y.o.)     Referring Practitioner: Katty Peters  Diagnosis: hypertensive urgency  Additional Pertinent Hx: admit with above diagnosis, BEN, metabolic acidosis, bilateral pneumonia, hepatic cirrhosis, liver cirrhosis     Past Medical History:   Diagnosis Date    Anemia     Arthritis     Blood transfusion reaction     Cancer ([facility]     colon in polyps    Cirrhosis ([facility]     biliary    Heart murmur     Hernia     Hypertension     Thyroid disease     Unspecified diseases of blood and blood-forming organs     anemia     Past Surgical History:   Procedure Laterality Date    CATARACT REMOVAL      jolene     COLON SURGERY  2002    resection Dr. Juancho Salazar      Dr. Donna Anton  as a child   [address] [address] TUMOR REMOVAL      of stomach  Dr. Nino Dos Santos  3     Dr. Adrien Melendrez       Restrictions/Precautions:  Restrictions/Precautions: General Precautions, Fall Risk                      Subjective:  Chart Reviewed: Yes  Family / Caregiver Present: No  Subjective: pt confused and hallucinating during session, pt incontinent of bowel upon arrival, nursing assisted with cleaning    Pain:  Unable to Assess.   Pain Assessment  Pain Level: 0       Social/Functional:  Lives With: Alone  Type of Home: House  Home Layout: One level  Home Access: Stairs to enter with rails  Entrance Stairs - Number of Steps: 3  Home Equipment:  (no AD used PTA)     Objective:  Rolling to Left: Moderate assistance  Rolling to Right: Moderate assistance (to maxA, several attempts, cues for hooklying and log roll technique)  Supine to Sit: Maximum assistance (x1, HOB up

## 2017-10-03 NOTE — PROGRESS NOTES
José Miguel Gross 60  INPATIENT OCCUPATIONAL THERAPY  [facility] 4A  DAILY NOTE    Time:  Time In: 3334  Time Out: 1446  Timed Code Treatment Minutes: 8 Minutes  Minutes: 8          Date: 10/3/2017  Patient Name: Yanira Zepeda,   Gender: female      Room: [facility]/005-A  MRN: [id number]  : 1940  (68 y.o.)  Referring Practitioner: Dr. Toma Sanders MD  Diagnosis: Hypertensive Urgency  Diagnosis: Hypertensive urgency  Additional Pertinent Hx: Patient with history of CASTANON and cirrhosis, transferred with report of altered mental status, uncontrolled hypertension with SBP in the 200s. She has history of atrial fibrillation, not on anticoagulation due to thrombocytopenia    Restrictions/Precautions:  Restrictions/Precautions: General Precautions, Fall Risk            Past Medical History:   Diagnosis Date    Anemia     Arthritis     Blood transfusion reaction     Cancer ([facility] [address]     colon in polyps    Cirrhosis ([facility] [address]     biliary    Heart murmur     Hernia     Hypertension     Thyroid disease     Unspecified diseases of blood and blood-forming organs     anemia     Past Surgical History:   Procedure Laterality Date    CATARACT REMOVAL      jolene     COLON SURGERY  2002    resection Dr. Darlene Mills      Dr. María Elena Moralez  as a child   [address] [address] TUMOR REMOVAL      of stomach 2013 Dr. Kike Garcia  3     Dr. Silvia Tyson           Subjective   Subjective: Pt very drowsy in bed, Pt did not open eyes during session, mumbled but did not make sense       Pain:  Pain Assessment  Patient Currently in Pain: No (no signs of pain during session)       Objective  Overall Cognitive Status: Exceptions (Pt did not follow commands)        Type of ROM/Therapeutic Exercise: PROM  Comment: Completed PROM B elbow flexion/extension, wrist, & finger flexion/extension x 10 reps.  Pt resisted PROM of shoulders                      Activity

## 2017-10-03 NOTE — PLAN OF CARE
Problem: Falls - Risk of  Goal: Absence of falls  Outcome: Ongoing  No falls this shift. Pt is turned and repositioned with bed rails up 3/4. Problem: Nutrition  Goal: Optimal nutrition therapy  Outcome: Not Met This Shift  Refused am meal. Fed lunch 50% taken. Supper pt is asleep some foods taken off tray and saved for when pt awakes. Problem: Risk for Impaired Skin Integrity  Goal: Tissue integrity - skin and mucous membranes  Structural intactness and normal physiological function of skin and  mucous membranes. Outcome: Ongoing  Excoriation to buttocks and jovani areas. Desenex applied with moisture barrier. Turned pt every 2 hours. Problem: Discharge Planning:  Goal: Participates in care planning  Participates in care planning   Outcome: Not Met This Shift  Pt is confessed unable to participate in care. Goal: Discharged to appropriate level of care  Discharged to appropriate level of care   Outcome: Ongoing  Plan after family meeting for pt to go to [facility]. Problem: Airway Clearance - Ineffective:  Goal: Ability to maintain a clear airway will improve  Ability to maintain a clear airway will improve   Outcome: Ongoing  Weak non productive cough. Problem: Anxiety/Stress:  Goal: Level of anxiety will decrease  Level of anxiety will decrease   Outcome: Met This Shift  Quiet. Confuseed unaware of name, situation or date. Reassured pt of what staff was doing like turning the pt. Problem: Aspiration:  Goal: Absence of aspiration  Absence of aspiration   Outcome: Ongoing  NO cough after intake of lunch. Pt was fed. Problem: Bowel Function - Altered:  Goal: Bowel elimination is within specified parameters  Bowel elimination is within specified parameters   Outcome: Met This Shift  Several loose BM's this shift. Problem: Cardiac Output - Decreased:  Goal: Hemodynamic stability will improve  Hemodynamic stability will improve   Outcome: Ongoing  /59 HR 62.     Problem:

## 2017-10-03 NOTE — PROGRESS NOTES
Cardiology Progress Note      Patient:  Hoa Lala  YOB: 1940  MRN: [id number]   Acct: [de-identified]  Admit Date:  9/15/2017  Primary Cardiologist: Antoni Ireland MD  Seen by dr Hector Andrews    Reason for consult - \"irregular rhythm - PAF - new strokes - unable to anticoagulate due to liver disease/low plts - ?need HEAVEN - ?watchman procedure\"  hpi per dr Hector Andrews \"This is a pleasant 68 y.o. female who is being treated for encephalopathy without focal neurological deficit. Has active aspiration pneumonia being treated with Zosyn IV. She has a history of primary biliary cirrhosis. No seizure history. She has bilateral subacute ischemic infarcts. She has a history of ? PAF and thrombocytopenia. She also has a history of GAVE, esophageal varices, and hx of GIST turmor s/p resection. She is quite frail and is not on any [address]. She currently without any major complaints. \"    Subjective (Events in last 24 hours): pt awake and alert. NAD. Confused. No cp or sob      Objective:   /64  Pulse 56  Temp 95.8 °F (35.4 °C) (Oral)   Resp 18  Ht 5' 1\" (1.549 m)  Wt 169 lb 14.4 oz (77.1 kg)  SpO2 90%  BMI 32.1 kg/m2       TELEMETRY: nsr    Physical Exam:  General Appearance: awake and alert. Confused.   NAD  Cardiovascular: normal rate, regular rhythm, normal S1 and S2, no murmurs, rubs, clicks, or gallops, distal pulses intact, no carotid bruits, no JVD  Pulmonary/Chest: clear to auscultation bilaterally- no wheezes, rales or rhonchi, normal air movement, no respiratory distress  Abdomen: soft, non-tender, non-distended, normal bowel sounds, no masses Extremities: +ble edema, pulse   Skin: warm and dry, no rash or erythema  Head: normocephalic and atraumatic  Eyes: pupils equal, round, and reactive to light  Neck: supple and non-tender without mass, no thyromegaly   Musculoskeletal: normal range of motion, no joint swelling, deformity or tenderness  Neurological: alert, oriented, normal speech, no focal findings or movement disorder noted    Medications:    amoxicillin-clavulanate  1 tablet Oral 2 times per day    metoprolol tartrate  25 mg Oral BID    miconazole   Topical BID    nystatin  5 mL Oral 4x Daily    potassium replacement protocol   Other RX Placeholder    vitamin D  5,000 Units Oral Daily    rifaximin  550 mg Oral BID    levothyroxine  112 mcg Oral Daily    ursodiol  300 mg Oral BID    famotidine  20 mg Oral Daily    nicotine  1 patch Transdermal Daily    phosphorus replacement protocol   Other RX Placeholder    sodium chloride flush  10 mL Intravenous 2 times per day      dextrose         melatonin 3 mg Nightly PRN   haloperidol lactate 1 mg Q6H PRN   acetaminophen 650 mg Q4H PRN   magnesium hydroxide 30 mL Daily PRN   ondansetron 4 mg Q6H PRN   potassium chloride 10 mEq PRN   magnesium sulfate 1 g PRN   labetalol 10 mg Q4H PRN   sodium chloride flush 10 mL PRN       Diagnostics:  Echo - 9/15/17   Summary   Left ventricle size is normal.   Mildly increased left ventricle wall thickness.   Mild concentric left ventricular hypertrophy.   Systolic function was normal.   There were no regional wall motion abnormalities.   Ejection fraction is visually estimated at 65%.   The left atrium is Moderately to severely dilated.   Moderate tricuspid regurgitation visualized.   Right ventricular systolic pressure measures 55.      Signature      ----------------------------------------------------------------   Electronically signed by Roosevelt Rosales MD (Interpreting   physician) on 09/15/2017 at 05:41 PM    Lab Data:    Cardiac Enzymes:  No results for input(s): CKTOTAL, CKMB, CKMBINDEX, TROPONINI in the last 72 hours.     CBC:   Lab Results   Component Value Date    WBC 7.2 10/03/2017    RBC 4.86 10/03/2017    RBC 4.14 10/17/2011    HGB 12.7 10/03/2017    HCT 39.0 10/03/2017    PLT 35 10/03/2017       CMP:  Lab Results   Component Value Date     10/03/2017    K 3.1 10/03/2017

## 2017-10-04 ENCOUNTER — APPOINTMENT (OUTPATIENT)
Dept: GENERAL RADIOLOGY | Age: 77
DRG: 441 | End: 2017-10-04
Attending: ANESTHESIOLOGY
Payer: MEDICARE

## 2017-10-04 ENCOUNTER — APPOINTMENT (OUTPATIENT)
Dept: ULTRASOUND IMAGING | Age: 77
DRG: 441 | End: 2017-10-04
Attending: ANESTHESIOLOGY
Payer: MEDICARE

## 2017-10-04 ENCOUNTER — APPOINTMENT (OUTPATIENT)
Dept: CT IMAGING | Age: 77
DRG: 441 | End: 2017-10-04
Attending: ANESTHESIOLOGY
Payer: MEDICARE

## 2017-10-04 LAB
ALLEN TEST: POSITIVE
ANION GAP SERPL CALCULATED.3IONS-SCNC: 13 MEQ/L (ref 8–16)
ANISOCYTOSIS: ABNORMAL
BASE EXCESS (CALCULATED): 1.8 MMOL/L (ref -2.5–2.5)
BASOPHILS # BLD: 1.5 %
BASOPHILS ABSOLUTE: 0.1 THOU/MM3 (ref 0–0.1)
BUN BLDV-MCNC: 69 MG/DL (ref 7–22)
CALCIUM SERPL-MCNC: 11.1 MG/DL (ref 8.5–10.5)
CHARACTER, BODY FLUID: ABNORMAL
CHLORIDE BLD-SCNC: 108 MEQ/L (ref 98–111)
CO2: 23 MEQ/L (ref 23–33)
COLLECTED BY:: NORMAL
COLOR: ABNORMAL
CREAT SERPL-MCNC: 1.7 MG/DL (ref 0.4–1.2)
CRENATED RBC'S: ABNORMAL
DEVICE: NORMAL
EOSINOPHIL # BLD: 0.3 %
EOSINOPHILS ABSOLUTE: 0 THOU/MM3 (ref 0–0.4)
GFR SERPL CREATININE-BSD FRML MDRD: 29 ML/MIN/1.73M2
GLUCOSE BLD-MCNC: 154 MG/DL (ref 70–108)
GLUCOSE, FLUID: 153 MG/DL
HCO3: 27 MMOL/L (ref 23–28)
HCT VFR BLD CALC: 30.4 % (ref 37–47)
HEMOGLOBIN: 10.5 GM/DL (ref 12–16)
HYPOCHROMIA: ABNORMAL
LD, FLUID: 40 U/L
LYMPHOCYTES # BLD: 3.6 %
LYMPHOCYTES ABSOLUTE: 0.3 THOU/MM3 (ref 1–4.8)
LYMPHOCYTES, BODY FLUID: 54 % (ref 25–100)
MAGNESIUM: 1.9 MG/DL (ref 1.6–2.4)
MCH RBC QN AUTO: 26.7 PG (ref 27–31)
MCHC RBC AUTO-ENTMCNC: 34.6 GM/DL (ref 33–37)
MCV RBC AUTO: 77.3 FL (ref 81–99)
MICROCYTES: ABNORMAL
MONOCYTE, FLUID: 38 % (ref 25–100)
MONOCYTES # BLD: 6.3 %
MONOCYTES ABSOLUTE: 0.6 THOU/MM3 (ref 0.4–1.3)
NUCLEATED RED BLOOD CELLS: 2 /100 WBC
O2 SATURATION: 95 %
PCO2: 41 MMHG (ref 35–45)
PDW BLD-RTO: 23.6 % (ref 11.5–14.5)
PH BLOOD GAS: 7.42 (ref 7.35–7.45)
PH FLUID: 7.1
PLATELET # BLD: 44 THOU/MM3 (ref 130–400)
PLATELET ESTIMATE: ABNORMAL
PMV BLD AUTO: 10 MCM (ref 7.4–10.4)
PO2: 75 MMHG (ref 71–104)
POTASSIUM SERPL-SCNC: 4.8 MEQ/L (ref 3.5–5.2)
PROTEIN FLUID: 0.6 GM/DL
RBC # BLD: 3.93 MILL/MM3 (ref 4.2–5.4)
RBC # BLD: ABNORMAL 10*6/UL
RBC FLUID: 116 /CUMM (ref 0–100)
ROULEAUX: SLIGHT
SCAN OF BLOOD SMEAR: NORMAL
SCHISTOCYTES: ABNORMAL
SEG NEUTROPHILS: 88.3 %
SEGMENTED NEUTROPHILS ABSOLUTE COUNT: 7.8 THOU/MM3 (ref 1.8–7.7)
SEGMENTED NEUTROPHILS, BODY FLUID: 8 % (ref 0–25)
SODIUM BLD-SCNC: 144 MEQ/L (ref 135–145)
SOURCE, BLOOD GAS: NORMAL
SPECIMEN: ABNORMAL
WBC # BLD: 8.8 THOU/MM3 (ref 4.8–10.8)
WBC FLUID: 3 /MM3 (ref 0–500)

## 2017-10-04 PROCEDURE — 80048 BASIC METABOLIC PNL TOTAL CA: CPT

## 2017-10-04 PROCEDURE — 71010 XR CHEST LIMITED ONE VIEW: CPT

## 2017-10-04 PROCEDURE — 83986 ASSAY PH BODY FLUID NOS: CPT

## 2017-10-04 PROCEDURE — 71010 XR CHEST PORTABLE: CPT

## 2017-10-04 PROCEDURE — 95819 EEG AWAKE AND ASLEEP: CPT

## 2017-10-04 PROCEDURE — 84157 ASSAY OF PROTEIN OTHER: CPT

## 2017-10-04 PROCEDURE — 36600 WITHDRAWAL OF ARTERIAL BLOOD: CPT

## 2017-10-04 PROCEDURE — 82945 GLUCOSE OTHER FLUID: CPT

## 2017-10-04 PROCEDURE — 6370000000 HC RX 637 (ALT 250 FOR IP): Performed by: FAMILY MEDICINE

## 2017-10-04 PROCEDURE — 88112 CYTOPATH CELL ENHANCE TECH: CPT

## 2017-10-04 PROCEDURE — 83615 LACTATE (LD) (LDH) ENZYME: CPT

## 2017-10-04 PROCEDURE — 85025 COMPLETE CBC W/AUTO DIFF WBC: CPT

## 2017-10-04 PROCEDURE — 87116 MYCOBACTERIA CULTURE: CPT

## 2017-10-04 PROCEDURE — 99233 SBSQ HOSP IP/OBS HIGH 50: CPT | Performed by: HOSPITALIST

## 2017-10-04 PROCEDURE — 89051 BODY FLUID CELL COUNT: CPT

## 2017-10-04 PROCEDURE — 6360000002 HC RX W HCPCS: Performed by: FAMILY MEDICINE

## 2017-10-04 PROCEDURE — 32555 ASPIRATE PLEURA W/ IMAGING: CPT

## 2017-10-04 PROCEDURE — 36415 COLL VENOUS BLD VENIPUNCTURE: CPT

## 2017-10-04 PROCEDURE — 82803 BLOOD GASES ANY COMBINATION: CPT

## 2017-10-04 PROCEDURE — 87206 SMEAR FLUORESCENT/ACID STAI: CPT

## 2017-10-04 PROCEDURE — 2580000003 HC RX 258: Performed by: INTERNAL MEDICINE

## 2017-10-04 PROCEDURE — 70450 CT HEAD/BRAIN W/O DYE: CPT

## 2017-10-04 PROCEDURE — 99232 SBSQ HOSP IP/OBS MODERATE 35: CPT | Performed by: INTERNAL MEDICINE

## 2017-10-04 PROCEDURE — 6370000000 HC RX 637 (ALT 250 FOR IP): Performed by: ANESTHESIOLOGY

## 2017-10-04 PROCEDURE — 87205 SMEAR GRAM STAIN: CPT

## 2017-10-04 PROCEDURE — 0W9B3ZX DRAINAGE OF LEFT PLEURAL CAVITY, PERCUTANEOUS APPROACH, DIAGNOSTIC: ICD-10-PCS | Performed by: RADIOLOGY

## 2017-10-04 PROCEDURE — 1210000002 HC MED SURG R&B - NEUROSCIENCE

## 2017-10-04 PROCEDURE — 83735 ASSAY OF MAGNESIUM: CPT

## 2017-10-04 PROCEDURE — 87102 FUNGUS ISOLATION CULTURE: CPT

## 2017-10-04 PROCEDURE — 87075 CULTR BACTERIA EXCEPT BLOOD: CPT

## 2017-10-04 PROCEDURE — 87070 CULTURE OTHR SPECIMN AEROBIC: CPT

## 2017-10-04 RX ADMIN — NYSTATIN 500000 UNITS: 100000 SUSPENSION ORAL at 09:23

## 2017-10-04 RX ADMIN — DEXTROSE MONOHYDRATE: 50 INJECTION, SOLUTION INTRAVENOUS at 12:19

## 2017-10-04 RX ADMIN — POTASSIUM CHLORIDE 10 MEQ: 7.46 INJECTION, SOLUTION INTRAVENOUS at 03:34

## 2017-10-04 RX ADMIN — MICONAZOLE NITRATE: 2 POWDER TOPICAL at 09:23

## 2017-10-04 RX ADMIN — POTASSIUM CHLORIDE 10 MEQ: 7.46 INJECTION, SOLUTION INTRAVENOUS at 01:31

## 2017-10-04 RX ADMIN — MICONAZOLE NITRATE: 2 POWDER TOPICAL at 22:20

## 2017-10-04 ASSESSMENT — PAIN SCALES - WONG BAKER
WONGBAKER_NUMERICALRESPONSE: 0
WONGBAKER_NUMERICALRESPONSE: 0

## 2017-10-04 ASSESSMENT — PAIN SCALES - GENERAL: PAINLEVEL_OUTOF10: 0

## 2017-10-04 NOTE — PROGRESS NOTES
Pt is alert and talks about [place]. Attempting to remove gown and heart monitor. Heart monitor paced under pt pillow. Refused dysphagia diet. Attempted to put applesauce to pts mouth and pt would shake her head no. Meds crushed and put in applesauce.   Staff had difficult time getting pt to take her medications very slow given throughout the am.

## 2017-10-04 NOTE — PROGRESS NOTES
[facility] Laboratory Technician Worksheet      EEG Date: 10/4/2017    Name: Bela Villalobos   : 1940   Age: 68 y.o.   SEX: female    ROOM: 18 MRN: [id number]           CSN: [id number]    Ordering Provider: Lisa Chacon  EEG Number: [id number] Time of Test:  3470    Hand: -   Sedation: no    H.V. Done: No  not attempted Photic: No    Sleep: Yes  Drowsy: No   Sleep Deprived: No    Seizures observed:  Clinically pt had subtle (Parkinson type) head movements, side to side    Technician Impression:3    Mentality: obtunded      Clinical History:  DX:  AMS   Lethargy   Confusion   R/O AFIB with CVA   Encephalopathy    Bilateral subacute ischemic infarct   H/O GIST tumor s/p resection   17 abn EEG due to excessive slowing    Head CT shows no acute process   Stable from previous    Past Medical History:       Diagnosis Date    Anemia     Arthritis     Blood transfusion reaction     Cancer ([address]     colon in polyps    Cirrhosis ([facility])     biliary    Heart murmur     Hernia     Hypertension     Thyroid disease     Unspecified diseases of blood and blood-forming organs     anemia       Scheduled Meds:   amoxicillin-clavulanate  1 tablet Oral 2 times per day    metoprolol tartrate  25 mg Oral BID    miconazole   Topical BID    nystatin  5 mL Oral 4x Daily    potassium replacement protocol   Other RX Placeholder    vitamin D  5,000 Units Oral Daily    rifaximin  550 mg Oral BID    levothyroxine  112 mcg Oral Daily    ursodiol  300 mg Oral BID    famotidine  20 mg Oral Daily    nicotine  1 patch Transdermal Daily    phosphorus replacement protocol   Other RX Placeholder    sodium chloride flush  10 mL Intravenous 2 times per day     Continuous Infusions:   dextrose 50 mL/hr at 10/04/17 1219     PRN Meds:.melatonin, haloperidol lactate, acetaminophen, magnesium hydroxide, ondansetron, potassium chloride, magnesium sulfate, labetalol, sodium chloride flush    Technician: Regino Marshall 10/4/2017

## 2017-10-04 NOTE — BRIEF OP NOTE
Post Procedure Progress Note    10/4/2017  Pre-Procedure Diagnosis: Left pleural effusion  Post-Procedure Diagnosis: Same  Physician: Lizzeth Alonso MD  Anesthesia: 2% lidocaine local  Procedure Performed: Ultrasound guided left thoracentesis  Specimen Removed: 0.4 liters clear yellow pleural fluid  Disposition of Specimen: 83 ml specimen sent to the lab  Estimated Blood Loss: None  Complications: None

## 2017-10-04 NOTE — PROGRESS NOTES
Pallative care here to talk with pts son. Son is tearful and stated, \" I want to do what best for my mom. \" Reassured. Son tells staff he cant believe how bad his mother is looking. She is full of fluid and confused. Family meeting tomorrow for POC on d/c.

## 2017-10-04 NOTE — PROGRESS NOTES
Pt is resting with eyes closed. Respirations are easy. IV infusing asper order. Lungs diminished throughout. During assessment pt did not wake.

## 2017-10-04 NOTE — FLOWSHEET NOTE
During my contact with the Palliative Care patient, there were no family members present and no response. I offered a significant prayer of comfort and healing at pts bedside. I also placed a spiritual care card in the room. Spiritual plan: It would be helpful if Spiritual Care would continue to follow up on this case.       10/04/17 0934   Encounter Summary   Services provided to: Patient   Referral/Consult From: [facility] Family members   Continue Visiting Yes  (10/4/17 Palliative Care pt. )   Complexity of Encounter Moderate   Length of Encounter 15 minutes   Routine   Type Follow up   Assessment Sleeping   Intervention Prayer   Outcome Did not respond   Spiritual/Uats[place]   Type Spiritual support

## 2017-10-04 NOTE — PROGRESS NOTES
1530: Met with pt daughter Vladislav Espino and son Donnell Perry to review pt status and goals of care. Dr. Jesslyn Gitelman also present and reviewed pt medical status and issues of concern with family. Pt thus far today has not been very responsive and also urine output is low. Pt also had thoracentesis earlier today after chest xray reviewed and oxygenation concerns. Again reviewed with family that despite all the aggressive medical treatments provided for pt she is not responding and showing improvement. Family acknowledges that pt overall status continues to wax and wane. We reviewed options for care including comfort care. Daughter again states that her mother would not want any aggressive measures including feeding tube. Family requests information on comfort care/hospice. [facility] RN present and will meet with family and give information. Will continue to follow and support pt/family.

## 2017-10-04 NOTE — PLAN OF CARE
Problem: Falls - Risk of  Goal: Absence of falls  Outcome: Ongoing  Patient on fall precautions. Falling star magnet on door. Bed in lowest position. Fall band intact. Call light within reach at all times. Patient educated to not get up per self to reduce falls. Problem: Nutrition  Goal: Optimal nutrition therapy  Outcome: Ongoing  Patient not eating at this time. Very drowsy. Problem: Risk for Impaired Skin Integrity  Goal: Tissue integrity - skin and mucous membranes  Structural intactness and normal physiological function of skin and  mucous membranes. Outcome: Ongoing  Patient excoriated throughout groin. Problem: Discharge Planning:  Goal: Discharged to appropriate level of care  Discharged to appropriate level of care   Outcome: Ongoing  Patient plans on ECF at discharge. Comments:   Care plan reviewed with patient. Patient does not verbalize understanding of the plan of care and contribute to goal setting.

## 2017-10-04 NOTE — PROGRESS NOTES
Hospitalist addendum    Held family meeting today regarding patients decline. Urine output dismal, pt more lethargic. S/p thoracentesis today. Family acknowledged her wishes for no invasive measures and comfort care. Family requesting hospice consult and for patient to be moved to [facility] closer to them. Hospice meeting today  Will discuss with  in AM about transfer if patient is stable.     Maryana Guardado MD

## 2017-10-04 NOTE — PROGRESS NOTES
Progress Note    Patient:  Palma Rangel    Unit/Bed:4A-05/005-A  YOB: 1940  MRN: [id number]   Acct: [de-identified]   Admit date: 9/15/2017      Principal Problem:    Acute hepatic encephalopathy  Active Problems:    Essential hypertension    Cirrhosis of liver with ascites (HCC)    Hypothyroidism    History of atrial fibrillation    Primary biliary cirrhosis (HCC)    Hypertensive urgency    Urethrovaginal fistula    Pleural effusion    Thrombocytopenia (HCC)    Microcytic anemia    Hyperbilirubinemia    Hypokalemia    Urinary retention    Hypercalcemia, not POA    Acute renal failure with tubular necrosis (HCC)    Pneumonia, organism unspecified    BEN (acute kidney injury) ([facility] [address]    Dehydration    Acquired hypothyroidism    Hypovitaminosis D    Hepatic encephalopathy (HCC)    Hypothermia, not POA    Sludge in gallbladder    Hyperparathyroidism (HCC)    Bilateral edema of lower extremity    Metabolic acidosis    Severe malnutrition (HCC)    Ac isch multifocal ant circ stroke (HCC)    Dysphagia    Iron deficiency anemia    Physical deconditioning    Hypernatremia      Assessment and Plan:  1. Acute hypoxemic respiratory failure: Complete opacification of left lung. D/w daughter Vladislav Espino over phone and obtained consent for thoracentesis. Appreciate IR assistance, pt to go down immediately for tap. Will check fluid analysis. Wean O2. ABG surprisingly looks good  2. Bilateral Subacute ischemic infarcts concerning for embolic even. Too high risk for [address] [facility] when platelets imprvove. Discussed with cardio can consider for watchmen procedure as outpatient when recovered from infection. 3. Acute hepatic encephalopathy: recheck ammonia level continue with lactulose. Very lethargic. Ammonia levels have been normal.  PCO2 on ABG also normal.  CT brain  4. Primary biliary cirrhosis  5. Weakness: PT/OT  6. Candida UTI  7. Aspiration pneumonia: ID on board. Continue with Augmentin  8.  Leukocytosis: from prior. **This report has been created using voice recognition software. It may contain minor errors which are inherent in voice recognition technology. ** Final report electronically signed by Dr. Caren Vazquez on 9/19/2017 9:55 AM    Ct Head Wo Contrast    Result Date: 9/24/2017  PROCEDURE: CT HEAD WO CONTRAST CLINICAL INFORMATION: CONFUSION/DELIRIUM, ALTERED LOC, UNEXPLAINED,  COMPARISON: 6/25/2013 TECHNIQUE:  Axial CT images were obtained through the head without contrast. All CT scans at this facility use dose modulation, iterative reconstruction, and/or weight-based dosing when appropriate to reduce radiation dose to as low as reasonably achievable. FINDINGS: No acute intracranial hemorrhage or mass effect is seen. There is no midline shift. There is moderate patchy periventricular and subcortical white matter hypoattenuation demonstrated likely representing sequela from moderate chronic small vessel skin changes. This appears progressed when compared to the previous examination from June 2013. The basal cisterns appear within normal limits. The posterior fossa appears unremarkable. There is a rounded opacity posteriorly within the left maxillary sinus inferiorly. This is incompletely visualized but may represent a mucous retention cyst or polyp. The remaining visualized paranasal sinuses appear clear. No acute remote is of the calvarium are seen. The visualized globes and orbits appear  grossly intact. Parasellar carotid artery calcification is noted. There is mild diffuse atrophy. 1. No acute intracranial hemorrhage or mass effect. 2. Moderate chronic small vessel ischemic changes. **This report has been created using voice recognition software. It may contain minor errors which are inherent in voice recognition technology. ** Final report electronically signed by Dr. Chung Osman on 9/24/2017 9:49 AM    Ct Chest Wo Contrast    Result Date: 9/17/2017  PROCEDURE: CT CHEST WO CONTRAST CLINICAL INFORMATION: Pleural effusion, shortness of breath TECHNIQUE: CT of the chest was performed without the use of intravenous contrast. Axial images as well as coronal and sagittal reconstructions were obtained. All CT scans at this facility use dose modulation, iterative reconstruction, and/or weight-based dosing when appropriate to reduce radiation dose to as low as reasonably achievable. COMPARISON: CTA chest 8/28/2013 FINDINGS: There is stable cardiac enlargement. Atherosclerotic calcifications are present in the thoracic aorta and coronary arteries without evidence of aneurysm. Calcifications of the mitral annulus are again noted. There is no pericardial effusion. There is a small right-sided pleural effusion. Alveolar and reticular opacities are present in the right lung. There are scattered consolidations of the right middle lobe. Minimal reticular opacities are seen in the left lower lobe. There is no mediastinal, hilar or axillary lymphadenopathy. Multiple calcifications are noted in the bilateral breasts. Degenerative and scoliotic changes are present in the thoracolumbar spine without evidence of aggressive osseous lesions. Nodularity of the liver is likely secondary to cirrhosis. The caudate lobe is enlarged. There is a ventral hernia which contains the distal colon and a portion of proximal duodenum. Anasarca is present. 1. Small right-sided pleural effusion. 2. Bilateral pneumonia, worse on the right side. 3. Stable cardiomegaly. 4. Cirrhosis. 5. Ventral hernia containing part of the stomach and proximal small bowel. Final report electronically signed by Dr. Jose Angel Dumont on 9/17/2017 4:20 PM    Nm Hepatobiliary    Result Date: 9/21/2017  PROCEDURE: NM HEPATOBILIARY CLINICAL INFORMATION: Epigastric pain Radiopharmaceutical: 8.9 mCi technetium 99m mebrofenin, intravenously. TECHNIQUE: Anterior images of the abdomen were obtained for 60 minutes following radiopharmaceutical administration.  Additional to patient condition. Liver Liver is somewhat decreased in size. No focal mass or intrahepatic ductal dilatation is seen. There are multiple tortuous vessels at the new hepatis. There is possible recannulated the umbilicus vein. There are multiple hypoechoic masses at the new hepatis indicating periportal pericaval adenopathy. Some of these are as large as 9.7 cm. The gallbladder shows no evidence of stones there is gallbladder wall thickening and pericholecystic edema and sludge within the gallbladder. Common bile duct is not enlarged. 1. Cirrhotic liver with possible recannulated emboli: Vein. 2. Marked periportal pericaval adenopathy. Follow-up with CT scan is recommended. 3. Gallbladder sludge with gallbladder wall thickening and pericholecystic edema. Acalculous cholecystitis is a possibility. **This report has been created using voice recognition software. It may contain minor errors which are inherent in voice recognition technology. ** Final report electronically signed by Dr. Janis Sal on 9/15/2017 7:23 PM    Us Renal Limited    Result Date: 9/22/2017  PROCEDURE: US RENAL LIMITED CLINICAL INFORMATION: Acute kidney injury TECHNIQUE: Ultrasound of the kidneys and urinary bladder was performed. Grayscale and color Doppler images were obtained. COMPARISON: None FINDINGS: The right kidney measures 8.9 x 4.8 x 3.8 cm and the left kidney measures 9.0 x 4.9 x 4.1 cm. Renal cortical thickness is normal bilaterally. The renal cortex on the right side is slightly echogenic. There is no evidence of hydronephrosis, calculi or intrarenal mass on either side. The sonographer was unable to obtain arcuate resistive indices. There is a Aguilar catheter in the urinary bladder which cannot be evaluated on the current study. A right-sided pleural effusion is incompletely visualized on the current study.      1. Echogenic right renal cortex, possibly secondary to medical renal disease, but otherwise unremarkable renal cannot be excluded. The right upper lung zones are clear. 2. There is new opacification obscuring the retrocardiac left lung base with the differential including pleural fluid and atelectasis/infiltrates. The left lung fields are otherwise clear. OTHER: None. PNEUMOTHORAX:  None. OSSEOUS STRUCTURES: 1. No acute osseous abnormality. 2. Generalized osteopenia. 1. There is new elevation of the right hemidiaphragm to the level of the right hilum. Underlying pleural fluid and atelectasis/infiltrates cannot be excluded. The right upper lung zones are clear. 2. There is new opacification obscuring the retrocardiac left lung base with the differential including pleural fluid and atelectasis/infiltrates. The left lung fields are otherwise clear. **This report has been created using voice recognition software. It may contain minor errors which are inherent in voice recognition technology. ** Final report electronically signed by Dr. Sarah Jenkins on 9/28/2017 8:16 AM    Xr Chest Portable    Result Date: 9/21/2017  PROCEDURE: XR CHEST PORTABLE CLINICAL INFORMATION: possible pneumonia, COMPARISON: 9/19/2017 TECHNIQUE: A single mobile view of the chest was obtained. 1. Mild cardiomegaly Moderate calcification of the mitral annulus. 2. Mild bibasilar atelectasis/pneumonia. Overall appearance of chest slightly worse than prior. **This report has been created using voice recognition software. It may contain minor errors which are inherent in voice recognition technology. ** Final report electronically signed by Dr. Arianne Van on 9/21/2017 2:25 PM    Xr Chest Portable    Result Date: 9/15/2017  PROCEDURE: XR CHEST PORTABLE CLINICAL INFORMATION: NG tube placement,  . COMPARISON: 9/15/2017 at 1740 TECHNIQUE: Portable supine FINDINGS: NG tube is been advanced and is well into the stomach. Distal tip is not included on the image. There are no other changes.      NG tube is well into the stomach **This report has been created using voice recognition software. It may contain minor errors which are inherent in voice recognition technology. ** Final report electronically signed by Dr. Jorden Lovelace on 9/15/2017 7:10 PM    Xr Chest Portable    Result Date: 9/15/2017  PROCEDURE: XR CHEST PORTABLE CLINICAL INFORMATION: NG placement,  . COMPARISON: 9/15/2017 TECHNIQUE: Portable supine FINDINGS: NG tube extends to the distal esophagus. It needs to be advanced 7 cm to be in the proximal stomach. There are no other changes. NG tube in the distal esophagus **This report has been created using voice recognition software. It may contain minor errors which are inherent in voice recognition technology. ** Final report electronically signed by Dr. Jorden Lovelace on 9/15/2017 6:10 PM    Xr Chest Portable    Result Date: 9/15/2017  PROCEDURE: XR CHEST PORTABLE CLINICAL INFORMATION: AMS, . COMPARISON: PA and lateral chest x-ray December 5, 2015. TECHNIQUE: AP upright view of the chest. FINDINGS: The heart size remains mildly enlarged. The mediastinum is not widened. There is mildly increased density in the right lower lobe mostly in the right infrahilar region. There is obscuration to moderate degree the right lateral costophrenic angle. The rest of the lungs are clear. The pulmonary vascularity is normal. No suspicious osseous lesions are present. 1.  Findings consistent with small to moderate size right pleural effusion with underlying atelectasis. Also in the differential would be early infiltrate but this is less favored. Recommend a true PA and lateral chest x-ray preferably in the x-ray department if patient can tolerate. **This report has been created using voice recognition software. It may contain minor errors which are inherent in voice recognition technology. ** Final report electronically signed by Dr. Afsaneh Hyatt on 9/15/2017 9:14 AM    Vl Carotid Bilateral    Result Date: 9/29/2017  PROCEDURE: Bilateral carotid ultrasound CLINICAL the right frontal lobe. Correlation of findings indicate multiple foci of subacute ischemic infarctions involving the anterior and posterior circulation distributions. This could represent watershed distribution in some areas, though not clearly defining watershed regions. Explanation could represent embolic phenomenon, though not strongly suggestive of sides. Hyperintense T2 abnormalities are present in the white matter of both cerebral hemispheres, confluent, symmetric. Minimal amounts of hyperintense T2 abnormality also present in the pontine white matter. Evidence of remote lacunar infarction of the right cerebellum. There is suspicious for remote lacunar infarction of the left thalamus. No definite sequelae of infarction of the basal ganglia though this is difficult to confirm. No definite gliosis involving the cerebral cortex of either hemisphere. Limited detail of the cervical canal and spinal cord and sella show no findings of acute abnormality. Some canal stenosis at C4-5 evident. The paranasal sinuses show no concerning characteristics, though there is likely mucosal retention cyst of the left maxillary sinus and minimal mucosal thickening of the left more than right ethmoid air cells and sphenoid sinuses. There is nonspecific fluid signal in the left mastoid air cells, and middle ear cavities been clear bilaterally. Vascular structures grossly show nothing concerning. SUBACUTE ISCHEMIC INFARCTIONS INVOLVING BOTH ANTERIOR AND POSTERIOR CIRCULATION DISTRIBUTIONS. THESE ARE SMALL FOCI, CLUSTERED IN THE LEFT FRONTAL LOBE, WITH LESS IMPRESSIVE INVOLVEMENT OF THE RIGHT FRONTAL LOBE AND BILATERAL OCCIPITAL LOBES AND THE LEFT PARIETAL LOBE. SUBACUTE ISCHEMIC INFARCTIONS OF LIKELY DIFFERENT AGE IN THE LEFT CEREBELLUM. NONE HAVE ASSOCIATED BLOOD PRODUCTS. REMOTE MICROHEMORRHAGE IN THE RIGHT FRONTAL LOBE. MODERATE SMALL VESSEL DISEASE SEQUELAE. MILD PARANASAL SINUS DISEASE.  FINDINGS DISCUSSED WITH THE [facility] NURSING STAFF AT THE TIME OF THE INTERPRETATION, 1556 HOURS ON 28 SEPTEMBER 2017. **This report has been created using voice recognition software. It may contain minor errors which are inherent in voice recognition technology. ** Final report electronically signed by Dr. Stefania Pardo on 9/28/2017 4:01 PM    Fluoroscopy Modified Barium Swallow With Video    Result Date: 9/29/2017  PROCEDURE: FL MODIFIED BARIUM SWALLOW W VIDEO CLINICAL INFORMATION: Dysphasia TECHNIQUE: Fluoroscopy was provided for modified barium swallowing study performed by speech therapy. With the patient in the lateral projection, swallowing mechanism was evaluated using barium of various consistencies. The radiologist was present for the entire examination. One spot image was obtained. Total fluoroscopy time 1.2 minutes. Speech therapist: Tish Maxwell COMPARISON: None. FINDINGS: Oral, pharyngeal and esophageal structures appear normal. There is laryngeal penetration of thin barium. No aspiration is identified. 1. Laryngeal penetration of thin barium without evidence of aspiration. 2. Additional recommendations from the speech therapist will follow. **This report has been created using voice recognition software. It may contain minor errors which are inherent in voice recognition technology. ** Final report electronically signed by Dr. Laron Alejandre on 9/29/2017 9:58 AM    Nm Parathyroid W Spect/ct    Result Date: 9/26/2017  PROCEDURE: NM PARATHYROID PLANAR W/SPECT & CT CLINICAL INFORMATION: Hypercalcemia. Radiopharmaceutical: 27.8 mCi technetium 99m sestamibi, intravenously. TECHNIQUE: Anterior planar images of the neck were obtained immediately and at 2 and 4 hours following radiopharmaceutical administration. Additional SPECT imaging was performed at 2 and 4 hours with transaxial, coronal and sagittal projections. Low-dose  CT was acquired for attenuation correction and localization only.  COMPARISON: None FINDINGS: Immediate images show homogeneous distribution of radiotracer throughout the thyroid gland. There is mild diffuse radiotracer accumulation in the gland at 2 hours with minimal radiotracer at 4 hours. There are no discrete foci of abnormal activity. Poor washout of radiotracer from the thyroid gland without evidence of parathyroid adenoma. Final report electronically signed by Dr. Elisha Muse on 9/26/2017 2:21 PM        Physical Exam:  Vitals: BP (!) 115/58  Pulse 64  Temp 94.4 °F (34.7 °C) (Oral)   Resp 20  Ht 5' 1\" (1.549 m)  Wt 176 lb 1 oz (79.9 kg)  SpO2 92%  BMI 33.27 kg/m2  24 hour intake/output:    Intake/Output Summary (Last 24 hours) at 10/04/17 1049  Last data filed at 10/04/17 0409   Gross per 24 hour   Intake          1131.67 ml   Output              275 ml   Net           856.67 ml     Last 3 weights: Wt Readings from Last 3 Encounters:   10/04/17 176 lb 1 oz (79.9 kg)   02/16/17 131 lb (59.4 kg)   12/10/15 122 lb (55.3 kg)       General appearance - lethargic non verbal  HEENT: Atraumatic normocephalic, no JVD. Trachea midline.  Pupils equal round and reactive  Chest -diminished   Cardiovascular - S1S2 RRR, no murmurs or gallops  Abdomen - Soft non tender non distended, normoactive bowel sounds   Integumentary - Skin color, texture, turgor normal. No Rashes or lesions  Musculoskeletal -Full ROM, No clubbing or cyanosis  Extremities:  peripheral edema    DVT prophylaxis: [] Lovenox                                 [x] SCDs                                 [] SQ Heparin                                 [] Encourage ambulation           [] Already on Anticoagulation               Electronically signed by Jen Corona MD on 10/4/2017 at 10:49 AM    Rounding Hospitalist  [phone]

## 2017-10-04 NOTE — PROGRESS NOTES
Assisted pt to get pt up to side of bed. Pt was flopy and unable to sit without falling backwards. Staff assisted the pt to lay down. No control over body. Rabia Elaine

## 2017-10-04 NOTE — PROGRESS NOTES
[address] [facility] [address] 60  PHYSICAL THERAPY MISSED TREATMENT NOTE  ACUTE CARE    Date: 10/4/2017  Patient Name: Lissett Mir        MRN: [id number]   : 1940  (68 y.o.)  Gender: female   Referring Practitioner: Dr. Stephane Whitman MD  Referring Practitioner: Katty Stahl  Diagnosis: Hypertensive Urgency  Diagnosis: hypertensive urgency         REASON FOR MISSED TREATMENT:  Nursing ok'd therapy but stated that they haven't been able to get pt to wake up since yesterday afternoon. Pt resting in bed with eyes closed she would semi open her eyes at times but not fully and had her head positioned in left side bending and rotated to the left attempted to place pillow for positioning. Pt not following any commands and unable to get pt to participate in therapy session.

## 2017-10-05 VITALS
SYSTOLIC BLOOD PRESSURE: 126 MMHG | DIASTOLIC BLOOD PRESSURE: 58 MMHG | HEART RATE: 73 BPM | OXYGEN SATURATION: 91 % | BODY MASS INDEX: 33.53 KG/M2 | TEMPERATURE: 97 F | RESPIRATION RATE: 17 BRPM | WEIGHT: 177.6 LBS | HEIGHT: 61 IN

## 2017-10-05 LAB
ANISOCYTOSIS: ABNORMAL
BASOPHILS # BLD: 0.1 %
BASOPHILS ABSOLUTE: 0 THOU/MM3 (ref 0–0.1)
CRENATED RBC'S: ABNORMAL
EOSINOPHIL # BLD: 0.2 %
EOSINOPHILS ABSOLUTE: 0 THOU/MM3 (ref 0–0.4)
HCT VFR BLD CALC: 30.6 % (ref 37–47)
HEMOGLOBIN: 9.8 GM/DL (ref 12–16)
HYPOCHROMIA: ABNORMAL
LYMPHOCYTES # BLD: 7.3 %
LYMPHOCYTES ABSOLUTE: 0.5 THOU/MM3 (ref 1–4.8)
MCH RBC QN AUTO: 25.4 PG (ref 27–31)
MCHC RBC AUTO-ENTMCNC: 32 GM/DL (ref 33–37)
MCV RBC AUTO: 79.2 FL (ref 81–99)
MICROCYTES: ABNORMAL
MONOCYTES # BLD: 6.8 %
MONOCYTES ABSOLUTE: 0.4 THOU/MM3 (ref 0.4–1.3)
NUCLEATED RED BLOOD CELLS: 0 /100 WBC
OVALOCYTES: ABNORMAL
PDW BLD-RTO: 24.6 % (ref 11.5–14.5)
PLATELET # BLD: 44 THOU/MM3 (ref 130–400)
PLATELET ESTIMATE: ABNORMAL
PMV BLD AUTO: 9.8 MCM (ref 7.4–10.4)
RBC # BLD: 3.86 MILL/MM3 (ref 4.2–5.4)
RBC # BLD: ABNORMAL 10*6/UL
ROULEAUX: SLIGHT
SCAN OF BLOOD SMEAR: NORMAL
SCHISTOCYTES: ABNORMAL
SEG NEUTROPHILS: 85.6 %
SEGMENTED NEUTROPHILS ABSOLUTE COUNT: 5.4 THOU/MM3 (ref 1.8–7.7)
WBC # BLD: 6.3 THOU/MM3 (ref 4.8–10.8)

## 2017-10-05 PROCEDURE — 36415 COLL VENOUS BLD VENIPUNCTURE: CPT

## 2017-10-05 PROCEDURE — 99239 HOSP IP/OBS DSCHRG MGMT >30: CPT | Performed by: HOSPITALIST

## 2017-10-05 PROCEDURE — 2580000003 HC RX 258: Performed by: INTERNAL MEDICINE

## 2017-10-05 PROCEDURE — 6370000000 HC RX 637 (ALT 250 FOR IP): Performed by: FAMILY MEDICINE

## 2017-10-05 PROCEDURE — A6446 CONFORM BAND S W>=3" <5"/YD: HCPCS

## 2017-10-05 PROCEDURE — 2580000003 HC RX 258: Performed by: NURSE PRACTITIONER

## 2017-10-05 PROCEDURE — 6370000000 HC RX 637 (ALT 250 FOR IP): Performed by: ANESTHESIOLOGY

## 2017-10-05 PROCEDURE — 85025 COMPLETE CBC W/AUTO DIFF WBC: CPT

## 2017-10-05 RX ORDER — MORPHINE SULFATE 20 MG/5ML
2.5 SOLUTION ORAL EVERY 4 HOURS PRN
Qty: 30 ML | Refills: 0
Start: 2017-10-05 | End: 2017-10-15

## 2017-10-05 RX ORDER — LORAZEPAM 2 MG/ML
0.5 CONCENTRATE ORAL
Qty: 30 ML | Refills: 0
Start: 2017-10-05 | End: 2017-10-19

## 2017-10-05 RX ADMIN — DEXTROSE MONOHYDRATE: 50 INJECTION, SOLUTION INTRAVENOUS at 08:52

## 2017-10-05 RX ADMIN — Medication 10 ML: at 08:54

## 2017-10-05 RX ADMIN — MICONAZOLE NITRATE: 2 POWDER TOPICAL at 08:52

## 2017-10-05 RX ADMIN — NYSTATIN 500000 UNITS: 100000 SUSPENSION ORAL at 08:54

## 2017-10-05 ASSESSMENT — PAIN SCALES - GENERAL: PAINLEVEL_OUTOF10: 0

## 2017-10-05 ASSESSMENT — PAIN SCALES - WONG BAKER: WONGBAKER_NUMERICALRESPONSE: 0

## 2017-10-05 NOTE — DISCHARGE SUMMARY
intake/output:  Intake/Output Summary (Last 24 hours) at 10/05/17 1024  Last data filed at 10/05/17 0400   Gross per 24 hour   Intake          1012.75 ml   Output              125 ml   Net           887.75 ml       General appearance - nonverbal  Chest - diminished breath sounds  Heart - S1S2 RRR, no murmurs or gallops  Abdomen - soft, non tender, normoactive bowel sounds  Extremities - no edema  Skin - no rashes or lesions    Procedures:  thoracentesis    Diagnostic Test:  MRI brain    Radiology reports as per the Radiologist  Radiology:  Xr Chest Standard Two Vw    Result Date: 9/19/2017  PROCEDURE: XR CHEST STANDARD TWO VW CLINICAL INFORMATION: pleural effusion, COMPARISON: 9/15/2017 TECHNIQUE: AP upright and lateral views of the chest were obtained. 1. Mild cardiomegaly. 2. Small bilateral pleural effusions. 3. Mild bibasilar atelectasis/pneumonia. 4. Overall appearance of chest improved from prior. **This report has been created using voice recognition software. It may contain minor errors which are inherent in voice recognition technology. ** Final report electronically signed by Dr. Savanah Ireland on 9/19/2017 9:55 AM    Ct Head Wo Contrast    Result Date: 10/4/2017  PROCEDURE: CT HEAD WO CONTRAST CLINICAL INFORMATION: altered mental status lethargic, confusion COMPARISON: 9/24/2017 TECHNIQUE: Multiple axial spiral images were obtained from the base of the skull to the vertex without intravenous contrast enhancement and imaged in bone and soft tissue windows. FINDINGS: The ventricles and sulci are stable with mild age-related atrophic changes again noted. There are periventricular white matter hypodensities, a nonspecific finding but possibly related to microvascular ischemic disease, stable from the previous  study. There is no acute hemorrhage or territorial infarct. There is no midline shift or mass effect. There are no abnormal extra-axial fluid collections.  There is a mucous retention cyst or polyp in the posterior left maxillary sinus. The remainder the  visualized paranasal sinuses and mastoid air cells are well pneumatized. Carotid siphon calcifications are noted. No acute intracranial findings, stable from the previous study of 9/24/2017. **This report has been created using voice recognition software. It may contain minor errors which are inherent in voice recognition technology. ** Final report electronically signed by Dr. Taniya Bonner on 10/4/2017 10:18 AM    Ct Head Wo Contrast    Result Date: 9/24/2017  PROCEDURE: CT HEAD WO CONTRAST CLINICAL INFORMATION: CONFUSION/DELIRIUM, ALTERED LOC, UNEXPLAINED,  COMPARISON: 6/25/2013 TECHNIQUE:  Axial CT images were obtained through the head without contrast. All CT scans at this facility use dose modulation, iterative reconstruction, and/or weight-based dosing when appropriate to reduce radiation dose to as low as reasonably achievable. FINDINGS: No acute intracranial hemorrhage or mass effect is seen. There is no midline shift. There is moderate patchy periventricular and subcortical white matter hypoattenuation demonstrated likely representing sequela from moderate chronic small vessel skin changes. This appears progressed when compared to the previous examination from June 2013. The basal cisterns appear within normal limits. The posterior fossa appears unremarkable. There is a rounded opacity posteriorly within the left maxillary sinus inferiorly. This is incompletely visualized but may represent a mucous retention cyst or polyp. The remaining visualized paranasal sinuses appear clear. No acute remote is of the calvarium are seen. The visualized globes and orbits appear  grossly intact. Parasellar carotid artery calcification is noted. There is mild diffuse atrophy. 1. No acute intracranial hemorrhage or mass effect. 2. Moderate chronic small vessel ischemic changes. **This report has been created using voice recognition software.   It may contain minor errors which are inherent in voice recognition technology. ** Final report electronically signed by Dr. Noel Garcia on 9/24/2017 9:49 AM    Ct Chest Wo Contrast    Result Date: 9/17/2017  PROCEDURE: CT CHEST WO CONTRAST CLINICAL INFORMATION: Pleural effusion, shortness of breath TECHNIQUE: CT of the chest was performed without the use of intravenous contrast. Axial images as well as coronal and sagittal reconstructions were obtained. All CT scans at this facility use dose modulation, iterative reconstruction, and/or weight-based dosing when appropriate to reduce radiation dose to as low as reasonably achievable. COMPARISON: CTA chest 8/28/2013 FINDINGS: There is stable cardiac enlargement. Atherosclerotic calcifications are present in the thoracic aorta and coronary arteries without evidence of aneurysm. Calcifications of the mitral annulus are again noted. There is no pericardial effusion. There is a small right-sided pleural effusion. Alveolar and reticular opacities are present in the right lung. There are scattered consolidations of the right middle lobe. Minimal reticular opacities are seen in the left lower lobe. There is no mediastinal, hilar or axillary lymphadenopathy. Multiple calcifications are noted in the bilateral breasts. Degenerative and scoliotic changes are present in the thoracolumbar spine without evidence of aggressive osseous lesions. Nodularity of the liver is likely secondary to cirrhosis. The caudate lobe is enlarged. There is a ventral hernia which contains the distal colon and a portion of proximal duodenum. Anasarca is present. 1. Small right-sided pleural effusion. 2. Bilateral pneumonia, worse on the right side. 3. Stable cardiomegaly. 4. Cirrhosis. 5. Ventral hernia containing part of the stomach and proximal small bowel.  Final report electronically signed by Dr. Charmayne Beverage on 9/17/2017 4:20 PM    Nm Hepatobiliary    Result Date: 9/21/2017  PROCEDURE: NM HEPATOBILIARY CLINICAL INFORMATION: Epigastric pain Radiopharmaceutical: 8.9 mCi technetium 99m mebrofenin, intravenously. TECHNIQUE: Anterior images of the abdomen were obtained for 60 minutes following radiopharmaceutical administration. Additional images were then obtained in multiple projections. COMPARISON: None FINDINGS: There is normal hepatic uptake of radiotracer. Activity is present in the gallbladder by 19 minutes. There is activity in the common bile duct and small bowel by 29 minutes. Patent cystic and common bile ducts without evidence of acute cholecystitis. Final report electronically signed by Dr. Aly Doty on 9/21/2017 4:39 PM    Us Liver    Result Date: 9/15/2017  PROCEDURE: US GALLBLADDER RUQ, US LIVER CLINICAL INFORMATION: CIRRHOSIS, HEPATITIS,  . COMPARISON: No prior study. TECHNIQUE: Grayscale and color Doppler ultrasound FINDINGS: Exam is somewhat limited due to patient condition. Liver Liver is somewhat decreased in size. No focal mass or intrahepatic ductal dilatation is seen. There are multiple tortuous vessels at the new hepatis. There is possible recannulated the umbilicus vein. There are multiple hypoechoic masses at the new hepatis indicating periportal pericaval adenopathy. Some of these are as large as 9.7 cm. The gallbladder shows no evidence of stones there is gallbladder wall thickening and pericholecystic edema and sludge within the gallbladder. Common bile duct is not enlarged. 1. Cirrhotic liver with possible recannulated emboli: Vein. 2. Marked periportal pericaval adenopathy. Follow-up with CT scan is recommended. 3. Gallbladder sludge with gallbladder wall thickening and pericholecystic edema. Acalculous cholecystitis is a possibility. **This report has been created using voice recognition software. It may contain minor errors which are inherent in voice recognition technology. ** Final report electronically signed by Dr. Jeane Hoffman on 9/15/2017 7:23 PM    Us Gallbladder Ruq    Result Date: 9/15/2017  PROCEDURE: US GALLBLADDER RUQ, US LIVER CLINICAL INFORMATION: CIRRHOSIS, HEPATITIS,  . COMPARISON: No prior study. TECHNIQUE: Grayscale and color Doppler ultrasound FINDINGS: Exam is somewhat limited due to patient condition. Liver Liver is somewhat decreased in size. No focal mass or intrahepatic ductal dilatation is seen. There are multiple tortuous vessels at the new hepatis. There is possible recannulated the umbilicus vein. There are multiple hypoechoic masses at the new hepatis indicating periportal pericaval adenopathy. Some of these are as large as 9.7 cm. The gallbladder shows no evidence of stones there is gallbladder wall thickening and pericholecystic edema and sludge within the gallbladder. Common bile duct is not enlarged. 1. Cirrhotic liver with possible recannulated emboli: Vein. 2. Marked periportal pericaval adenopathy. Follow-up with CT scan is recommended. 3. Gallbladder sludge with gallbladder wall thickening and pericholecystic edema. Acalculous cholecystitis is a possibility. **This report has been created using voice recognition software. It may contain minor errors which are inherent in voice recognition technology. ** Final report electronically signed by Dr. Velvet Romero on 9/15/2017 7:23 PM    Us Renal Limited    Result Date: 9/22/2017  PROCEDURE: US RENAL LIMITED CLINICAL INFORMATION: Acute kidney injury TECHNIQUE: Ultrasound of the kidneys and urinary bladder was performed. Grayscale and color Doppler images were obtained. COMPARISON: None FINDINGS: The right kidney measures 8.9 x 4.8 x 3.8 cm and the left kidney measures 9.0 x 4.9 x 4.1 cm. Renal cortical thickness is normal bilaterally. The renal cortex on the right side is slightly echogenic. There is no evidence of hydronephrosis, calculi or intrarenal mass on either side. The sonographer was unable to obtain arcuate resistive indices.  There is a Aguilar catheter in the urinary Portable    Result Date: 9/29/2017  PROCEDURE: XR CHEST PORTABLE CLINICAL INFORMATION: cough, . COMPARISON: Chest x-ray AP portable semiupright September 28, 2017. TECHNIQUE: AP upright view of the chest. FINDINGS: There is left retrocardiac density which is unchanged. Left diaphragm is still seen. The left lateral costophrenic angle remains obscured. The opacity in the right lower lung is slightly decreased. There is a scalloped appearance to the medial aspect of the density suggesting perhaps loculation of pleural fluid. Overall the density in the medial right lower lung has cleared to moderate extent. The cardiomediastinal structures are not changed in the interim. There is no interval pneumothorax. The bones are stable. 1.  The left lower lobe atelectasis/infiltrate, and likely left pleural effusion are unchanged pleural effusion. 2.  Findings suggest decrease in atelectasis infiltrate in the right lower lung and possibly redistribution of pleural fluid on the right. **This report has been created using voice recognition software. It may contain minor errors which are inherent in voice recognition technology. ** Final report electronically signed by Dr. Koby Palmer on 9/29/2017 4:32 PM    Xr Chest Portable    Result Date: 9/28/2017  PROCEDURE: XR CHEST PORTABLE CLINICAL INFORMATION: Cough; questionable aspiration. COMPARISON: 9/21/2017. TECHNIQUE: AP portable chest radiograph performed. FINDINGS: POSTSURGICAL CHANGES: None. LINES/TUBES/MECHANICAL DEVICES: None. TRACHEA/HEART/MEDIASTINUM/HILUM: 1. New obscuration of a portion of the right cardiomediastinal silhouette. 2. Stable mitral annular calcification. LUNG FIELDS: 1. There is new elevation of the right hemidiaphragm to the level of the right hilum. Underlying pleural fluid and atelectasis/infiltrates cannot be excluded. The right upper lung zones are clear.  2. There is new opacification obscuring the retrocardiac left lung base with the differential including pleural fluid and atelectasis/infiltrates. The left lung fields are otherwise clear. OTHER: None. PNEUMOTHORAX:  None. OSSEOUS STRUCTURES: 1. No acute osseous abnormality. 2. Generalized osteopenia. 1. There is new elevation of the right hemidiaphragm to the level of the right hilum. Underlying pleural fluid and atelectasis/infiltrates cannot be excluded. The right upper lung zones are clear. 2. There is new opacification obscuring the retrocardiac left lung base with the differential including pleural fluid and atelectasis/infiltrates. The left lung fields are otherwise clear. **This report has been created using voice recognition software. It may contain minor errors which are inherent in voice recognition technology. ** Final report electronically signed by Dr. Ej Ramey on 9/28/2017 8:16 AM    Xr Chest Portable    Result Date: 9/21/2017  PROCEDURE: XR CHEST PORTABLE CLINICAL INFORMATION: possible pneumonia, COMPARISON: 9/19/2017 TECHNIQUE: A single mobile view of the chest was obtained. 1. Mild cardiomegaly Moderate calcification of the mitral annulus. 2. Mild bibasilar atelectasis/pneumonia. Overall appearance of chest slightly worse than prior. **This report has been created using voice recognition software. It may contain minor errors which are inherent in voice recognition technology. ** Final report electronically signed by Dr. Jayla Cerda on 9/21/2017 2:25 PM    Xr Chest Portable    Result Date: 9/15/2017  PROCEDURE: XR CHEST PORTABLE CLINICAL INFORMATION: NG tube placement,  . COMPARISON: 9/15/2017 at 1740 TECHNIQUE: Portable supine FINDINGS: NG tube is been advanced and is well into the stomach. Distal tip is not included on the image. There are no other changes. NG tube is well into the stomach **This report has been created using voice recognition software. It may contain minor errors which are inherent in voice recognition technology. ** Final report electronically signed by CHANGES: 1. Dental hardware along the mandible. LINES/TUBES/MECHANICAL DEVICES: None. TRACHEA/HEART/MEDIASTINUM/HILUM: 1. The cardiac silhouette is upper limits normal size and partially obscured. 2. There is stable mild atheromatous calcification at the aortic arch. 3. There is stable mitral annular calcification. LUNG FIELDS: 1. The patient is status post ultrasound-guided left thoracentesis with complete aspiration of the left pleural effusion on sonographic imaging. There is no pneumothorax. On the chest radiograph there is opacity obscuring the left lower chest suggesting atelectasis and/or infiltrates. There is also mild patchy atelectasis and/or infiltrate within the right infrahilar region. There is no pulmonary vascular congestion. OTHER: None. PNEUMOTHORAX: None. OSSEOUS STRUCTURES: 1. Generalized osteopenia. 2. Stable mild dextroscoliosis of the thoracic spine. 1. The patient is status post ultrasound-guided left thoracentesis with complete aspiration of the left pleural effusion on sonographic imaging. There is no pneumothorax. On the chest radiograph there is opacity obscuring the left lower chest suggesting atelectasis and/or infiltrates. There is also mild patchy atelectasis and/or infiltrate within the right infrahilar region. There is no pulmonary vascular congestion. **This report has been created using voice recognition software. It may contain minor errors which are inherent in voice recognition technology. ** Final report electronically signed by Dr. Clement Chapa on 10/4/2017 11:17 AM    Vl Carotid Bilateral    Result Date: 9/29/2017  PROCEDURE: Bilateral carotid ultrasound CLINICAL INFORMATION: Stroke COMPARISON: No prior study. TECHNIQUE: Grayscale, color flow and spectral waveform ultrasound images were obtained through the bilateral extracranial carotid and vertebral arteries. Peak systolic and end-diastolic velocities were measured.  FINDINGS: The right common carotid artery appears concerning characteristics, though there is likely mucosal retention cyst of the left maxillary sinus and minimal mucosal thickening of the left more than right ethmoid air cells and sphenoid sinuses. There is nonspecific fluid signal in the left mastoid air cells, and middle ear cavities been clear bilaterally. Vascular structures grossly show nothing concerning. SUBACUTE ISCHEMIC INFARCTIONS INVOLVING BOTH ANTERIOR AND POSTERIOR CIRCULATION DISTRIBUTIONS. THESE ARE SMALL FOCI, CLUSTERED IN THE LEFT FRONTAL LOBE, WITH LESS IMPRESSIVE INVOLVEMENT OF THE RIGHT FRONTAL LOBE AND BILATERAL OCCIPITAL LOBES AND THE LEFT PARIETAL LOBE. SUBACUTE ISCHEMIC INFARCTIONS OF LIKELY DIFFERENT AGE IN THE LEFT CEREBELLUM. NONE HAVE ASSOCIATED BLOOD PRODUCTS. REMOTE MICROHEMORRHAGE IN THE RIGHT FRONTAL LOBE. MODERATE SMALL VESSEL DISEASE SEQUELAE. MILD PARANASAL SINUS DISEASE. FINDINGS DISCUSSED WITH THE [facility] NURSING STAFF AT THE TIME OF THE INTERPRETATION, 1556 HOURS ON 28 SEPTEMBER 2017. **This report has been created using voice recognition software. It may contain minor errors which are inherent in voice recognition technology. ** Final report electronically signed by Dr. Stephanie Dobbs on 9/28/2017 4:01 PM    Fluoroscopy Modified Barium Swallow With Video    Result Date: 9/29/2017  PROCEDURE: FL MODIFIED BARIUM SWALLOW W VIDEO CLINICAL INFORMATION: Dysphasia TECHNIQUE: Fluoroscopy was provided for modified barium swallowing study performed by speech therapy. With the patient in the lateral projection, swallowing mechanism was evaluated using barium of various consistencies. The radiologist was present for the entire examination. One spot image was obtained. Total fluoroscopy time 1.2 minutes. Speech therapist: Ej Foote COMPARISON: None. FINDINGS: Oral, pharyngeal and esophageal structures appear normal. There is laryngeal penetration of thin barium. No aspiration is identified.      1. Laryngeal penetration of thin barium nRBC 0 /100 wbc    Platelet Estimate DECREASED     Microcytes 1+     Anisocytosis 2+     Poikilocytes 1+     Hypochromia 1+     BASOPHILIA SLIGHT     Segs Absolute 4.1 1.8 - 7.7 thou/mm3    Lymphocytes # 0.3 (L) 1.0 - 4.8 thou/mm3    Monocytes # 0.5 0.4 - 1.3 thou/mm3    Eosinophils # 0.0 0.0 - 0.4 thou/mm3    Basophils # 0.0 0.0 - 0.1 thou/mm3   Protime-INR   Result Value Ref Range    INR 1.09 0.85 - 1.13   Phosphorus   Result Value Ref Range    Phosphorus 2.4 2.4 - 4.7 mg/dL   Anion Gap   Result Value Ref Range    Anion Gap 12.0 8.0 - 16.0 meq/L   Glomerular Filtration Rate, Estimated   Result Value Ref Range    Est, Glom Filt Rate 48 (A) ml/min/1.73m2   Scan of Blood Smear   Result Value Ref Range    SCAN OF BLOOD SMEAR see below    Microscopic Urinalysis   Result Value Ref Range    Glucose, Urine NEGATIVE NEGATIVE mg/dl    Bilirubin Urine NEGATIVE NEGATIVE    Ketones, Urine NEGATIVE NEGATIVE    Specific Gravity, UA 1.016 1.002 - 1.03    Blood, Urine TRACE (A) NEGATIVE    pH, UA 6.5 5.0 - 9.0    Protein,  (A) NEGATIVE mg/dl    Urobilinogen, Urine 1.0 0.0 - 1.0 eu/dl    Nitrite, Urine NEGATIVE NEGATIVE    Leukocytes, UA NEGATIVE NEGATIVE    Color, UA YELLOW YELLOW-STR    Character, Urine CLEAR [id number].[id number]    RBC, UA 5-10 0-2/hpf /hpf    WBC, UA 2-4 0-4/hpf /hpf    Epi Cells 0-2 3-5/hpf /hpf    Bacteria, UA NONE FEW/NONE S    Casts NONE SEEN NONE SEEN /lpf    Crystals NONE SEEN NONE SEEN    Renal Epithelial, Urine NONE SEEN NONE SEEN    Yeast, UA NONE SEEN NONE SEEN    Casts NONE SEEN /lpf    Miscellaneous Lab Test Result NONE SEEN    Ammonia   Result Value Ref Range    Ammonia 143 (H) 11 - 60 umol/L   Protime-INR   Result Value Ref Range    INR 1.09 0.85 - 1.13   Urine Drug Screen   Result Value Ref Range    AMPHETAMINE+METHAMPHETAMINE URINE SCREEN Negative NEGATIVE    Barbiturate Quant, Ur Negative NEGATIVE    Benzodiazepine Quant, Ur Negative NEGATIVE    Cannabinoid Quant, Ur Negative NEGATIVE    Cocaine Ferritin 38 10 - 291 ng/mL   Potassium   Result Value Ref Range    Potassium 3.9 3.5 - 5.2 meq/L   Anion Gap   Result Value Ref Range    Anion Gap 12.0 8.0 - 16.0 meq/L   Glomerular Filtration Rate, Estimated   Result Value Ref Range    Est, Glom Filt Rate 40 (A) ml/min/1.73m2   Ammonia   Result Value Ref Range    Ammonia 66 (H) 11 - 60 umol/L   Comprehensive Metabolic Panel   Result Value Ref Range    Glucose 118 (H) 70 - 108 mg/dL    CREATININE 1.6 (H) 0.4 - 1.2 mg/dL    BUN 49 (H) 7 - 22 mg/dL    Sodium 144 135 - 145 meq/L    Potassium 3.9 3.5 - 5.2 meq/L    Chloride 108 98 - 111 meq/L    CO2 25 23 - 33 meq/L    Calcium 11.5 (H) 8.5 - 10.5 mg/dL    AST 18 5 - 40 U/L    Alkaline Phosphatase 181 (H) 38 - 126 U/L    Total Protein 5.0 (L) 6.1 - 8.0 g/dL    Alb 2.1 (L) 3.5 - 5.1 g/dL    Total Bilirubin 1.6 (H) 0.3 - 1.2 mg/dL    ALT 13 11 - 66 U/L   CBC Auto Differential   Result Value Ref Range    WBC 5.1 4.8 - 10.8 thou/mm3    RBC 3.57 (L) 4.20 - 5.40 mill/mm3    Hemoglobin 9.2 (L) 12.0 - 16.0 gm/dl    Hematocrit 28.0 (L) 37.0 - 47.0 %    MCV 78.6 (L) 81.0 - 99.0 fL    MCH 25.8 (L) 27.0 - 31.0 pg    MCHC 32.8 (L) 33.0 - 37.0 gm/dl    RDW 22.4 (H) 11.5 - 14.5 %    Platelets 56 (L) 567 - 400 thou/mm3    MPV 8.9 7.4 - 10.4 mcm    RBC Morphology NORMAL     Seg Neutrophils 84.5 %    Lymphocytes 5.3 %    Monocytes 9.9 %    Eosinophils 0.1 %    Basophils 0.2 %    nRBC 0 /100 wbc    Microcytes 1+     Anisocytosis 2+     Hypochromia 1+     Segs Absolute 4.3 1.8 - 7.7 thou/mm3    Lymphocytes # 0.3 (L) 1.0 - 4.8 thou/mm3    Monocytes # 0.5 0.4 - 1.3 thou/mm3    Eosinophils # 0.0 0.0 - 0.4 thou/mm3    Basophils # 0.0 0.0 - 0.1 thou/mm3   Magnesium   Result Value Ref Range    Magnesium 1.9 1.6 - 2.4 mg/dL   Phosphorus   Result Value Ref Range    Phosphorus 3.0 2.4 - 4.7 mg/dL   AFP Tumor Marker   Result Value Ref Range    AFP-Tumor Marker 2.2 <8.4 ug/L   Anion Gap   Result Value Ref Range    Anion Gap 11.0 8.0 - 16.0 meq/L Glomerular Filtration Rate, Estimated   Result Value Ref Range    Est Glom Filt Rate 31 (A) XY/RBF/2.63S9   Basic Metabolic Panel   Result Value Ref Range    Sodium 144 135 - 145 meq/L    Potassium 5.0 3.5 - 5.2 meq/L    Chloride 107 98 - 111 meq/L    CO2 22 (L) 23 - 33 meq/L    Glucose 104 70 - 108 mg/dL    BUN 50 (H) 7 - 22 mg/dL    CREATININE 1.5 (H) 0.4 - 1.2 mg/dL    Calcium 11.9 (H) 8.5 - 10.5 mg/dL   PTH, Intact   Result Value Ref Range    Pth Intact 120.3 (H) 15.0 - 65.0 pg/mL   Vitamin D 25 hydroxy   Result Value Ref Range    Vit D, 25-Hydroxy 10 (L) 30 - 100 ng/ml   Ammonia   Result Value Ref Range    Ammonia 38 11 - 60 umol/L   Comprehensive Metabolic Panel   Result Value Ref Range    Glucose 102 70 - 108 mg/dL    CREATININE 1.7 (H) 0.4 - 1.2 mg/dL    BUN 53 (H) 7 - 22 mg/dL    Sodium 140 135 - 145 meq/L    Potassium 4.3 3.5 - 5.2 meq/L    Chloride 107 98 - 111 meq/L    CO2 21 (L) 23 - 33 meq/L    Calcium 11.5 (H) 8.5 - 10.5 mg/dL    AST 24 5 - 40 U/L    Alkaline Phosphatase 192 (H) 38 - 126 U/L    Total Protein 5.5 (L) 6.1 - 8.0 g/dL    Alb 2.4 (L) 3.5 - 5.1 g/dL    Total Bilirubin 1.8 (H) 0.3 - 1.2 mg/dL    ALT 15 11 - 66 U/L   CBC Auto Differential   Result Value Ref Range    WBC 6.5 4.8 - 10.8 thou/mm3    RBC 3.93 (L) 4.20 - 5.40 mill/mm3    Hemoglobin 9.9 (L) 12.0 - 16.0 gm/dl    Hematocrit 30.9 (L) 37.0 - 47.0 %    MCV 78.5 (L) 81.0 - 99.0 fL    MCH 25.2 (L) 27.0 - 31.0 pg    MCHC 32.1 (L) 33.0 - 37.0 gm/dl    RDW 22.1 (H) 11.5 - 14.5 %    Platelets 55 (L) 769 - 400 thou/mm3    MPV 9.0 7.4 - 10.4 mcm    RBC Morphology NORMAL     Seg Neutrophils 85.0 %    Lymphocytes 4.0 %    Monocytes 10.2 %    Eosinophils 0.4 %    Basophils 0.4 %    nRBC 0 /100 wbc    Microcytes 1+     Anisocytosis 2+     Hypochromia 1+     Segs Absolute 5.5 1.8 - 7.7 thou/mm3    Lymphocytes # 0.3 (L) 1.0 - 4.8 thou/mm3    Monocytes # 0.7 0.4 - 1.3 thou/mm3    Eosinophils # 0.0 0.0 - 0.4 thou/mm3    Basophils # 0.0 0.0 - 0.1 11 - 66 U/L   CBC Auto Differential   Result Value Ref Range    WBC 8.0 4.8 - 10.8 thou/mm3    RBC 3.91 (L) 4.20 - 5.40 mill/mm3    Hemoglobin 9.8 (L) 12.0 - 16.0 gm/dl    Hematocrit 30.9 (L) 37.0 - 47.0 %    MCV 79.0 (L) 81.0 - 99.0 fL    MCH 25.2 (L) 27.0 - 31.0 pg    MCHC 31.9 (L) 33.0 - 37.0 gm/dl    RDW 20.8 (H) 11.5 - 14.5 %    Platelets 58 (L) 102 - 400 thou/mm3    MPV 9.4 7.4 - 10.4 mcm    RBC Morphology NORMAL     Seg Neutrophils 82.8 %    Lymphocytes 4.1 %    Monocytes 13.0 %    Eosinophils 0.1 %    Basophils 0.0 %    nRBC 0 /100 wbc    Microcytes 1+     Anisocytosis 1+     Hypochromia 1+     Segs Absolute 6.6 1.8 - 7.7 thou/mm3    Lymphocytes # 0.3 (L) 1.0 - 4.8 thou/mm3    Monocytes # 1.0 0.4 - 1.3 thou/mm3    Eosinophils # 0.0 0.0 - 0.4 thou/mm3    Basophils # 0.0 0.0 - 0.1 thou/mm3   Magnesium   Result Value Ref Range    Magnesium 1.9 1.6 - 2.4 mg/dL   Phosphorus   Result Value Ref Range    Phosphorus 3.2 2.4 - 4.7 mg/dL   Kappa/Lambda quant free light chains serum   Result Value Ref Range    Kappa/Lambda Free Light Chains SEE BELOW    Calcium, Ionized   Result Value Ref Range    Calcium, Ion 1.51 (H) 1.12 - 1.32 mmol/L   Anion Gap   Result Value Ref Range    Anion Gap 20.0 (H) 8.0 - 16.0 meq/L   Glomerular Filtration Rate, Estimated   Result Value Ref Range    Est, Glom Filt Rate 27 (A) ml/min/1.73m2   T4, Free   Result Value Ref Range    T4 Free 1.12 0.93 - 1.76 ng/dL   TSH without Reflex   Result Value Ref Range    TSH 2.410 0.400 - 4.20 uIU/mL   Ammonia   Result Value Ref Range    Ammonia 14 11 - 60 umol/L   Comprehensive Metabolic Panel   Result Value Ref Range    Sodium 141 135 - 145 meq/L    Potassium 4.1 3.5 - 5.2 meq/L    Chloride 106 98 - 111 meq/L    CO2 19 (L) 23 - 33 meq/L    Glucose 121 (H) 70 - 108 mg/dL    BUN 53 (H) 7 - 22 mg/dL    CREATININE 2.0 (H) 0.4 - 1.2 mg/dL    Calcium 10.5 8.5 - 10.5 mg/dL    AST 36 5 - 40 U/L    Alkaline Phosphatase 180 (H) 38 - 126 U/L    Total Protein 5.7 UA 15-20 0-2/hpf /hpf    WBC, UA 10-15 0-4/hpf /hpf    Epi Cells 3-5 3-5/hpf /hpf    Bacteria, UA NONE FEW/NONE S /hpf    Casts UA 0-4 C. GRAN NONE SEEN /lpf    Crystals NONE SEEN NONE SEEN    Renal Epithelial, Urine PRESENT NONE SEEN    Yeast, UA NONE SEEN NONE SEEN    CASTS 2 NONE SEEN NONE SEEN /lpf    MISCELLANEOUS 2 NONE SEEN    Eosinophil smear urine   Result Value Ref Range    Eosinophil Smear None Seen NONE SEEN    Specimen Urine    Cortisol   Result Value Ref Range    Cortisol Collection Info AM Specimen    Comprehensive Metabolic Panel   Result Value Ref Range    Glucose 120 (H) 70 - 108 mg/dL    CREATININE 2.5 (H) 0.4 - 1.2 mg/dL    BUN 66 (H) 7 - 22 mg/dL    Sodium 142 135 - 145 meq/L    Potassium 4.4 3.5 - 5.2 meq/L    Chloride 107 98 - 111 meq/L    CO2 16 (L) 23 - 33 meq/L    Calcium 10.2 8.5 - 10.5 mg/dL    AST 27 5 - 40 U/L    Alkaline Phosphatase 171 (H) 38 - 126 U/L    Total Protein 5.4 (L) 6.1 - 8.0 g/dL    Alb 2.2 (L) 3.5 - 5.1 g/dL    Total Bilirubin 1.8 (H) 0.3 - 1.2 mg/dL    ALT 18 11 - 66 U/L   CBC Auto Differential   Result Value Ref Range    WBC 10.4 4.8 - 10.8 thou/mm3    RBC 4.17 (L) 4.20 - 5.40 mill/mm3    Hemoglobin 10.7 (L) 12.0 - 16.0 gm/dl    Hematocrit 32.6 (L) 37.0 - 47.0 %    MCV 78.2 (L) 81.0 - 99.0 fL    MCH 25.6 (L) 27.0 - 31.0 pg    MCHC 32.7 (L) 33.0 - 37.0 gm/dl    RDW 21.7 (H) 11.5 - 14.5 %    Platelets 98 (L) 687 - 400 thou/mm3    MPV 9.6 7.4 - 10.4 mcm    RBC Morphology NORMAL     Seg Neutrophils 88.3 %    Lymphocytes 2.2 %    Monocytes 9.2 %    Eosinophils 0.0 %    Basophils 0.3 %    nRBC 0 /100 wbc    Microcytes 1+     Anisocytosis 2+     Hypochromia 1+     Segs Absolute 9.2 (H) 1.8 - 7.7 thou/mm3    Lymphocytes # 0.2 (L) 1.0 - 4.8 thou/mm3    Monocytes # 1.0 0.4 - 1.3 thou/mm3    Eosinophils # 0.0 0.0 - 0.4 thou/mm3    Basophils # 0.0 0.0 - 0.1 thou/mm3   Magnesium   Result Value Ref Range    Magnesium 2.1 1.6 - 2.4 mg/dL   Phosphorus   Result Value Ref Range Phosphorus 4.9 (H) 2.4 - 4.7 mg/dL   Cortisol Total   Result Value Ref Range    Cortisol 67.46 ug/dL    Cortisol Collection Info AM Specimen    Anion Gap   Result Value Ref Range    Anion Gap 19.0 (H) 8.0 - 16.0 meq/L   Glomerular Filtration Rate, Estimated   Result Value Ref Range    Est, Glom Filt Rate 19 (A) ml/min/1.73m2   Blood Gas, Arterial   Result Value Ref Range    pH, Blood Gas 7.41 7.35 - 7.45    PCO2 30 (L) 35 - 45 mmhg    PO2 88 71 - 104 mmhg    HCO3 19 (L) 23 - 28 mmol/l    Base Excess (Calculated) -4.8 (L) -2.5 - 2.5 mmol/l    O2 Sat 97 %    DEVICE Room Air     Vamsi Test Positive     Source: L Radial     COLLECTED BY: [id number]    Comprehensive Metabolic Panel   Result Value Ref Range    Glucose 127 (H) 70 - 108 mg/dL    CREATININE 2.5 (H) 0.4 - 1.2 mg/dL    BUN 67 (H) 7 - 22 mg/dL    Sodium 142 135 - 145 meq/L    Potassium 4.6 3.5 - 5.2 meq/L    Chloride 111 98 - 111 meq/L    CO2 13 (L) 23 - 33 meq/L    Calcium 10.4 8.5 - 10.5 mg/dL    AST 27 5 - 40 U/L    Alkaline Phosphatase 168 (H) 38 - 126 U/L    Total Protein 5.3 (L) 6.1 - 8.0 g/dL    Alb 2.1 (L) 3.5 - 5.1 g/dL    Total Bilirubin 1.7 (H) 0.3 - 1.2 mg/dL    ALT 17 11 - 66 U/L   CBC Auto Differential   Result Value Ref Range    WBC 10.2 4.8 - 10.8 thou/mm3    RBC 3.96 (L) 4.20 - 5.40 mill/mm3    Hemoglobin 9.9 (L) 12.0 - 16.0 gm/dl    Hematocrit 29.9 (L) 37.0 - 47.0 %    MCV 75.6 (L) 81.0 - 99.0 fL    MCH 25.0 (L) 27.0 - 31.0 pg    MCHC 33.1 33.0 - 37.0 gm/dl    RDW 22.8 (H) 11.5 - 14.5 %    Platelets 64 (L) 625 - 400 thou/mm3    MPV 8.8 7.4 - 10.4 mcm    RBC Morphology NORMAL     Seg Neutrophils 89.1 %    Lymphocytes 5.6 %    Monocytes 5.2 %    Eosinophils 0.1 %    Basophils 0.0 %    nRBC 0 /100 wbc    Microcytes 1+     Anisocytosis 2+     Hypochromia 1+     Segs Absolute 9.1 (H) 1.8 - 7.7 thou/mm3    Lymphocytes # 0.6 (L) 1.0 - 4.8 thou/mm3    Monocytes # 0.5 0.4 - 1.3 thou/mm3    Eosinophils # 0.0 0.0 - 0.4 thou/mm3    Basophils # 0.0 0.0 - 0.1 thou/mm3   Magnesium   Result Value Ref Range    Magnesium 2.3 1.6 - 2.4 mg/dL   Phosphorus   Result Value Ref Range    Phosphorus 5.1 (H) 2.4 - 4.7 mg/dL   Anion Gap   Result Value Ref Range    Anion Gap 18.0 (H) 8.0 - 16.0 meq/L   Glomerular Filtration Rate, Estimated   Result Value Ref Range    Est, Glom Filt Rate 19 (A) ml/min/1.73m2   Comprehensive Metabolic Panel   Result Value Ref Range    Sodium 146 (H) 135 - 145 meq/L    Potassium 3.7 3.5 - 5.2 meq/L    Chloride 110 98 - 111 meq/L    CO2 16 (L) 23 - 33 meq/L    Glucose 94 70 - 108 mg/dL    BUN 77 (H) 7 - 22 mg/dL    CREATININE 3.0 (HH) 0.4 - 1.2 mg/dL    Calcium 10.2 8.5 - 10.5 mg/dL    AST 21 5 - 40 U/L    Alkaline Phosphatase 173 (H) 38 - 126 U/L    Total Protein 5.0 (L) 6.1 - 8.0 g/dL    Alb 2.0 (L) 3.5 - 5.1 g/dL    Total Bilirubin 1.5 (H) 0.3 - 1.2 mg/dL    ALT 15 11 - 66 U/L   CBC Auto Differential   Result Value Ref Range    WBC 10.5 4.8 - 10.8 thou/mm3    RBC 3.92 (L) 4.20 - 5.40 mill/mm3    Hemoglobin 9.8 (L) 12.0 - 16.0 gm/dl    Hematocrit 30.0 (L) 37.0 - 47.0 %    MCV 76.7 (L) 81.0 - 99.0 fL    MCH 25.0 (L) 27.0 - 31.0 pg    MCHC 32.5 (L) 33.0 - 37.0 gm/dl    RDW 22.8 (H) 11.5 - 14.5 %    Platelets 82 (L) 530 - 400 thou/mm3    MPV 9.0 7.4 - 10.4 mcm    RBC Morphology SL ABNORMAL     Seg Neutrophils 76.2 %    Lymphocytes 13.5 %    Monocytes 9.8 %    Eosinophils 0.5 %    Basophils 0.0 %    nRBC 2 /100 wbc    Platelet Estimate DECREASED     Microcytes 1+     Anisocytosis 2+     Poikilocytes 1+     Hypochromia 1+     CRENATED RBC'S FEW     Segs Absolute 8.0 (H) 1.8 - 7.7 thou/mm3    Lymphocytes # 1.4 1.0 - 4.8 thou/mm3    Monocytes # 1.0 0.4 - 1.3 thou/mm3    Eosinophils # 0.1 0.0 - 0.4 thou/mm3    Basophils # 0.0 0.0 - 0.1 thou/mm3   Magnesium   Result Value Ref Range    Magnesium 2.0 1.6 - 2.4 mg/dL   Phosphorus   Result Value Ref Range    Phosphorus 5.0 (H) 2.4 - 4.7 mg/dL   Anion Gap   Result Value Ref Range    Anion Gap 20.0 (H) 8.0 - 16.0 meq/L Glomerular Filtration Rate, Estimated   Result Value Ref Range    Est, Glom Filt Rate 15 (A) ml/min/1.73m2   Scan of Blood Smear   Result Value Ref Range    SCAN OF BLOOD SMEAR see below    Ammonia   Result Value Ref Range    Ammonia 20 11 - 60 umol/L   Procalcitonin   Result Value Ref Range    Procalcitonin 0.58 (H) 0.01 - 0.09 ng/mL   Basic Metabolic Panel   Result Value Ref Range    Sodium 144 135 - 145 meq/L    Potassium 3.1 (L) 3.5 - 5.2 meq/L    Chloride 110 98 - 111 meq/L    CO2 19 (L) 23 - 33 meq/L    Glucose 175 (H) 70 - 108 mg/dL    BUN 79 (H) 7 - 22 mg/dL    CREATININE 2.9 (H) 0.4 - 1.2 mg/dL    Calcium 9.5 8.5 - 10.5 mg/dL   Vitamin B12 & folate   Result Value Ref Range    Vitamin B-12 1607 (H) 211 - 911 pg/mL    Folate 11.0 4.8 - 24.2 ng/mL   Anion Gap   Result Value Ref Range    Anion Gap 15.0 8.0 - 16.0 meq/L   Glomerular Filtration Rate, Estimated   Result Value Ref Range    Est, Glom Filt Rate 16 (A) FT/TIH/1.72Z0   Basic Metabolic Panel   Result Value Ref Range    Sodium 149 (H) 135 - 145 meq/L    Potassium 3.3 (L) 3.5 - 5.2 meq/L    Chloride 111 98 - 111 meq/L    CO2 20 (L) 23 - 33 meq/L    Glucose 151 (H) 70 - 108 mg/dL    BUN 75 (H) 7 - 22 mg/dL    CREATININE 2.7 (H) 0.4 - 1.2 mg/dL    Calcium 9.4 8.5 - 10.5 mg/dL   Magnesium   Result Value Ref Range    Magnesium 1.9 1.6 - 2.4 mg/dL   Phosphorus   Result Value Ref Range    Phosphorus 4.2 2.4 - 4.7 mg/dL   Vitamin B12 & folate   Result Value Ref Range    Vitamin B-12 1701 (H) 211 - 911 pg/mL    Folate 10.9 4.8 - 24.2 ng/mL   Anion Gap   Result Value Ref Range    Anion Gap 18.0 (H) 8.0 - 16.0 meq/L   Glomerular Filtration Rate, Estimated   Result Value Ref Range    Est, Glom Filt Rate 17 (A) JM/XCX/0.12W1   Basic Metabolic Panel   Result Value Ref Range    Sodium 145 135 - 145 meq/L    Potassium 3.1 (L) 3.5 - 5.2 meq/L    Chloride 108 98 - 111 meq/L    CO2 21 (L) 23 - 33 meq/L    Glucose 114 (H) 70 - 108 mg/dL    BUN 71 (H) 7 - 22 mg/dL Range    Est, Glom Filt Rate 26 (A) ml/min/1.73m2   Manual Differential   Result Value Ref Range    Differential, manual see below    Magnesium   Result Value Ref Range    Magnesium 1.7 1.6 - 2.4 mg/dL   Basic Metabolic Panel   Result Value Ref Range    Sodium 145 135 - 145 meq/L    Potassium 3.3 (L) 3.5 - 5.2 meq/L    Chloride 106 98 - 111 meq/L    CO2 24 23 - 33 meq/L    Glucose 91 70 - 108 mg/dL    BUN 66 (H) 7 - 22 mg/dL    CREATININE 1.8 (H) 0.4 - 1.2 mg/dL    Calcium 9.6 8.5 - 10.5 mg/dL   Anion Gap   Result Value Ref Range    Anion Gap 15.0 8.0 - 16.0 meq/L   Glomerular Filtration Rate, Estimated   Result Value Ref Range    Est, Glom Filt Rate 27 (A) ml/min/1.73m2   Phenytoin level, total and free   Result Value Ref Range    Phenytoin, Free SEE BELOW    Basic Metabolic Panel   Result Value Ref Range    Sodium 146 (H) 135 - 145 meq/L    Potassium 3.9 3.5 - 5.2 meq/L    Chloride 108 98 - 111 meq/L    CO2 22 (L) 23 - 33 meq/L    Glucose 137 (H) 70 - 108 mg/dL    BUN 61 (H) 7 - 22 mg/dL    CREATININE 1.7 (H) 0.4 - 1.2 mg/dL    Calcium 9.9 8.5 - 10.5 mg/dL   CBC Auto Differential   Result Value Ref Range    WBC 14.6 (H) 4.8 - 10.8 thou/mm3    RBC 4.43 4.20 - 5.40 mill/mm3    Hemoglobin 11.3 (L) 12.0 - 16.0 gm/dl    Hematocrit 35.1 (L) 37.0 - 47.0 %    MCV 79.3 (L) 81.0 - 99.0 fL    MCH 25.6 (L) 27.0 - 31.0 pg    MCHC 32.3 (L) 33.0 - 37.0 gm/dl    RDW 22.8 (H) 11.5 - 14.5 %    Platelets 67 (L) 523 - 400 thou/mm3    MPV 9.8 7.4 - 10.4 mcm    RBC Morphology ABNORMAL     Seg Neutrophils 83.5 %    Lymphocytes 3.5 %    Monocytes 12.7 %    Eosinophils 0.0 %    Basophils 0.3 %    nRBC 0 /100 wbc    Platelet Estimate DECREASED     Microcytes 1+     Anisocytosis 2+     Poikilocytes 2+     Hypochromia 1+     Ovalocytes FEW     CRENATED RBC'S MODERATE     Schistocytes FEW     Segs Absolute 12.2 (H) 1.8 - 7.7 thou/mm3    Lymphocytes # 0.5 (L) 1.0 - 4.8 thou/mm3    Monocytes # 1.9 (H) 0.4 - 1.3 thou/mm3    Eosinophils # 0.0 0.0 - 0.4 thou/mm3    Basophils # 0.0 0.0 - 0.1 thou/mm3   Magnesium   Result Value Ref Range    Magnesium 1.9 1.6 - 2.4 mg/dL   Calcium, Ionized   Result Value Ref Range    Calcium, Ion 1.34 (H) 1.12 - 1.32 mmol/L   Ammonia   Result Value Ref Range    Ammonia 20 11 - 60 umol/L   Potassium   Result Value Ref Range    Potassium 4.0 3.5 - 5.2 meq/L   Lipid Panel   Result Value Ref Range    Cholesterol, Total 163 100 - 199 mg/dL    Triglycerides 67 0 - 199 mg/dL    HDL 23 mg/dL    LDL Calculated 127 mg/dL   Anion Gap   Result Value Ref Range    Anion Gap 16.0 8.0 - 16.0 meq/L   Glomerular Filtration Rate, Estimated   Result Value Ref Range    Est, Glom Filt Rate 29 (A) ml/min/1.73m2   Scan of Blood Smear   Result Value Ref Range    SCAN OF BLOOD SMEAR see below    Basic Metabolic Panel   Result Value Ref Range    Sodium 145 135 - 145 meq/L    Potassium 3.6 3.5 - 5.2 meq/L    Chloride 109 98 - 111 meq/L    CO2 23 23 - 33 meq/L    Glucose 117 (H) 70 - 108 mg/dL    BUN 61 (H) 7 - 22 mg/dL    CREATININE 1.4 (H) 0.4 - 1.2 mg/dL    Calcium 10.8 (H) 8.5 - 10.5 mg/dL   Anion Gap   Result Value Ref Range    Anion Gap 13.0 8.0 - 16.0 meq/L   Glomerular Filtration Rate, Estimated   Result Value Ref Range    Est, Glom Filt Rate 36 (A) ml/min/1.73m2   CBC auto differential   Result Value Ref Range    WBC 13.5 (H) 4.8 - 10.8 thou/mm3    RBC 4.01 (L) 4.20 - 5.40 mill/mm3    Hemoglobin 10.1 (L) 12.0 - 16.0 gm/dl    Hematocrit 31.0 (L) 37.0 - 47.0 %    MCV 77.2 (L) 81.0 - 99.0 fL    MCH 25.3 (L) 27.0 - 31.0 pg    MCHC 32.7 (L) 33.0 - 37.0 gm/dl    RDW 22.5 (H) 11.5 - 14.5 %    Platelets 50 (L) 623 - 400 thou/mm3    MPV 9.8 7.4 - 10.4 mcm    RBC Morphology SL ABNORMAL     Seg Neutrophils 92.1 %    Lymphocytes 2.0 %    Monocytes 5.1 %    Eosinophils 0.3 %    Basophils 0.5 %    nRBC 0 /100 wbc    Platelet Estimate DECREASED     Microcytes 1+     Anisocytosis 2+     Poikilocytes SLIGHT     Hypochromia 1+     BASOPHILIA SLIGHT     Rouleaux Value Ref Range    WBC 7.2 4.8 - 10.8 thou/mm3    RBC 4.86 4.20 - 5.40 mill/mm3    Hemoglobin 12.7 12.0 - 16.0 gm/dl    Hematocrit 39.0 37.0 - 47.0 %    MCV 80.3 (L) 81.0 - 99.0 fL    MCH 26.0 (L) 27.0 - 31.0 pg    MCHC 32.5 (L) 33.0 - 37.0 gm/dl    RDW 23.0 (H) 11.5 - 14.5 %    Platelets 35 (L) 088 - 400 thou/mm3    MPV 9.1 7.4 - 10.4 mcm    RBC Morphology NORMAL     Seg Neutrophils 86.5 %    Lymphocytes 2.7 %    Monocytes 10.5 %    Eosinophils 0.1 %    Basophils 0.2 %    nRBC 1 /100 wbc    Platelet Estimate DECREASED     Microcytes 1+     Anisocytosis 2+     Poikilocytes 2+     Hypochromia 1+     Rouleaux SLIGHT     Ovalocytes FEW     CRENATED RBC'S MODERATE     Schistocytes OCC.      Segs Absolute 6.2 1.8 - 7.7 thou/mm3    Lymphocytes # 0.2 (L) 1.0 - 4.8 thou/mm3    Monocytes # 0.8 0.4 - 1.3 thou/mm3    Eosinophils # 0.0 0.0 - 0.4 thou/mm3    Basophils # 0.0 0.0 - 0.1 thou/mm3   Basic Metabolic Panel   Result Value Ref Range    Sodium 148 (H) 135 - 145 meq/L    Potassium 3.1 (L) 3.5 - 5.2 meq/L    Chloride 109 98 - 111 meq/L    CO2 24 23 - 33 meq/L    Glucose 105 70 - 108 mg/dL    BUN 63 (H) 7 - 22 mg/dL    CREATININE 1.6 (H) 0.4 - 1.2 mg/dL    Calcium 11.5 (H) 8.5 - 10.5 mg/dL   Magnesium   Result Value Ref Range    Magnesium 1.7 1.6 - 2.4 mg/dL   Anion Gap   Result Value Ref Range    Anion Gap 15.0 8.0 - 16.0 meq/L   Glomerular Filtration Rate, Estimated   Result Value Ref Range    Est, Glom Filt Rate 31 (A) ml/min/1.73m2   Potassium   Result Value Ref Range    Potassium 2.9 (L) 3.5 - 5.2 meq/L   Scan of Blood Smear   Result Value Ref Range    SCAN OF BLOOD SMEAR see below    CBC auto differential   Result Value Ref Range    WBC 8.8 4.8 - 10.8 thou/mm3    RBC 3.93 (L) 4.20 - 5.40 mill/mm3    Hemoglobin 10.5 (L) 12.0 - 16.0 gm/dl    Hematocrit 30.4 (L) 37.0 - 47.0 %    MCV 77.3 (L) 81.0 - 99.0 fL    MCH 26.7 (L) 27.0 - 31.0 pg    MCHC 34.6 33.0 - 37.0 gm/dl    RDW 23.6 (H) 11.5 - 14.5 %    Platelets 44 (L) 712 -

## 2017-10-05 NOTE — CARE COORDINATION
10/2/17, 7:29 AM    DISCHARGE BARRIERS        Patient transferred to . Report given to unit Mila Helms, regarding discharge plan for this patient.
10/5/17, 9:11 AM    DISCHARGE BARRIERS    Spoke to Amelia Junior at [facility] to make her aware family met with hospice and would like to go to [facility] with [facility] hospice. Amelia Junior is agreeable to discharge plan. Amelia Junior states she has private and semi private rooms available. Amelia Junior states a semi private room is 235 per day and a private room is 254 per day. Will make family aware. Update: 9:50 AM- Spoke to Lizeth Heracliojacque, son to make him aware of price of room and board for semi private and private room. Lizeth Junior states he knows this is what the patient needs. Patient states he wants a private room. Made Dimitrios aware patient to be discharged today. Lizeth Junior is agreeable to discharge plan. Will call Lizeth Junior when discharge time is known. Update: 10:30 AM- Left message for Ashley to discuss discharge time and to make her aware family wants a private room. Awaiting return call. Update: 11:30 AM- Spoke to Hope Vernon who states she is able accept patient today in a private room. Made Ashley aware [address] will be following and will see tomorrow. Amelia Junior is agreeable to discharge plan. Will arrange transport when discharge ready. Update: 1:50 PM- Faxed transport forms and AVS.  Will call son when transport time is known. Update: 2:50 PM- Spoke to Lizeth Junior to make him aware of transport time at 4 PM.  Lizeth Junior is agreeable to discharge plan.
9/18/17, 11:36 AM  Savi Duran day: 3  Location: [facility]24/024-A Reason for admit: Hypertensive urgency [I16.0]  Hypertensive urgency [I16.0]   Clinical update: Pt continues to be confused, has huerta, IV fluids, NG tube discontinued, taking PO meds and eating this am,   Discharge plan: Pt from home alone. Discussed plan of care with nurse and . Plan is for PT/OT evals and also a TCU eval (family requests TCU and refuse ECF per SS). We will follow.
9/25/17, 7:13 AM    DISCHARGE BARRIERS        Patient transferred to . Report given to unit Angelic Jimenes, regarding discharge plan for this patient.
9/27/17, 11:52 AM    DISCHARGE BARRIERS    To patient's room-she is sleeping and there is no family present. Call to daughter Lucy-no answer left message for her to contact GABBY.
9/27/17, 2:55 PM      [address]  day: 12  Location: [facility]04/004- Reason for admit: Hypertensive urgency [I16.0]  Hypertensive urgency [I16.0]   Treatment Plan of Care: Palliative Care following. Bicarb gtt and K+ replacement. IV Rocephin. Cognition fairly good today. Took several bites po today. Nephrology following. Core Measures: VTE  PCP: Jair Garces  Discharge Plan:  following. Plan is to go to [address] [place] [address] at discharge.
9/27/17, 7:31 AM    DISCHARGE BARRIERS    Received message from Jones Arce with [facility] (4:12 pm on Tuesday)-she has reviewed patient information and facility will be able to accept patient.
9/29/17, 9:38 AM      Clayton Cook day: 14  Location: [facility]04/004 Reason for admit: Hypertensive urgency [I16.0]  Hypertensive urgency [I16.0]   Procedure: MRI BRain wo cont:   SUBACUTE ISCHEMIC INFARCTIONS INVOLVING BOTH ANTERIOR AND POSTERIOR CIRCULATION DISTRIBUTIONS. THESE ARE SMALL FOCI, CLUSTERED IN THE LEFT FRONTAL LOBE, WITH LESS IMPRESSIVE INVOLVEMENT OF THE RIGHT FRONTAL LOBE AND BILATERAL OCCIPITAL LOBES    AND THE LEFT PARIETAL LOBE. SUBACUTE ISCHEMIC INFARCTIONS OF LIKELY DIFFERENT AGE IN THE LEFT CEREBELLUM. NONE HAVE ASSOCIATED BLOOD PRODUCTS. REMOTE MICROHEMORRHAGE IN THE RIGHT FRONTAL LOBE. MODERATE SMALL VESSEL DISEASE SEQUELAE.       MILD PARANASAL SINUS DISEASE       Treatment Plan of Care: planned modified barium swallow today, HEAVEN to be done Dr Sofia Akers.  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
DISCHARGE BARRIERS  9/18/17, 9:17 AM    Reason for Referral: Please follow for discharge needs  Mental Status: Patient is alert to name, stated she was in court in [place]. Decision Making: Patient was making her own decisions prior to admission. Patient's son and daughter listed as contacts. Family/Social/Home Environment: Assessment completed with Patient's son by phone. SW left message with Patient's daughter, Ludivina Diaz. Patient resides at home alone in a two story home where her bedroom is located upstairs. Patient was using a walker or cane to get around at home. Patient confusion is new but was starting to struggle at home some with cooking and had a couple of falls per Patient's son, Betzy Washington. Betzy Washington stated that she has been to his home to stay for awhile and is able to return to his home if needed at discharge. Betzy Washington stated that he knows his mom will not want to go anywhere else other than home and doesn't feel she would go to an ECF for therapy. Current Services: none  Current Equipment: walker, cane  Payment Source: Medicare  Concerns or Barriers to Discharge: Patient is confused and from home alone  Collabrative List of ECF/HH were provided: not at this time    Teach Back Method used with Patient regarding care plan and discharge plan. Patient and son verbalize understanding of the plan of care and contribute to goal setting. Anticipated Needs/Discharge Plan: Patient may discharge home with son and home health. Patient has fallen at home per son and request PT/OT evaluations to see how Patient is moving.      Electronically signed by OPAL Borrero, HOLLY on 9/18/2017 at 9:17 AM
N/A  Type of Home Care Services:  None  Patient expects to be discharged to:  home  Expected Discharge date:  09/18/17  Follow Up Appointment: Best Day/ Time:      Discharge Plan: Attempted to speak with patient, but she only moans. Called daughter, Jasmina Raygoza and she states that her mom lives alone. Her and her brother help take care of grocery shopping, transportation to doctor's appointments, housekeeping and yard work. Lucy states that her mom still drives occasionally. She states her mom fell recently and she is very worried about her being at home by herself, and she has attempted to talk to her mom about this, but her mom is set in her ways and does not want to change her current situation.     Evaluation: yes

## 2017-10-05 NOTE — PLAN OF CARE
Ongoing  Patient lethargic and unable to participate in assessment. Problem: Musculor/Skeletal Functional Status  Goal: Highest potential functional level  Outcome: Ongoing  Limited movement in all extremities. Comments:   Care plan reviewed with patient.

## 2017-10-05 NOTE — PROGRESS NOTES
Pt resting in bed with eyes closed, easy resp. No attempt at verbals. Does not follow commands or open eyes until repositioned in bed. When eyes are open, does not track.

## 2017-10-05 NOTE — PROGRESS NOTES
Visit made to floor to assess patient and check on plan of discharge. [place]  resting in bed with eyes closed. Respirations easy and unlabored with no s/s of distress or discomfort noted. SW has spoke with Clary Mason of [employer] and plan for patient to go there today as private pay. Script suggestions left on chart and DNR-CC for physician signature prior to discharge. Dr. David Schuler aware and agreeable. Discussed with patient primary nurse and asked for copies of scripts, DNR-CC , and AVS to be faxed to hospice office at [phone] and to please update with time of discharge to 2215.

## 2017-10-09 LAB
ANAEROBIC CULTURE: NORMAL
BODY FLUID CULTURE, STERILE: NORMAL
GRAM STAIN RESULT: NORMAL

## 2017-10-16 ENCOUNTER — TELEPHONE (OUTPATIENT)
Dept: UROLOGY | Age: 77
End: 2017-10-16

## 2017-11-06 LAB
FUNGUS IDENTIFIED: NORMAL
FUNGUS SMEAR: NORMAL

## 2017-11-16 LAB
AFB CULTURE & SMEAR: NORMAL
AFB SMEAR: NORMAL

## 2020-11-03 PROBLEM — E03.9 HYPOTHYROIDISM: Status: RESOLVED | Noted: 2020-11-03 | Resolved: 2020-11-03
